# Patient Record
Sex: FEMALE | Race: WHITE | NOT HISPANIC OR LATINO | Employment: FULL TIME | URBAN - METROPOLITAN AREA
[De-identification: names, ages, dates, MRNs, and addresses within clinical notes are randomized per-mention and may not be internally consistent; named-entity substitution may affect disease eponyms.]

---

## 2018-01-13 ENCOUNTER — OFFICE VISIT (OUTPATIENT)
Dept: URGENT CARE | Facility: CLINIC | Age: 53
End: 2018-01-13
Payer: COMMERCIAL

## 2018-01-13 ENCOUNTER — GENERIC CONVERSION - ENCOUNTER (OUTPATIENT)
Dept: OTHER | Facility: OTHER | Age: 53
End: 2018-01-13

## 2018-01-13 DIAGNOSIS — I10 ESSENTIAL (PRIMARY) HYPERTENSION: ICD-10-CM

## 2018-01-13 LAB
ATRIAL RATE: 82 BPM
ATRIAL RATE: 82 BPM
P AXIS: 56 DEGREES
P AXIS: 59 DEGREES
PR INTERVAL: 146 MS
PR INTERVAL: 150 MS
QRS AXIS: 46 DEGREES
QRS AXIS: 50 DEGREES
QRSD INTERVAL: 88 MS
QRSD INTERVAL: 90 MS
QT INTERVAL: 396 MS
QT INTERVAL: 404 MS
QTC INTERVAL: 462 MS
QTC INTERVAL: 472 MS
T WAVE AXIS: 51 DEGREES
T WAVE AXIS: 53 DEGREES
VENTRICULAR RATE: 82 BPM
VENTRICULAR RATE: 82 BPM

## 2018-01-13 PROCEDURE — 99205 OFFICE O/P NEW HI 60 MIN: CPT

## 2018-01-13 PROCEDURE — 93005 ELECTROCARDIOGRAM TRACING: CPT

## 2018-01-14 NOTE — PROGRESS NOTES
Assessment   1  Hypertension, unspecified type (401 9) (I10)  2  Hypothyroidism, unspecified type (244 9) (E03 9)    Plan   Hypertension, unspecified type    · Start: Metoprolol Tartrate 25 MG Oral Tablet; TAKE 1 TABLET DAILY   · (1) COMPREHENSIVE METABOLIC PANEL; Status:Active; Requested [id number];    · (1) COMPREHENSIVE METABOLIC PANEL; Status:Active; Requested [id number]; 1    · (1) LIPID PANEL, FASTING; Status:Active; Requested [id number];    · (1) LIPID PANEL, FASTING; Status:Active; Requested [id number]; 1    · (1) TSH; Status:Active; Requested [id number];    · (1) TSH; Status:Active; Requested [id number]; 1    · EKG/ECG- POC; Status:Active - Perform Order; Requested [id number];      1 Amended By: Florian Velasco; 2018 9:09 AM EST      Discussion/Summary   Discussion Summary:    Recommend rest  Follow-up with your family physician at your earliest convenience  Return here or to an emergency department if you should experience worsening symptoms  Medication Side Effects Reviewed: Possible side effects of new medications were reviewed with the patient/guardian today  Understands and agrees with treatment plan: The treatment plan was reviewed with the patient/guardian  The patient/guardian understands and agrees with the treatment plan    Counseling Documentation With Imm: The patient was counseled regarding instructions for management,-- risk factor reductions,-- impressions  Chief Complaint   1  Dizziness  Chief Complaint Free Text Note Form: High blood pressure, Dizzi spells and palpatations on and off for 1 wk      History of Present Illness   HPI: The patient complains of elevated blood pressure and palpitations  She was in her normal state of good health until recently  She has had some anxiety at work which may be causing some of her problems  She does have a history of high blood pressure  She is taking lisinopril 20 mg daily      Hospital Based Practices Required Assessment:      Abuse And Domestic Violence Screen       Yes, the patient is safe at home  -- The patient states no one is hurting them  Depression And Suicide Screen  No, the patient has not had thoughts of hurting themself  No, the patient has not felt depressed in the past 7 days  Prefered Language is  [place]  Primary Language is  English  Review of Systems   Focused-Female:      Constitutional: No fever, no chills, feels well, no tiredness, no recent weight gain or loss  ENT: no earache,-- no nosebleeds,-- no sore throat,-- no nasal discharge-- and-- no hoarseness  Cardiovascular: palpitations, but-- the heart rate was not slow,-- no chest pain,-- no intermittent leg claudication,-- the heart rate was not fast-- and-- no lower extremity edema  Respiratory: no complaints of shortness of breath, no wheezing, no dyspnea on exertion, no orthopnea or PND  Gastrointestinal: no complaints of abdominal pain, no constipation, no nausea or diarrhea, no vomiting, no bloody stools  Genitourinary: no complaints of dysuria, no incontinence, no pelvic pain, no dysmenorrhea, no vaginal discharge or abnormal vaginal bleeding  Musculoskeletal: no complaints of arthralgia, no myalgia, no joint swelling or stiffness, no limb pain or swelling  Integumentary: no complaints of skin rash or lesion, no itching or dry skin, no skin wounds  Neurological: no complaints of headache, no confusion, no numbness or tingling, no dizziness or fainting  Past Medical History   Active Problems And Past Medical History Reviewed: The active problems and past medical history were reviewed and updated today  Family History   Family History Reviewed: The family history was reviewed and updated today  Social History   Social History Reviewed: The social history was reviewed and updated today  Surgical History   Surgical History Reviewed:     The surgical history was reviewed and updated today  Current Meds   Medication List Reviewed: The medication list was reviewed and updated today  Vitals   Signs   Recorded: [id number] 08:22AM   Temperature: 99 3 F  Heart Rate: 97  Systolic: 106  Diastolic: 86  Height: 5 ft 3 in  Weight: 194 lb 6 oz  BMI Calculated: 34 43  BSA Calculated: 1 91  O2 Saturation: 97  Pain Scale: 0    Physical Exam        Constitutional      General appearance: No acute distress, well appearing and well nourished  Eyes      Conjunctiva and lids: No swelling, erythema or discharge  Pupils and irises: Equal, round and reactive to light  Ears, Nose, Mouth, and Throat      External inspection of ears and nose: Normal        Otoscopic examination: Tympanic membranes translucent with normal light reflex  Canals patent without erythema  Nasal mucosa, septum, and turbinates: Normal without edema or erythema  Oropharynx: Normal with no erythema, edema, exudate or lesions  Pulmonary      Respiratory effort: No increased work of breathing or signs of respiratory distress  Auscultation of lungs: Clear to auscultation  Cardiovascular      Palpation of heart: Normal PMI, no thrills  Auscultation of heart: Normal rate and rhythm, normal S1 and S2, without murmurs  Examination of extremities for edema and/or varicosities: Normal        Abdomen      Abdomen: Non-tender, no masses  Liver and spleen: No hepatomegaly or splenomegaly  Lymphatic      Palpation of lymph nodes in neck: No lymphadenopathy  Musculoskeletal      Gait and station: Normal        Digits and nails: Normal without clubbing or cyanosis  Inspection/palpation of joints, bones, and muscles: Normal        Skin      Skin and subcutaneous tissue: Normal without rashes or lesions  Neurologic      Cranial nerves: Cranial nerves 2-12 intact  Reflexes: 2+ and symmetric  Sensation: No sensory loss  Psychiatric      Orientation to person, place, and time: Normal        Mood and affect: Normal        Results/Data   ECG: A 12 lead ECG was performed and was normal       Rhythm and rate: normal sinus rhythm        Signatures    Electronically signed by : Sarah Santoyo DO; Jan 13 2018  9:01AM EST                       (Author)     Electronically signed by : Sarah Santoyo DO; Jan 13 2018  9:05AM EST                       (Author)     Electronically signed by : Sarah Santoyo DO; Jan 13 2018  9:07AM EST                       (Author)     Electronically signed by : Sarah Santoyo DO; Jan 13 2018  9:10AM EST                       (Author)

## 2018-01-23 VITALS
OXYGEN SATURATION: 97 % | DIASTOLIC BLOOD PRESSURE: 86 MMHG | BODY MASS INDEX: 34.44 KG/M2 | WEIGHT: 194.38 LBS | TEMPERATURE: 99.3 F | HEIGHT: 63 IN | SYSTOLIC BLOOD PRESSURE: 160 MMHG | HEART RATE: 97 BPM

## 2019-07-21 ENCOUNTER — OFFICE VISIT (OUTPATIENT)
Dept: URGENT CARE | Facility: CLINIC | Age: 54
End: 2019-07-21
Payer: COMMERCIAL

## 2019-07-21 VITALS
RESPIRATION RATE: 18 BRPM | OXYGEN SATURATION: 96 % | TEMPERATURE: 98 F | WEIGHT: 205 LBS | HEIGHT: 63 IN | BODY MASS INDEX: 36.32 KG/M2 | HEART RATE: 82 BPM | DIASTOLIC BLOOD PRESSURE: 79 MMHG | SYSTOLIC BLOOD PRESSURE: 146 MMHG

## 2019-07-21 DIAGNOSIS — J06.9 URTI (ACUTE UPPER RESPIRATORY INFECTION): Primary | ICD-10-CM

## 2019-07-21 PROCEDURE — 99213 OFFICE O/P EST LOW 20 MIN: CPT | Performed by: FAMILY MEDICINE

## 2019-07-21 RX ORDER — PANTOPRAZOLE SODIUM 40 MG/1
GRANULE, DELAYED RELEASE ORAL
COMMUNITY
End: 2022-06-17

## 2019-07-21 RX ORDER — AZITHROMYCIN 250 MG/1
TABLET, FILM COATED ORAL
Qty: 6 TABLET | Refills: 0 | Status: SHIPPED | OUTPATIENT
Start: 2019-07-21 | End: 2019-07-26

## 2019-07-21 RX ORDER — NALTREXONE HYDROCHLORIDE AND BUPROPION HYDROCHLORIDE 8; 90 MG/1; MG/1
TABLET, EXTENDED RELEASE ORAL
COMMUNITY
Start: 2019-04-16 | End: 2022-06-17

## 2019-07-21 RX ORDER — ATORVASTATIN CALCIUM 20 MG/1
40 TABLET, FILM COATED ORAL
COMMUNITY
Start: 2019-02-12 | End: 2022-06-17

## 2019-07-21 RX ORDER — LEVOTHYROXINE SODIUM 0.12 MG/1
125 TABLET ORAL
COMMUNITY
Start: 2018-08-20 | End: 2019-08-20

## 2019-07-21 RX ORDER — LISINOPRIL 40 MG/1
40 TABLET ORAL
COMMUNITY
Start: 2019-05-17

## 2019-07-21 NOTE — PROGRESS NOTES
Assessment/Plan:  Recommend supportive care fluids and rest   Follow-up with family physician if not a lot better in 3 days  Diagnoses and all orders for this visit:    URTI (acute upper respiratory infection)  -     azithromycin (ZITHROMAX) 250 mg tablet; Take 2 tablets today then 1 tablet daily x 4 days  -     Dextromethorphan-guaiFENesin (DELSYM COUGH/CHEST CONGEST DM) 5-100 MG/5ML LIQD; Take 5 mL by mouth 2 (two) times a day for 1 day    Other orders  -     atorvastatin (LIPITOR) 20 mg tablet; Take 20 mg by mouth  -     levothyroxine 125 mcg tablet; Take 125 mcg by mouth  -     Discontinue: Dulaglutide (TRULICITY) 9 39 QF/1 8UV SOPN; INJECT AS DIRECTED 0 5 ML ONCE A WEEK SUBCUTANEOUS 30 DAYS  -     pantoprazole (PROTONIX) 40 mg; Take by mouth  -     CONTRAVE 8-90 MG TB12  -     lisinopril (ZESTRIL) 40 mg tablet; Take 40 mg by mouth          Subjective:      Patient ID: Kumar Hale is a 48 y o  female  Patient presents with:  Cough: 3 weeks with symptoms last 3 days have got worse     She has been taking Coricidin, using Flonase and Benadryl with some relief  The following portions of the patient's history were reviewed and updated as appropriate: allergies, current medications, past family history, past medical history, past social history, past surgical history and problem list     Review of Systems   Constitutional: Negative  HENT: Positive for congestion, postnasal drip, rhinorrhea, sinus pressure and sinus pain  Negative for sore throat  Eyes: Negative  Respiratory: Positive for cough  Cardiovascular: Negative  Gastrointestinal: Negative  Endocrine: Negative  Genitourinary: Negative  Musculoskeletal: Negative  Skin: Negative  Allergic/Immunologic: Negative  Neurological: Negative  Hematological: Negative  Psychiatric/Behavioral: Negative  All other systems reviewed and are negative          Objective:      /79   Pulse 82   Temp 98 °F (36 7 °C) Resp 18   Ht 5' 3" (1 6 m)   Wt 93 kg (205 lb)   SpO2 96%   BMI 36 31 kg/m²          Physical Exam   Constitutional: She is oriented to person, place, and time  She appears well-developed and well-nourished  HENT:   Head: Normocephalic and atraumatic  Right Ear: External ear normal    Left Ear: External ear normal    Nose: Nose normal    Mouth/Throat: Oropharyngeal exudate present  Eyes: Pupils are equal, round, and reactive to light  Conjunctivae and EOM are normal    Neck: Normal range of motion  Neck supple  Cardiovascular: Normal rate, regular rhythm and normal heart sounds  Pulmonary/Chest: Effort normal and breath sounds normal    Abdominal: Soft  Bowel sounds are normal    Musculoskeletal: Normal range of motion  Neurological: She is alert and oriented to person, place, and time  She has normal reflexes  Skin: Skin is warm and dry  Psychiatric: She has a normal mood and affect  Her behavior is normal    Nursing note and vitals reviewed

## 2020-06-24 LAB — HCV AB SER-ACNC: NORMAL

## 2020-08-12 ENCOUNTER — APPOINTMENT (OUTPATIENT)
Dept: RADIOLOGY | Facility: CLINIC | Age: 55
End: 2020-08-12
Payer: COMMERCIAL

## 2020-08-12 ENCOUNTER — OFFICE VISIT (OUTPATIENT)
Dept: URGENT CARE | Facility: CLINIC | Age: 55
End: 2020-08-12
Payer: COMMERCIAL

## 2020-08-12 VITALS
RESPIRATION RATE: 16 BRPM | SYSTOLIC BLOOD PRESSURE: 186 MMHG | HEART RATE: 95 BPM | HEIGHT: 63 IN | WEIGHT: 213 LBS | TEMPERATURE: 99.8 F | BODY MASS INDEX: 37.74 KG/M2 | DIASTOLIC BLOOD PRESSURE: 84 MMHG | OXYGEN SATURATION: 96 %

## 2020-08-12 DIAGNOSIS — M25.572 ACUTE LEFT ANKLE PAIN: ICD-10-CM

## 2020-08-12 DIAGNOSIS — S86.012A RUPTURE OF LEFT ACHILLES TENDON, INITIAL ENCOUNTER: Primary | ICD-10-CM

## 2020-08-12 PROCEDURE — 73650 X-RAY EXAM OF HEEL: CPT

## 2020-08-12 PROCEDURE — 99213 OFFICE O/P EST LOW 20 MIN: CPT | Performed by: EMERGENCY MEDICINE

## 2020-08-12 RX ORDER — LEVOTHYROXINE SODIUM 175 UG/1
135 TABLET ORAL DAILY
COMMUNITY
End: 2022-06-17

## 2020-08-12 RX ORDER — SERTRALINE HYDROCHLORIDE 100 MG/1
100 TABLET, FILM COATED ORAL DAILY
COMMUNITY

## 2020-08-12 NOTE — PROGRESS NOTES
[facility]        NAME: Viktoriya Carter is a 47 y o  female  : 1965    MRN: [id number]  DATE: 2020  TIME: 9:33 AM    Assessment and Plan   Rupture of left Achilles tendon, initial encounter [S86 012A]  1  Rupture of left Achilles tendon, initial encounter  XR heel / calcaneus 2+ vw left    Ambulatory referral to Orthopedic Surgery    Crutches    M-Brace Air-Gel Ankle Brace    CANCELED: XR ankle 3+ vw left         Patient Instructions     Patient Instructions     Achilles Tendon Rupture   WHAT YOU NEED TO KNOW:   An Achilles tendon rupture happens when your Achilles tendon tears, or separates from your heel bone  The Achilles tendon connects your calf muscle to your heel bone  It allows you to point your foot down and to rise on your toes  An Achilles tendon rupture may be caused by a sports injury or a fall  DISCHARGE INSTRUCTIONS:   Return to the emergency department if:   · Your leg feels warm, tender, and painful  It may look swollen and red  · Your foot or toes are numb  Contact your healthcare provider if:   · You have a fever  · Your symptoms do not get better with treatment  · You have questions or concerns about your condition or care  Medicines: You may  need any of the following:  · NSAIDs , such as ibuprofen, help decrease swelling, pain, and fever  This medicine is available with or without a doctor's order  NSAIDs can cause stomach bleeding or kidney problems in certain people  If you take blood thinner medicine, always ask your healthcare provider if NSAIDs are safe for you  Always read the medicine label and follow directions  · Prescription pain medicine  may be given  Ask your healthcare provider how to take this medicine safely  Some prescription pain medicines contain acetaminophen  Do not take other medicines that contain acetaminophen without talking to your healthcare provider  Too much acetaminophen may cause liver damage   Prescription pain medicine may cause constipation  Ask your healthcare provider how to prevent or treat constipation  · Take your medicine as directed  Contact your healthcare provider if you think your medicine is not helping or if you have side effects  Tell him or her if you are allergic to any medicine  Keep a list of the medicines, vitamins, and herbs you take  Include the amounts, and when and why you take them  Bring the list or the pill bottles to follow-up visits  Carry your medicine list with you in case of an emergency  Use a support device as directed: You may need crutches or a cane to decrease stress and pressure on your tendon  Your healthcare provider will tell you how much weight you can put on your leg  Ask for more information about how to use crutches or a cane correctly  Wear your brace or splint as directed  This devices will keep your tendon straight and help it heal    Rest  as directed  Your healthcare provider will tell you when it is okay to walk and play sports  You may not be able to play sports for 6 months or longer  Ask when you can go back to work or school  Do not drive until your healthcare provider says it is okay  Apply ice  on your Achilles tendon for 15 to 20 minutes every hour or as directed  Use an ice pack, or put crushed ice in a plastic bag  Cover it with a towel  Ice helps prevent tissue damage and decreases swelling and pain  Elevate  your heel above the level of your heart as often as you can  This will help decrease swelling and pain  Prop your heel on pillows or blankets to keep it elevated comfortably  Go to physical therapy as directed:  A physical therapist teaches you exercises to help improve movement and strength, and to decrease pain  You may not start physical therapy for a few weeks or until your cast is removed  Follow up with your healthcare provider as directed: You will need to return to have your cast adjusted   Write down your questions so you remember to ask them during your visits  © 2017 [employer] Information is for End User's use only and may not be sold, redistributed or otherwise used for commercial purposes  All illustrations and images included in CareNotes® are the copyrighted property of [employer]  or [employer]  The above information is an  only  It is not intended as medical advice for individual conditions or treatments  Talk to your doctor, nurse or pharmacist before following any medical regimen to see if it is safe and effective for you  Ankle Sprain   WHAT YOU NEED TO KNOW:   An ankle sprain happens when 1 or more ligaments in your ankle joint stretch or tear  Ligaments are tough tissues that connect bones  Ligaments support your joints and keep your bones in place  DISCHARGE INSTRUCTIONS:   Return to the emergency department if:   · You have severe pain in your ankle  · Your foot or toes are cold or numb  · Your ankle becomes more weak or unstable (wobbly)  · You are unable to put any weight on your ankle or foot  · Your swelling has increased or returned  Contact your healthcare provider if:   · Your pain does not go away, even after treatment  · You have questions or concerns about your condition or care  Medicines: You may need any of the following:  · NSAIDs , such as ibuprofen, help decrease swelling, pain, and fever  This medicine is available with or without a doctor's order  NSAIDs can cause stomach bleeding or kidney problems in certain people  If you take blood thinner medicine, always ask your healthcare provider if NSAIDs are safe for you  Always read the medicine label and follow directions  · Acetaminophen  decreases pain  It is available without a doctor's order  Ask how much to take and how often to take it  Follow directions  Acetaminophen can cause liver damage if not taken correctly  · Prescription pain medicine  may be given   Ask how to take this medicine safely  · Take your medicine as directed  Contact your healthcare provider if you think your medicine is not helping or if you have side effects  Tell him or her if you are allergic to any medicine  Keep a list of the medicines, vitamins, and herbs you take  Include the amounts, and when and why you take them  Bring the list or the pill bottles to follow-up visits  Carry your medicine list with you in case of an emergency  Self care:   · Use support devices,  such as a brace, cast, or splint, may be needed to limit your movement and protect your joint  You may need to use crutches to decrease your pain as you move around  · Go to physical therapy as directed  A physical therapist teaches you exercises to help improve movement and strength, and to decrease pain  · Rest  your ankle so that it can heal  Return to normal activities as directed  · Apply ice on your ankle for 15 to 20 minutes every hour or as directed  Use an ice pack, or put crushed ice in a plastic bag  Cover it with a towel  Ice helps prevent tissue damage and decreases swelling and pain  · Compress  your ankle  Ask if you should wrap an elastic bandage around your injured ligament  An elastic bandage provides support and helps decrease swelling and movement so your joint can heal  Wear as long as directed  · Elevate  your ankle above the level of your heart as often as you can  This will help decrease swelling and pain  Prop your ankle on pillows or blankets to keep it elevated comfortably  Prevent another ankle sprain:   · Let your ankle heal   Find out how long your ligament needs to heal  Do not do any physical activity until your healthcare provider says it is okay  If you start activity too soon, you may develop a more serious injury  · Always warm up and stretch  before you exercise or play sports  · Use the right equipment  Always wear shoes that fit well and are made for the activity that you are doing   You may also need ankle supports, elbow and knee pads, or braces  Follow up with your healthcare provider as directed:  Write down your questions so you remember to ask them during your visits  © 2017 [employer] Information is for End User's use only and may not be sold, redistributed or otherwise used for commercial purposes  All illustrations and images included in CareNotes® are the copyrighted property of [employer]  or [employer]  The above information is an  only  It is not intended as medical advice for individual conditions or treatments  Talk to your doctor, nurse or pharmacist before following any medical regimen to see if it is safe and effective for you  Follow up with PCP in 3-5 days  Proceed to  ER if symptoms worsen  Chief Complaint     Chief Complaint   Patient presents with    Ankle Injury     yesterday felt a pop in left heel area while running on tip toes, pain to left heel area, tx with ice but felt worse after that  History of Present Illness       Patient complains of sudden onset of pain associated with audible pop at Achilles tendon while running on her tip toes yesterday  She denies prior Achilles or ankle problems  Review of Systems   Review of Systems   Constitutional: Negative for activity change  Gastrointestinal: Negative for abdominal pain  Musculoskeletal: Positive for arthralgias, gait problem and joint swelling  Negative for back pain, myalgias, neck pain and neck stiffness  Skin: Negative for color change and wound  Neurological: Negative for dizziness, syncope, weakness, light-headedness and headaches           Current Medications       Current Outpatient Medications:     atorvastatin (LIPITOR) 20 mg tablet, Take 20 mg by mouth, Disp: , Rfl:     CONTRAVE 8-90 MG TB12, , Disp: , Rfl:     levothyroxine 175 mcg tablet, Take 175 mcg by mouth daily, Disp: , Rfl:     lisinopril (ZESTRIL) 40 mg tablet, Take 40 mg by mouth, Disp: , Rfl:     pantoprazole (PROTONIX) 40 mg, Take by mouth, Disp: , Rfl:     sertraline (ZOLOFT) 100 mg tablet, Take 100 mg by mouth daily, Disp: , Rfl:     levothyroxine 125 mcg tablet, Take 125 mcg by mouth, Disp: , Rfl:     Current Allergies     Allergies as of 08/12/2020 - Reviewed 08/12/2020   Allergen Reaction Noted    Penicillins Anaphylaxis 12/13/2012    Clarithromycin Hives and Other (See Comments) 01/10/2013    Sulfamethoxazole-trimethoprim Diarrhea 12/13/2012    Tetracyclines & related Swelling 12/13/2012            The following portions of the patient's history were reviewed and updated as appropriate: allergies, current medications, past family history, past medical history, past social history, past surgical history and problem list      Past Medical History:   Diagnosis Date    Anxiety     High cholesterol     Hypertension     Hypothyroid        Past Surgical History:   Procedure Laterality Date    BACK SURGERY      CARPAL TUNNEL RELEASE Right     ENDOMETRIAL ABLATION      HYSTERECTOMY      HYSTERECTOMY      LAMINECTOMY         History reviewed  No pertinent family history  Medications have been verified  Objective   BP (!) 186/84   Pulse 95   Temp 99 8 °F (37 7 °C) (Tympanic)   Resp 16   Ht 5' 3" (1 6 m)   Wt 96 6 kg (213 lb)   SpO2 96%   BMI 37 73 kg/m²        Physical Exam     Physical Exam  Vitals signs and nursing note reviewed  Constitutional:       Appearance: She is well-developed  HENT:      Head: Normocephalic and atraumatic  Eyes:      Conjunctiva/sclera: Conjunctivae normal       Pupils: Pupils are equal, round, and reactive to light  Neck:      Musculoskeletal: Normal range of motion and neck supple  Musculoskeletal:         General: Tenderness present  Comments: Tender at Achilles insertion on calcaneus lateral aspect  Questionable Nolen test   Skin:     General: Skin is warm and dry  Findings: No rash  Neurological:      Mental Status: She is alert and oriented to person, place, and time  Deep Tendon Reflexes: Reflexes normal    Psychiatric:         Behavior: Behavior normal          Thought Content:  Thought content normal          Judgment: Judgment normal

## 2020-08-12 NOTE — PATIENT INSTRUCTIONS
Achilles Tendon Rupture   WHAT YOU NEED TO KNOW:   An Achilles tendon rupture happens when your Achilles tendon tears, or separates from your heel bone  The Achilles tendon connects your calf muscle to your heel bone  It allows you to point your foot down and to rise on your toes  An Achilles tendon rupture may be caused by a sports injury or a fall  DISCHARGE INSTRUCTIONS:   Return to the emergency department if:   · Your leg feels warm, tender, and painful  It may look swollen and red  · Your foot or toes are numb  Contact your healthcare provider if:   · You have a fever  · Your symptoms do not get better with treatment  · You have questions or concerns about your condition or care  Medicines: You may  need any of the following:  · NSAIDs , such as ibuprofen, help decrease swelling, pain, and fever  This medicine is available with or without a doctor's order  NSAIDs can cause stomach bleeding or kidney problems in certain people  If you take blood thinner medicine, always ask your healthcare provider if NSAIDs are safe for you  Always read the medicine label and follow directions  · Prescription pain medicine  may be given  Ask your healthcare provider how to take this medicine safely  Some prescription pain medicines contain acetaminophen  Do not take other medicines that contain acetaminophen without talking to your healthcare provider  Too much acetaminophen may cause liver damage  Prescription pain medicine may cause constipation  Ask your healthcare provider how to prevent or treat constipation  · Take your medicine as directed  Contact your healthcare provider if you think your medicine is not helping or if you have side effects  Tell him or her if you are allergic to any medicine  Keep a list of the medicines, vitamins, and herbs you take  Include the amounts, and when and why you take them  Bring the list or the pill bottles to follow-up visits   Carry your medicine list with you in case of an emergency  Use a support device as directed: You may need crutches or a cane to decrease stress and pressure on your tendon  Your healthcare provider will tell you how much weight you can put on your leg  Ask for more information about how to use crutches or a cane correctly  Wear your brace or splint as directed  This devices will keep your tendon straight and help it heal    Rest  as directed  Your healthcare provider will tell you when it is okay to walk and play sports  You may not be able to play sports for 6 months or longer  Ask when you can go back to work or school  Do not drive until your healthcare provider says it is okay  Apply ice  on your Achilles tendon for 15 to 20 minutes every hour or as directed  Use an ice pack, or put crushed ice in a plastic bag  Cover it with a towel  Ice helps prevent tissue damage and decreases swelling and pain  Elevate  your heel above the level of your heart as often as you can  This will help decrease swelling and pain  Prop your heel on pillows or blankets to keep it elevated comfortably  Go to physical therapy as directed:  A physical therapist teaches you exercises to help improve movement and strength, and to decrease pain  You may not start physical therapy for a few weeks or until your cast is removed  Follow up with your healthcare provider as directed: You will need to return to have your cast adjusted  Write down your questions so you remember to ask them during your visits  © 2017 [address] [employer] Information is for End User's use only and may not be sold, redistributed or otherwise used for commercial purposes  All illustrations and images included in CareNotes® are the copyrighted property of A D A M , Inc  or [employer]  The above information is an  only  It is not intended as medical advice for individual conditions or treatments   Talk to your doctor, nurse or pharmacist before following any medical regimen to see if it is safe and effective for you  Ankle Sprain   WHAT YOU NEED TO KNOW:   An ankle sprain happens when 1 or more ligaments in your ankle joint stretch or tear  Ligaments are tough tissues that connect bones  Ligaments support your joints and keep your bones in place  DISCHARGE INSTRUCTIONS:   Return to the emergency department if:   · You have severe pain in your ankle  · Your foot or toes are cold or numb  · Your ankle becomes more weak or unstable (wobbly)  · You are unable to put any weight on your ankle or foot  · Your swelling has increased or returned  Contact your healthcare provider if:   · Your pain does not go away, even after treatment  · You have questions or concerns about your condition or care  Medicines: You may need any of the following:  · NSAIDs , such as ibuprofen, help decrease swelling, pain, and fever  This medicine is available with or without a doctor's order  NSAIDs can cause stomach bleeding or kidney problems in certain people  If you take blood thinner medicine, always ask your healthcare provider if NSAIDs are safe for you  Always read the medicine label and follow directions  · Acetaminophen  decreases pain  It is available without a doctor's order  Ask how much to take and how often to take it  Follow directions  Acetaminophen can cause liver damage if not taken correctly  · Prescription pain medicine  may be given  Ask how to take this medicine safely  · Take your medicine as directed  Contact your healthcare provider if you think your medicine is not helping or if you have side effects  Tell him or her if you are allergic to any medicine  Keep a list of the medicines, vitamins, and herbs you take  Include the amounts, and when and why you take them  Bring the list or the pill bottles to follow-up visits  Carry your medicine list with you in case of an emergency    Self care:   · Use support devices,  such as a brace, cast, or splint, may be needed to limit your movement and protect your joint  You may need to use crutches to decrease your pain as you move around  · Go to physical therapy as directed  A physical therapist teaches you exercises to help improve movement and strength, and to decrease pain  · Rest  your ankle so that it can heal  Return to normal activities as directed  · Apply ice on your ankle for 15 to 20 minutes every hour or as directed  Use an ice pack, or put crushed ice in a plastic bag  Cover it with a towel  Ice helps prevent tissue damage and decreases swelling and pain  · Compress  your ankle  Ask if you should wrap an elastic bandage around your injured ligament  An elastic bandage provides support and helps decrease swelling and movement so your joint can heal  Wear as long as directed  · Elevate  your ankle above the level of your heart as often as you can  This will help decrease swelling and pain  Prop your ankle on pillows or blankets to keep it elevated comfortably  Prevent another ankle sprain:   · Let your ankle heal   Find out how long your ligament needs to heal  Do not do any physical activity until your healthcare provider says it is okay  If you start activity too soon, you may develop a more serious injury  · Always warm up and stretch  before you exercise or play sports  · Use the right equipment  Always wear shoes that fit well and are made for the activity that you are doing  You may also need ankle supports, elbow and knee pads, or braces  Follow up with your healthcare provider as directed:  Write down your questions so you remember to ask them during your visits  © 2017 [facility] Information is for End User's use only and may not be sold, redistributed or otherwise used for commercial purposes  All illustrations and images included in CareNotes® are the copyrighted property of A D A M , Inc  or [employer]ft[employer] Mary    The above information is an  only  It is not intended as medical advice for individual conditions or treatments  Talk to your doctor, nurse or pharmacist before following any medical regimen to see if it is safe and effective for you

## 2020-12-21 ENCOUNTER — OFFICE VISIT (OUTPATIENT)
Dept: URGENT CARE | Facility: CLINIC | Age: 55
End: 2020-12-21
Payer: COMMERCIAL

## 2020-12-21 VITALS
BODY MASS INDEX: 37.74 KG/M2 | RESPIRATION RATE: 16 BRPM | HEART RATE: 93 BPM | WEIGHT: 213 LBS | TEMPERATURE: 98.2 F | HEIGHT: 63 IN | OXYGEN SATURATION: 96 %

## 2020-12-21 DIAGNOSIS — Z20.822 ENCOUNTER FOR LABORATORY TESTING FOR COVID-19 VIRUS: ICD-10-CM

## 2020-12-21 DIAGNOSIS — R68.89 FLU-LIKE SYMPTOMS: Primary | ICD-10-CM

## 2020-12-21 PROCEDURE — G0382 LEV 3 HOSP TYPE B ED VISIT: HCPCS | Performed by: EMERGENCY MEDICINE

## 2020-12-21 PROCEDURE — S9083 URGENT CARE CENTER GLOBAL: HCPCS | Performed by: EMERGENCY MEDICINE

## 2020-12-21 PROCEDURE — U0003 INFECTIOUS AGENT DETECTION BY NUCLEIC ACID (DNA OR RNA); SEVERE ACUTE RESPIRATORY SYNDROME CORONAVIRUS 2 (SARS-COV-2) (CORONAVIRUS DISEASE [COVID-19]), AMPLIFIED PROBE TECHNIQUE, MAKING USE OF HIGH THROUGHPUT TECHNOLOGIES AS DESCRIBED BY CMS-2020-01-R: HCPCS | Performed by: EMERGENCY MEDICINE

## 2020-12-22 LAB — SARS-COV-2 RNA SPEC QL NAA+PROBE: NOT DETECTED

## 2021-03-24 ENCOUNTER — OFFICE VISIT (OUTPATIENT)
Dept: URGENT CARE | Facility: CLINIC | Age: 56
End: 2021-03-24
Payer: COMMERCIAL

## 2021-03-24 VITALS
HEIGHT: 63 IN | OXYGEN SATURATION: 97 % | WEIGHT: 215 LBS | TEMPERATURE: 97.5 F | HEART RATE: 89 BPM | DIASTOLIC BLOOD PRESSURE: 92 MMHG | RESPIRATION RATE: 18 BRPM | SYSTOLIC BLOOD PRESSURE: 148 MMHG | BODY MASS INDEX: 38.09 KG/M2

## 2021-03-24 DIAGNOSIS — S39.012A STRAIN OF LUMBAR REGION, INITIAL ENCOUNTER: Primary | ICD-10-CM

## 2021-03-24 PROCEDURE — 99213 OFFICE O/P EST LOW 20 MIN: CPT | Performed by: FAMILY MEDICINE

## 2021-03-24 RX ORDER — BACLOFEN 10 MG/1
10 TABLET ORAL 3 TIMES DAILY
Qty: 30 TABLET | Refills: 0 | Status: SHIPPED | OUTPATIENT
Start: 2021-03-24 | End: 2022-06-17

## 2021-03-24 NOTE — PROGRESS NOTES
[facility]        NAME: Mauro Muñiz is a 54 y o  female  : 1965    MRN: [id number]  DATE: 2021  TIME: 8:22 AM    Assessment and Plan   Strain of lumbar region, initial encounter [S39 012A]  1  Strain of lumbar region, initial encounter  baclofen 10 mg tablet         Patient Instructions     Continue heat for 20 30 minutes, 3 to 4 times a day  Continue ibuprofen, 600 mg, every 6 hours  Follow up with PCP in 3-5 days  Proceed to  ER if symptoms worsen  Chief Complaint     Chief Complaint   Patient presents with    Back Pain     Right Lower back pain s/p walking 3/19         History of Present Illness       Back Pain (Right Lower back pain s/p walking 3/19)  Denies any trauma  Patient has been using ice with no relief, heat with relief, and ibuprofen with relief, but the pain has continued  Back Pain  This is a new problem  The current episode started in the past 7 days  The problem occurs constantly  The problem is unchanged  The pain is present in the sacro-iliac  The pain is moderate  Review of Systems   Review of Systems   Constitutional: Negative  Respiratory: Negative  Cardiovascular: Negative  Musculoskeletal: Positive for back pain           Current Medications       Current Outpatient Medications:     atorvastatin (LIPITOR) 20 mg tablet, Take 40 mg by mouth , Disp: , Rfl:     levothyroxine 175 mcg tablet, Take 135 mcg by mouth daily , Disp: , Rfl:     lisinopril (ZESTRIL) 40 mg tablet, Take 40 mg by mouth, Disp: , Rfl:     pantoprazole (PROTONIX) 40 mg, Take by mouth, Disp: , Rfl:     sertraline (ZOLOFT) 100 mg tablet, Take 100 mg by mouth daily, Disp: , Rfl:     baclofen 10 mg tablet, Take 1 tablet (10 mg total) by mouth 3 (three) times a day for 10 days, Disp: 30 tablet, Rfl: 0    CONTRAVE 8-90 MG TB12, , Disp: , Rfl:     Current Allergies     Allergies as of 2021 - Reviewed 2021   Allergen Reaction Noted    Penicillins Anaphylaxis 12/13/2012    Clarithromycin Hives and Other (See Comments) 01/10/2013    Sulfamethoxazole-trimethoprim Diarrhea 12/13/2012    Tetracyclines & related Swelling 12/13/2012            The following portions of the patient's history were reviewed and updated as appropriate: allergies, current medications, past family history, past medical history, past social history, past surgical history and problem list      Past Medical History:   Diagnosis Date    Anxiety     High cholesterol     Hypertension     Hypothyroid        Past Surgical History:   Procedure Laterality Date    BACK SURGERY      CARPAL TUNNEL RELEASE Right     ENDOMETRIAL ABLATION      HYSTERECTOMY      HYSTERECTOMY      LAMINECTOMY         History reviewed  No pertinent family history  Medications have been verified  Objective   /99   Pulse 89   Temp 97 5 °F (36 4 °C)   Resp 18   Ht 5' 3" (1 6 m)   Wt 97 5 kg (215 lb)   SpO2 97%   BMI 38 09 kg/m²   No LMP recorded  Patient has had a hysterectomy  Physical Exam     Physical Exam  Constitutional:       Appearance: Normal appearance  She is well-developed  Cardiovascular:      Rate and Rhythm: Normal rate and regular rhythm  Pulmonary:      Effort: Pulmonary effort is normal       Breath sounds: Normal breath sounds  Musculoskeletal:      Lumbar back: She exhibits tenderness, bony tenderness and spasm  She exhibits normal range of motion  Back:    Neurological:      Mental Status: She is alert

## 2021-03-24 NOTE — PATIENT INSTRUCTIONS
Continue heat for 20 30 minutes, 3 to 4 times a day  Continue ibuprofen, 600 mg, every 6 hours  Follow up with PCP in 3-5 days  Proceed to  ER if symptoms worsen  Acute Low Back Pain   AMBULATORY CARE:   Acute low back pain  is sudden discomfort in your lower back area that lasts for up to 6 weeks  The discomfort makes it difficult to tolerate activity  Common symptoms include the following:   · Back stiffness or spasms    · Pain down the back or side of one leg    · Holding yourself in an unusual position or posture to decrease your back pain    · Not being able to find a sitting position that is comfortable    · Slow increase in your pain for 24 to 48 hours after you stress your back    · Tenderness on your lower back or severe pain when you move your back    Seek care immediately if:   · You have severe pain  · You have sudden stiffness and heaviness on both buttocks down to both legs  · You have numbness or weakness in one leg, or pain in both legs  · You have numbness in your genital area or across your lower back  · You cannot control your urine or bowel movements  Contact your healthcare provider if:   · You have a fever  · You have pain at night or when you rest     · Your pain does not get better with treatment  · You have pain that worsens when you cough or sneeze  · You suddenly feel something pop or snap in your back  · You have questions or concerns about your condition or care  The goal of treatment for acute low back pain  is to relieve your pain and help you tolerate activity  Most people with acute lower back pain get better within 4 to 6 weeks  You may need any of the following:  · NSAIDs  help decrease swelling and pain  This medicine is available with or without a doctor's order  NSAIDs can cause stomach bleeding or kidney problems in certain people  If you take blood thinner medicine, always ask your healthcare provider if NSAIDs are safe for you   Always read the medicine label and follow directions  · Acetaminophen  decreases pain and fever  It is available without a doctor's order  Ask how much to take and how often to take it  Follow directions  Read the labels of all other medicines you are using to see if they also contain acetaminophen, or ask your doctor or pharmacist  Acetaminophen can cause liver damage if not taken correctly  Do not use more than 4 grams (4,000 milligrams) total of acetaminophen in one day  · Muscle relaxers  decrease pain by relaxing the muscles in your lower spine  · Prescription pain medicine  may be given  Ask your healthcare provider how to take this medicine safely  Some prescription pain medicines contain acetaminophen  Do not take other medicines that contain acetaminophen without talking to your healthcare provider  Too much acetaminophen may cause liver damage  Prescription pain medicine may cause constipation  Ask your healthcare provider how to prevent or treat constipation  Manage your symptoms:   · Stay active  as much as you can without causing more pain  Bed rest could make your back pain worse  Start with some light exercises such as walking  Avoid heavy lifting until your pain is gone  Ask for more information about the activities or exercises that are right for you  · Apply ice  on your back for 15 to 20 minutes every hour or as directed  Use an ice pack, or put crushed ice in a plastic bag  Cover it with a towel before you apply it to your skin  Ice helps prevent tissue damage and decreases swelling and pain  · Apply heat  on your back for 20 to 30 minutes every 2 hours for as many days as directed  Heat helps decrease pain and muscle spasms  Alternate heat and ice  Prevent acute low back pain:   · Use proper body mechanics  ? Bend at the hips and knees when you  objects  Do not bend from the waist  Use your leg muscles as you lift the load  Do not use your back   Keep the object close to your chest as you lift it  Try not to twist or lift anything above your waist     ? Change your position often when you stand for long periods of time  Rest one foot on a small box or footrest, and then switch to the other foot often  ? Try not to sit for long periods of time  When you do, sit in a straight-backed chair with your feet flat on the floor  Never reach, pull, or push while you are sitting  · Do exercises that strengthen your back muscles  Warm up before you exercise  Ask your healthcare provider the best exercises for you  · Maintain a healthy weight  Ask your healthcare provider how much you should weigh  Ask him to help you create a weight loss plan if you are overweight  Follow up with your healthcare provider as directed:  Return for a follow-up visit if you still have pain after 1 to 3 weeks of treatment  You may need to visit an orthopedist if your back pain lasts longer than 12 weeks  Write down your questions so you remember to ask them during your visits  © Copyright [address] Information is for End User's use only and may not be sold, redistributed or otherwise used for commercial purposes  All illustrations and images included in CareNotes® are the copyrighted property of [employer]  or [facility] [place] [employer]   The above information is an  only  It is not intended as medical advice for individual conditions or treatments  Talk to your doctor, nurse or pharmacist before following any medical regimen to see if it is safe and effective for you

## 2021-10-03 ENCOUNTER — OFFICE VISIT (OUTPATIENT)
Dept: URGENT CARE | Facility: CLINIC | Age: 56
End: 2021-10-03
Payer: COMMERCIAL

## 2021-10-03 VITALS
TEMPERATURE: 97.9 F | SYSTOLIC BLOOD PRESSURE: 142 MMHG | HEART RATE: 97 BPM | RESPIRATION RATE: 18 BRPM | DIASTOLIC BLOOD PRESSURE: 87 MMHG | BODY MASS INDEX: 37.21 KG/M2 | WEIGHT: 210 LBS | OXYGEN SATURATION: 98 % | HEIGHT: 63 IN

## 2021-10-03 DIAGNOSIS — S61.012A LACERATION WITHOUT FOREIGN BODY OF LEFT THUMB WITHOUT DAMAGE TO NAIL, INITIAL ENCOUNTER: Primary | ICD-10-CM

## 2021-10-03 PROCEDURE — 90471 IMMUNIZATION ADMIN: CPT | Performed by: EMERGENCY MEDICINE

## 2021-10-03 PROCEDURE — G0382 LEV 3 HOSP TYPE B ED VISIT: HCPCS | Performed by: EMERGENCY MEDICINE

## 2021-10-03 PROCEDURE — 90715 TDAP VACCINE 7 YRS/> IM: CPT

## 2021-10-03 PROCEDURE — 12001 RPR S/N/AX/GEN/TRNK 2.5CM/<: CPT | Performed by: EMERGENCY MEDICINE

## 2021-10-03 PROCEDURE — S9083 URGENT CARE CENTER GLOBAL: HCPCS | Performed by: EMERGENCY MEDICINE

## 2021-10-03 RX ORDER — DOXYCYCLINE 100 MG/1
100 TABLET ORAL 2 TIMES DAILY
Qty: 10 TABLET | Refills: 0 | Status: SHIPPED | OUTPATIENT
Start: 2021-10-03 | End: 2021-10-08

## 2021-10-24 ENCOUNTER — OFFICE VISIT (OUTPATIENT)
Dept: URGENT CARE | Facility: CLINIC | Age: 56
End: 2021-10-24
Payer: COMMERCIAL

## 2021-10-24 VITALS
OXYGEN SATURATION: 94 % | WEIGHT: 216 LBS | TEMPERATURE: 98.9 F | BODY MASS INDEX: 38.27 KG/M2 | RESPIRATION RATE: 18 BRPM | HEART RATE: 101 BPM | HEIGHT: 63 IN

## 2021-10-24 DIAGNOSIS — Z20.822 EXPOSURE TO COVID-19 VIRUS: ICD-10-CM

## 2021-10-24 DIAGNOSIS — J06.9 VIRAL URI: Primary | ICD-10-CM

## 2021-10-24 PROCEDURE — U0005 INFEC AGEN DETEC AMPLI PROBE: HCPCS | Performed by: PHYSICIAN ASSISTANT

## 2021-10-24 PROCEDURE — U0003 INFECTIOUS AGENT DETECTION BY NUCLEIC ACID (DNA OR RNA); SEVERE ACUTE RESPIRATORY SYNDROME CORONAVIRUS 2 (SARS-COV-2) (CORONAVIRUS DISEASE [COVID-19]), AMPLIFIED PROBE TECHNIQUE, MAKING USE OF HIGH THROUGHPUT TECHNOLOGIES AS DESCRIBED BY CMS-2020-01-R: HCPCS | Performed by: PHYSICIAN ASSISTANT

## 2021-10-24 PROCEDURE — 99213 OFFICE O/P EST LOW 20 MIN: CPT | Performed by: PHYSICIAN ASSISTANT

## 2021-10-25 LAB — SARS-COV-2 RNA RESP QL NAA+PROBE: NEGATIVE

## 2022-06-04 LAB — EXTERNAL EGFR: 88

## 2022-06-10 ENCOUNTER — RA CDI HCC (OUTPATIENT)
Dept: OTHER | Facility: HOSPITAL | Age: 57
End: 2022-06-10

## 2022-06-10 NOTE — PROGRESS NOTES
[facility]  coding opportunities       Chart reviewed, no opportunity found: CHART REVIEWED, NO OPPORTUNITY FOUND        Patients Insurance        Commercial Insurance: [address] [place] [address]

## 2022-06-15 ENCOUNTER — TELEPHONE (OUTPATIENT)
Dept: ADMINISTRATIVE | Facility: OTHER | Age: 57
End: 2022-06-15

## 2022-06-15 NOTE — TELEPHONE ENCOUNTER
Upon review of the In Basket request we were able to locate, review, and update the patient chart as requested for Mammogram     Any additional questions or concerns should be emailed to the Practice Liaisons via [email or web address] email, please do not reply via In Basket      Thank you  Ele Turner

## 2022-06-15 NOTE — TELEPHONE ENCOUNTER
----- Message from Juan Jose Swartz sent at 6/14/2022  3:33 PM EDT -----  Regarding: care gap request  06/14/22 3:33 PM    Hello, our patient attached above has had Mammogram completed/performed  Please assist in updating the patient chart by pulling the Care Everywhere (CE) document  The date of service is 7/2/21       Thank you,  Juan Jose Swartz  [facility] PRIMARY [facility]

## 2022-06-17 ENCOUNTER — OFFICE VISIT (OUTPATIENT)
Dept: FAMILY MEDICINE CLINIC | Facility: CLINIC | Age: 57
End: 2022-06-17
Payer: COMMERCIAL

## 2022-06-17 VITALS
WEIGHT: 219 LBS | HEART RATE: 95 BPM | OXYGEN SATURATION: 98 % | BODY MASS INDEX: 38.8 KG/M2 | SYSTOLIC BLOOD PRESSURE: 120 MMHG | TEMPERATURE: 96.4 F | DIASTOLIC BLOOD PRESSURE: 84 MMHG | HEIGHT: 63 IN

## 2022-06-17 DIAGNOSIS — F41.9 ANXIETY: ICD-10-CM

## 2022-06-17 DIAGNOSIS — M67.422 GANGLION OF LEFT ELBOW: ICD-10-CM

## 2022-06-17 DIAGNOSIS — E89.0 POSTOPERATIVE HYPOTHYROIDISM: Primary | ICD-10-CM

## 2022-06-17 DIAGNOSIS — Z12.31 ENCOUNTER FOR SCREENING MAMMOGRAM FOR BREAST CANCER: ICD-10-CM

## 2022-06-17 DIAGNOSIS — Z11.4 SCREENING FOR HIV (HUMAN IMMUNODEFICIENCY VIRUS): ICD-10-CM

## 2022-06-17 DIAGNOSIS — E78.2 MIXED HYPERLIPIDEMIA: ICD-10-CM

## 2022-06-17 DIAGNOSIS — I10 PRIMARY HYPERTENSION: ICD-10-CM

## 2022-06-17 DIAGNOSIS — H92.01 RIGHT EAR PAIN: ICD-10-CM

## 2022-06-17 DIAGNOSIS — E11.9 TYPE 2 DIABETES MELLITUS WITHOUT COMPLICATION, WITHOUT LONG-TERM CURRENT USE OF INSULIN (HCC): ICD-10-CM

## 2022-06-17 PROBLEM — J06.9 URTI (ACUTE UPPER RESPIRATORY INFECTION): Status: RESOLVED | Noted: 2019-07-21 | Resolved: 2022-06-17

## 2022-06-17 PROCEDURE — 3079F DIAST BP 80-89 MM HG: CPT | Performed by: NURSE PRACTITIONER

## 2022-06-17 PROCEDURE — 3725F SCREEN DEPRESSION PERFORMED: CPT | Performed by: NURSE PRACTITIONER

## 2022-06-17 PROCEDURE — 3074F SYST BP LT 130 MM HG: CPT | Performed by: NURSE PRACTITIONER

## 2022-06-17 PROCEDURE — 1036F TOBACCO NON-USER: CPT | Performed by: NURSE PRACTITIONER

## 2022-06-17 PROCEDURE — 3008F BODY MASS INDEX DOCD: CPT | Performed by: NURSE PRACTITIONER

## 2022-06-17 PROCEDURE — 99204 OFFICE O/P NEW MOD 45 MIN: CPT | Performed by: NURSE PRACTITIONER

## 2022-06-17 RX ORDER — DICYCLOMINE HYDROCHLORIDE 10 MG/5ML
SOLUTION ORAL
COMMUNITY
Start: 2018-06-01 | End: 2022-07-13

## 2022-06-17 RX ORDER — LEVOTHYROXINE SODIUM 0.12 MG/1
TABLET ORAL
COMMUNITY
Start: 2021-12-01 | End: 2022-06-17

## 2022-06-17 RX ORDER — DAPAGLIFLOZIN 10 MG/1
10 TABLET, FILM COATED ORAL DAILY
COMMUNITY
Start: 2022-03-28

## 2022-06-17 RX ORDER — AZITHROMYCIN 250 MG/1
TABLET, FILM COATED ORAL
COMMUNITY
Start: 2022-05-14 | End: 2022-07-13 | Stop reason: ALTCHOICE

## 2022-06-17 RX ORDER — FEXOFENADINE HCL AND PSEUDOEPHEDRINE HCI 180; 240 MG/1; MG/1
TABLET, EXTENDED RELEASE ORAL
COMMUNITY
End: 2022-07-13

## 2022-06-17 RX ORDER — GINGER ROOT/GINGER ROOT EXT 262.5 MG
CAPSULE ORAL
COMMUNITY
Start: 2020-09-01 | End: 2022-07-13

## 2022-06-17 RX ORDER — AMOXICILLIN 500 MG
1 CAPSULE ORAL DAILY
COMMUNITY
Start: 2019-01-01

## 2022-06-17 RX ORDER — FLUTICASONE PROPIONATE 50 MCG
SPRAY, SUSPENSION (ML) NASAL
COMMUNITY

## 2022-06-17 RX ORDER — HYDROCHLOROTHIAZIDE 25 MG/1
12.5 TABLET ORAL DAILY
COMMUNITY
Start: 2022-05-18

## 2022-06-17 RX ORDER — CIPROFLOXACIN HYDROCHLORIDE 3.5 MG/ML
SOLUTION/ DROPS TOPICAL
Qty: 5 ML | Refills: 0 | Status: SHIPPED | OUTPATIENT
Start: 2022-06-17 | End: 2022-07-13 | Stop reason: ALTCHOICE

## 2022-06-17 RX ORDER — DIPHENHYDRAMINE HCL 25 MG
TABLET ORAL
COMMUNITY

## 2022-06-17 RX ORDER — HYDROXYZINE HYDROCHLORIDE 10 MG/1
10 TABLET, FILM COATED ORAL EVERY 6 HOURS PRN
COMMUNITY
Start: 2021-09-01

## 2022-06-17 RX ORDER — LEVOTHYROXINE SODIUM 137 UG/1
TABLET ORAL
COMMUNITY
Start: 2022-04-11 | End: 2022-07-13

## 2022-06-17 RX ORDER — ATORVASTATIN CALCIUM 80 MG/1
80 TABLET, FILM COATED ORAL DAILY
COMMUNITY
Start: 2022-04-11 | End: 2022-06-20 | Stop reason: SDUPTHER

## 2022-06-17 RX ORDER — B-COMPLEX WITH VITAMIN C
1 TABLET ORAL DAILY
COMMUNITY
Start: 2017-12-01

## 2022-06-17 RX ORDER — OMEPRAZOLE 20 MG/1
20 CAPSULE, DELAYED RELEASE ORAL DAILY
COMMUNITY
Start: 2022-06-04

## 2022-06-17 NOTE — PROGRESS NOTES
Assessment/Plan:       Diagnoses and all orders for this visit:    Postoperative hypothyroidism  -     TSH, 3rd generation; Future    Type 2 diabetes mellitus without complication, without long-term current use of insulin (HCC)  -     HEMOGLOBIN A1C W/ EAG ESTIMATION; Future    Mixed hyperlipidemia  -     Lipid panel; Future    BMI 38 0-38 9,adult    Right ear pain  -     ciprofloxacin (CILOXAN) 0 3 % ophthalmic solution; 1 drop to right ear three times a day for 5 days    Ganglion of left elbow    Other orders  -     atorvastatin (LIPITOR) 80 mg tablet; Take 80 mg by mouth daily  -     azithromycin (ZITHROMAX) 250 mg tablet; TAKE 2 TABLETS BY MOUTH TODAY, THEN TAKE 1 TABLET DAILY FOR 4 DAYS  -     Vitamin B Complex-C CAPS  -     Cholecalciferol (Vitamin D3) 125 MCG/0 5ML LIQD  -     Farxiga 10 MG TABS; Take 10 mg by mouth daily  -     dicyclomine (BENTYL) 10 mg/5 mL oral solution  -     diphenhydrAMINE (BENADRYL) 25 mg tablet  -     fexofenadine-pseudoephedrine (ALLEGRA-D 24) 180-240 MG per 24 hr tablet  -     fluticasone (FLONASE) 50 mcg/act nasal spray  -     hydrochlorothiazide (HYDRODIURIL) 25 mg tablet; Take 25 mg by mouth daily  -     hydrOXYzine HCL (ATARAX) 10 mg tablet  -     levothyroxine 137 mcg tablet; TAKE 1 TABLET BY MOUTH EVERY DAY IN THE MORNING ON EMPTY STOMACH FOR 90 DAYS  -     Discontinue: levothyroxine 125 mcg tablet  -     Magnesium 400 MG CAPS  -     Omega-3 Fatty Acids (Fish Oil) 1200 MG CAPS  -     omeprazole (PriLOSEC) 20 mg delayed release capsule  -     Probiotic Product (CULTURELLE PROBIOTICS PO)  -     sertraline (ZOLOFT) 50 mg tablet; Take 50 mg by mouth daily      Review lab results - HA1C was 6 2  Discussed elevated cholesterol levels - triglycerides higher  - she is already on a high dose statin - willing to cut back on cheese and gonzalez  Lab work ordered to repeat before next follow-up       Will follow-up Thyroid levels and order US  - due 06/2022 - most recent one demonstrated a surgically absent thyroid and noted lymph nodes in there cervical region, when compared with past were stable  Six years out from Thyroid CA diagnosis and treatment  BP in acceptable range today  Right ear with noted redness - recent sinus infection - had doxycycline due to medication allergies - will treat with drops initially  Lump on elbow appears to be a ganglion cyst - small in size  Will return in 3 months  Mammogram completed on 07/02/2021 at [facility]  Does not qualify for Pap - cervix is absent  No Hep C/Hiv screening today  Will need foot exam at next follow-up  BMI Counseling: Body mass index is 38 79 kg/m²  The BMI is above normal  Nutrition recommendations include encouraging healthy choices of fruits and vegetables  Exercise recommendations include moderate physical activity 150 minutes/week  Rationale for BMI follow-up plan is due to patient being overweight or obese  Depression Screening and Follow-up Plan: Patient was screened for depression during today's encounter  They screened negative with a PHQ-2 score of 0  Subjective:      Patient ID: Mat Shafer is a 64 y o  female  Here today as a new patient to establish care  Hx of T2DM - well controlled with medication  Hx of thyroid CA with surgical removal - on levothyroxine - reports has been difficult to adjust meds to get to normal range  Last TSH was on 06/04/2022 and was 4 22  Reports right ear pain  - recent sinus infection, notes ear has been bothering her for 3 weeks  Also with a two small "lumps" on her left elbow - non-painful  Hx of HLD - on a statin  Hx of HTN - on medication          The following portions of the patient's history were reviewed and updated as appropriate: allergies, current medications, past family history, past medical history, past social history, past surgical history and problem list     Review of Systems   Constitutional: Negative  HENT: Positive for ear pain  Respiratory: Negative  Cardiovascular: Negative  Gastrointestinal: Negative  Musculoskeletal:        See HPI   Neurological: Negative  All other systems reviewed and are negative  Objective:      /84 (BP Location: Left arm, Patient Position: Sitting, Cuff Size: Standard)   Pulse 95   Temp (!) 96 4 °F (35 8 °C)   Ht 5' 3" (1 6 m)   Wt 99 3 kg (219 lb)   SpO2 98%   BMI 38 79 kg/m²          Physical Exam  Constitutional:       Appearance: Normal appearance  HENT:      Right Ear: Hearing, ear canal and external ear normal  Tympanic membrane is erythematous  Left Ear: Hearing, tympanic membrane, ear canal and external ear normal    Cardiovascular:      Rate and Rhythm: Normal rate and regular rhythm  Pulmonary:      Effort: Pulmonary effort is normal       Breath sounds: Normal breath sounds  Musculoskeletal:      Right elbow: Deformity (two small ganglion cysts) present  No swelling  Normal range of motion  Neurological:      General: No focal deficit present  Mental Status: She is alert and oriented to person, place, and time  Mental status is at baseline  Psychiatric:         Mood and Affect: Mood normal          Behavior: Behavior normal          Thought Content:  Thought content normal          Judgment: Judgment normal

## 2022-06-20 ENCOUNTER — TELEPHONE (OUTPATIENT)
Dept: ADMINISTRATIVE | Facility: OTHER | Age: 57
End: 2022-06-20

## 2022-06-20 DIAGNOSIS — E78.2 MIXED HYPERLIPIDEMIA: Primary | ICD-10-CM

## 2022-06-20 NOTE — TELEPHONE ENCOUNTER
----- Message from Vikram Rothman sent at 6/20/2022 10:36 AM EDT -----  Regarding: Care Gap Update  06/20/22 10:36 AM    Hello, our patient attached above has had Hepatitis C completed/performed  Please assist in updating the patient chart by pulling the Care Everywhere (CE) document  The date of service is 06/24/2020       Thank you,  Vikram Rothman   [facility]

## 2022-06-21 RX ORDER — ATORVASTATIN CALCIUM 80 MG/1
80 TABLET, FILM COATED ORAL DAILY
Qty: 90 TABLET | Refills: 1 | Status: SHIPPED | OUTPATIENT
Start: 2022-06-21

## 2022-06-21 NOTE — TELEPHONE ENCOUNTER
Upon review of the In Basket request we were able to locate, review, and update the patient chart as requested for Hepatitis C   Any additional questions or concerns should be emailed to the Practice Liaisons via [email or web address] email, please do not reply via In Basket      Thank you  Wilfredo Ramirez

## 2022-06-29 ENCOUNTER — TELEPHONE (OUTPATIENT)
Dept: FAMILY MEDICINE CLINIC | Facility: CLINIC | Age: 57
End: 2022-06-29

## 2022-06-29 DIAGNOSIS — E11.9 TYPE 2 DIABETES MELLITUS WITHOUT COMPLICATION, WITHOUT LONG-TERM CURRENT USE OF INSULIN (HCC): Primary | ICD-10-CM

## 2022-06-29 NOTE — TELEPHONE ENCOUNTER
Patient called and wanted to start seeing sara the pharmacist for services, she called here because she though that sara was only located at this office  Could you place a referral for her to see sara?

## 2022-07-01 ENCOUNTER — HOSPITAL ENCOUNTER (OUTPATIENT)
Dept: RADIOLOGY | Facility: CLINIC | Age: 57
Discharge: HOME | End: 2022-07-01
Payer: COMMERCIAL

## 2022-07-01 VITALS — BODY MASS INDEX: 38.98 KG/M2 | WEIGHT: 220 LBS | HEIGHT: 63 IN

## 2022-07-01 DIAGNOSIS — Z12.31 ENCOUNTER FOR SCREENING MAMMOGRAM FOR BREAST CANCER: ICD-10-CM

## 2022-07-01 PROCEDURE — 77067 SCR MAMMO BI INCL CAD: CPT

## 2022-07-01 PROCEDURE — 77063 BREAST TOMOSYNTHESIS BI: CPT

## 2022-07-13 ENCOUNTER — CLINICAL SUPPORT (OUTPATIENT)
Dept: FAMILY MEDICINE CLINIC | Facility: CLINIC | Age: 57
End: 2022-07-13

## 2022-07-13 DIAGNOSIS — F41.9 ANXIETY: Primary | ICD-10-CM

## 2022-07-13 DIAGNOSIS — E11.9 TYPE 2 DIABETES MELLITUS WITHOUT COMPLICATION, WITHOUT LONG-TERM CURRENT USE OF INSULIN (HCC): ICD-10-CM

## 2022-07-13 RX ORDER — FEXOFENADINE HCL 180 MG/1
180 TABLET ORAL DAILY
COMMUNITY

## 2022-07-13 RX ORDER — LEVOTHYROXINE SODIUM 0.15 MG/1
150 TABLET ORAL DAILY
COMMUNITY

## 2022-07-13 NOTE — PROGRESS NOTES
[facility]  Michelle Evans, PharmD, Lionel Blancas is a 64 y o  female who was referred to the pharmacist for medication reconciliation and education, referred by Kamilah Sotelo, [address] did present with all prescribed and OTC medications for medication review  Assessment/ Plan     General Anxiety Disorder:  · On Sertraline 150 mg total daily  She had a partial response to this medication  · Uses hydralazine prn for breakthrough anxiety attacks  · Discussed option of maximizing sertraline versus augmentation  She is interested in augmentation with additional agent of buspirone   · Recommendation:  · If PCP is in agreement initiate buspirone 10 mg daily     Medication Reconciliation:   · Patient was counseled on current active medications and provided with a medication list which included, but was not limited to, names, dosages, indications and directions for use  Medication list updated to reflect medications pt is currently taking    Current Medication Problem Identified Recommendation for Resolution   Hydrochlorothiazide  Reported as 25 mg daily on med list  Patient has been taking 1/2 tab or 12 5 mg daily for over a year  Home and in office Bps have been controllled Recommend to continue lower dose of hydrochlorothiazide 12 5 mg daily  Med list updated  Levothyroxine  Dose in med list was 137 mcg  She has been taking 150 mcg for several months now  Most recent TSH at goal Recommend to continue 150 mcg daily  Med list updated   Atorvastatin 80 mg daily Most recent LDL elevated  On max dose of atorvastatin  Patient to work on diet and lifestyle changes  If next lipid panel remains elevated can consider transitioning atorvastatin to rosuvastatin 40 mg which has more lipid lowering        Follow-Up: 2 weeks    Subjective   Medication list reviewed with patient    Pre-DM/ HLD   Current medications:  o Atorvastatin 80 mg daily - discussed potential to rosuvastatin in furutre for more A1c lowering   o Fish Oil 1200 mg daily- has been on for several years now  o Farxiga Tabs 10 mg daily    Past medication  o Metformin in past due to side effects   Most recent A1c controlled at 6 2%   LDL elevated at 128    Urine/creat ratio < 30    GFR 88   Doesn't BG check at home consistently  No side effects to [place]  Pt would like to continue for now  If A1c improves then can consider decrease Farxiga dose or stopping medication  HTN  · Current medication:  · Lisinopril 40 mg daily   · Hydrochlorothiazide 25 mg- has been taking 1/2 tab (12 5 mg) daily for at least 1 year  · Most recent in office /84  Home Bps 120-130/ 70-80     Hypothyroidism   · Current medication  · Levothyroxine 150 mcg daily since Jan    · Most recent TSH WNL at 4 22    Anxiety   · Current Medications:  · hydrOXYzine HCL 4x per day prn panic attacks - has not needed recently since school is out  When school is in 1 x per month  · Sertraline 100 mg daily + 50 mg daily in evening  - since increasing it did help initially and then would have episodes  · Sertraline first helped when medication was started  She has noted partial response to it  Still experiences break through anxiety attacks, 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  · Sodium WNL    Acid Reflux   · Current medication:  · Omeprazole 20 mg daily   · Works for acid reflux  Has tried to go off and had a lot of sx  GI doctor rec she continue       IBS  · Dicyclomine 10 mg prn for IBS 4x per day     Allergies (prn)   · Bendryl prn at night only during allergy season   · Flonase  · Allegra     OTC/ suplements  · CULTURELLE PROBIOTICS PO   Fish Oil Caps   Magnesium Caps   Vitamin B Complex-C Caps   Calcium +vitD   Collagen - VitC   Fiber supreme     Medications removed from med list (therapy completed)   azithromycin   ciprofloxacin        Objective     BP Readings from Last 3 Encounters:   06/17/22 120/84   10/03/21 142/87   03/24/21 148/92       Pulse Readings from Last 3 Encounters:   06/17/22 95   10/24/21 101   10/03/21 97       No results found for: SODIUM, K, CL, CO2, BUN, CREATININE, GLUC, CALCIUM      Pharmacist Tracking Tool       Pharmacist Tracking Tool  Reason For Outreach: Embedded Pharmacist  Demographics:  Intervention Method: Phone  Type of Intervention: New  Topics Addressed: Anxiety and Polypharmacy  Pharmacologic Interventions: Medication Initiation and Med Rec  Non-Pharmacologic Interventions: Disease state education and Home Monitoring  Time:  Direct Patient Care: 60 mins  Care Coordination: 20 mins  Recommendation Recipient: Provider  Outcome: Accepted

## 2022-07-14 RX ORDER — DICYCLOMINE HYDROCHLORIDE 10 MG/1
10 CAPSULE ORAL
COMMUNITY

## 2022-07-14 RX ORDER — BUSPIRONE HYDROCHLORIDE 10 MG/1
10 TABLET ORAL 3 TIMES DAILY
Qty: 30 TABLET | Refills: 1 | Status: SHIPPED | OUTPATIENT
Start: 2022-07-14 | End: 2022-08-03

## 2022-08-03 ENCOUNTER — CLINICAL SUPPORT (OUTPATIENT)
Dept: FAMILY MEDICINE CLINIC | Facility: CLINIC | Age: 57
End: 2022-08-03

## 2022-08-03 DIAGNOSIS — F41.9 ANXIETY: ICD-10-CM

## 2022-08-03 RX ORDER — BUSPIRONE HYDROCHLORIDE 10 MG/1
10 TABLET ORAL 2 TIMES DAILY
Qty: 60 TABLET | Refills: 1 | Status: SHIPPED | OUTPATIENT
Start: 2022-08-03 | End: 2022-08-17

## 2022-08-03 NOTE — PROGRESS NOTES
[facility]  Albania Barnett, PharmD, Sharita Murillo is a 64 y o  female who was referred to the pharmacist for medication reconciliation and education, referred by KEIRA Hays     Assessment/ Plan     General Anxiety Disorder:  · On Sertraline 150 mg total daily  She had a partial response to this medication  · Uses hydralazine prn for breakthrough anxiety attacks  · Buspirone 10 mg daily started for augmentation  Patient reports this has been helping with her anxiety  She did have feeling of "jitteriness" for first week but this has subsided  Recommendation:  · If PCP is in agreement titrate up to buspirone 10 mg BID  Slowly titrating up dose as tolerated  Usual dose for buspirone for VIDAL is 20 to 30 mg/day in 2 to 3 divided doses     Follow-Up: 2 weeks    Subjective     Anxiety   · Current Medications:  · hydrOXYzine HCL 4x per day prn panic attacks    · Sertraline 100 mg daily + 50 mg daily in evening  · Busprione 10 mg daily  · Hx: Sertraline first helped when medication was started  She has noted partial response to it  Still experiences break through anxiety attacks, 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  · Update 8/3: Patient started buspirone 10 mg daily on 7/17/22  For first week she did had jitteriness but this has since subsides  Now she is tolerating well without issues  She has noticed that it has helped with her anxiety  She would like to continue to titrate up the dose now while she is not in school session  School session begins 8/24/22    · Sodium WNL    Objective     BP Readings from Last 3 Encounters:   06/17/22 120/84   10/03/21 142/87   03/24/21 148/92       Pulse Readings from Last 3 Encounters:   06/17/22 95   10/24/21 101   10/03/21 97       No results found for: SODIUM, K, CL, CO2, BUN, CREATININE, GLUC, CALCIUM      Pharmacist Tracking Tool       Pharmacist Tracking Tool  Reason For Outreach: Embedded Pharmacist  Demographics:  Intervention Method: Phone  Type of Intervention: New  Topics Addressed: Anxiety and Polypharmacy  Pharmacologic Interventions: Dose or Frequency Adjusted  Non-Pharmacologic Interventions: Medication/Device education  Time:  Direct Patient Care: 15 mins  Care Coordination: 10 mins  Recommendation Recipient: Provider  Outcome: Accepted

## 2022-08-17 ENCOUNTER — CLINICAL SUPPORT (OUTPATIENT)
Dept: FAMILY MEDICINE CLINIC | Facility: CLINIC | Age: 57
End: 2022-08-17

## 2022-08-17 DIAGNOSIS — F41.9 ANXIETY: ICD-10-CM

## 2022-08-17 RX ORDER — BUSPIRONE HYDROCHLORIDE 10 MG/1
10 TABLET ORAL DAILY
Qty: 60 TABLET | Refills: 1
Start: 2022-08-17 | End: 2022-08-31

## 2022-08-17 NOTE — PROGRESS NOTES
[facility]  Agustín Denis, PharmD, Marilee Maldonado is a 64 y o  female who was referred to the pharmacist for medication reconciliation and education, referred by KEIRA Beckwith     Assessment/ Plan     General Anxiety Disorder:  · On Sertraline 150 mg total daily  She had a partial response to this medication  · Uses hydralazine prn for breakthrough anxiety attacks  · Buspirone for augmentation  Patient reports this has been helping with her anxiety  Increased dose of buspirone 10 mg BID caused more side effects (jitteriness, depression)  She felt better on 10 mg once daily  Recommendation:  · If PCP is in agreement continue lower dose of buspirone 10 mg daily  Follow-Up: 8 weeks    Subjective     Anxiety   · Current Medications:  · hydrOXYzine HCL 4x per day prn panic attacks    · Sertraline 100 mg daily + 50 mg daily in evening  · Busprione 10 mg daily  · Hx: Sertraline first helped when medication was started  She has noted partial response to it  Still experiences break through anxiety attacks, 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  · Update 8/3: Patient started buspirone 10 mg daily on 7/17/22  For first week she did had jitteriness but this has since subsides  Now she is tolerating well without issues  She has noticed that it has helped with her anxiety  She would like to continue to titrate up the dose now while she is not in school session  School session begins 8/24/22  · Update 8/17: Patient increased buspirone to 10 mg BID on 8/5/22  States she immediately felt more side effects to the higher dose but she stuck it out 8/13  At that time she was experiencing jitteriness and worsening depression sx  Since then decreased back to 10 mg once daily and feels this dose has been helping with anxiety but not causing excess side effects     · Sodium WNL    Objective     BP Readings from Last 3 Encounters:   06/17/22 120/84   10/03/21 142/87   03/24/21 148/92       Pulse Readings from Last 3 Encounters:   06/17/22 95   10/24/21 101   10/03/21 97       No results found for: SODIUM, K, CL, CO2, BUN, CREATININE, GLUC, CALCIUM      Pharmacist Tracking Tool       Pharmacist Tracking Tool  Reason For Outreach: Embedded Pharmacist  Demographics:  Intervention Method: Phone  Type of Intervention: New  Topics Addressed: Anxiety and Polypharmacy  Pharmacologic Interventions: Dose or Frequency Adjusted  Non-Pharmacologic Interventions: Medication/Device education  Time:  Direct Patient Care: 15 mins  Care Coordination: 10 mins  Recommendation Recipient: Provider  Outcome: Accepted

## 2022-08-31 ENCOUNTER — CLINICAL SUPPORT (OUTPATIENT)
Dept: FAMILY MEDICINE CLINIC | Facility: CLINIC | Age: 57
End: 2022-08-31

## 2022-08-31 DIAGNOSIS — F41.9 ANXIETY: Primary | ICD-10-CM

## 2022-08-31 NOTE — PROGRESS NOTES
[facility]  Brennan Cavanaugh, PharmD, Fran Lang is a 64 y o  female who was referred to the pharmacist for medication reconciliation and education, referred by KEIRA Medellin     Assessment/ Plan     General Anxiety Disorder:  · On Sertraline 150 mg total daily  She has had a partial response to this medication  · Uses hydralazine prn for breakthrough anxiety attacks which has been effective  · Buspirone for augmentation however since school started patient reports that buspirone has not been working  She stopped the medication on her own   · Discussed next options such as increasing current SSRI to max effective dose for VIDAL or switching to different SSRI/SNI  She would like to increase her current medication for now  Recommendation:  If PCP is in agreement   · Discontinue Buspirone   · Increase sertraline to 200 mg daily (max dose for VIDAL)     Follow-Up: 6 weeks    Subjective     Anxiety   · Current Medications:  · hydrOXYzine HCL 4x per day prn panic attacks    · Sertraline 100 mg daily + 50 mg daily in evening  · Busprione 10 mg daily- patient self discontinued last week   · Hx: Sertraline first helped when medication was started  She has noted partial response to it  Still experiences break through anxiety attacks, 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  · Update 8/3: Patient started buspirone 10 mg daily on 7/17/22  For first week she did had jitteriness but this has since subsides  Now she is tolerating well without issues  She has noticed that it has helped with her anxiety  She would like to continue to titrate up the dose now while she is not in school session  School session begins 8/24/22  · Update 8/17: Patient increased buspirone to 10 mg BID on 8/5/22  States she immediately felt more side effects to the higher dose but she stuck it out 8/13   At that time she was experiencing jitteriness and worsening depression sx  Since then decreased back to 10 mg once daily and feels this dose has been helping with anxiety but not causing excess side effects  · Update 8/31/22: no longer finding buspirone effective  She stopped the medication and noticed no change in anxiety symptoms  Overall feels that sertraline at current dose is not effectively controlling her VIDAL     · Sodium WNL    Objective     BP Readings from Last 3 Encounters:   06/17/22 120/84   10/03/21 142/87   03/24/21 148/92       Pulse Readings from Last 3 Encounters:   06/17/22 95   10/24/21 101   10/03/21 97       No results found for: SODIUM, K, CL, CO2, BUN, CREATININE, GLUC, CALCIUM      Pharmacist Tracking Tool       Pharmacist Tracking Tool  Reason For Outreach: Embedded Pharmacist  Demographics:  Intervention Method: Phone  Type of Intervention: Follow-Up  Topics Addressed: Anxiety and Polypharmacy  Pharmacologic Interventions: Medication Discontinuation and Dose or Frequency Adjusted  Non-Pharmacologic Interventions: Medication/Device education  Time:  Direct Patient Care: 15 mins  Care Coordination: 10 mins  Recommendation Recipient: Provider  Outcome: Accepted

## 2022-09-02 RX ORDER — SERTRALINE HYDROCHLORIDE 100 MG/1
200 TABLET, FILM COATED ORAL DAILY
Qty: 60 TABLET | Refills: 1 | Status: SHIPPED | OUTPATIENT
Start: 2022-09-02 | End: 2022-10-04

## 2022-09-15 ENCOUNTER — RA CDI HCC (OUTPATIENT)
Dept: OTHER | Facility: HOSPITAL | Age: 57
End: 2022-09-15

## 2022-09-15 NOTE — PROGRESS NOTES
[facility]  coding opportunities       Chart reviewed, no opportunity found: CHART REVIEWED, NO OPPORTUNITY FOUND     Patients Insurance     Commercial Insurance: [address] [place] [address]

## 2022-09-19 DIAGNOSIS — F41.9 ANXIETY: Primary | ICD-10-CM

## 2022-09-19 RX ORDER — HYDROXYZINE HYDROCHLORIDE 10 MG/1
10 TABLET, FILM COATED ORAL EVERY 6 HOURS PRN
Qty: 30 TABLET | Refills: 1 | Status: SHIPPED | OUTPATIENT
Start: 2022-09-19

## 2022-09-25 LAB
CHOLEST SERPL-MCNC: 191 MG/DL
CHOLEST/HDLC SERPL: 3 (CALC)
EST. AVERAGE GLUCOSE BLD GHB EST-MCNC: 123 MG/DL
EST. AVERAGE GLUCOSE BLD GHB EST-SCNC: 6.8 MMOL/L
HBA1C MFR BLD: 5.9 % OF TOTAL HGB
HDLC SERPL-MCNC: 63 MG/DL
LDLC SERPL CALC-MCNC: 100 MG/DL (CALC)
NONHDLC SERPL-MCNC: 128 MG/DL (CALC)
TRIGL SERPL-MCNC: 189 MG/DL
TSH SERPL-ACNC: 1.18 MIU/L (ref 0.4–4.5)

## 2022-09-25 PROCEDURE — 3044F HG A1C LEVEL LT 7.0%: CPT | Performed by: NURSE PRACTITIONER

## 2022-09-29 ENCOUNTER — OFFICE VISIT (OUTPATIENT)
Dept: FAMILY MEDICINE CLINIC | Facility: CLINIC | Age: 57
End: 2022-09-29
Payer: COMMERCIAL

## 2022-09-29 VITALS
HEIGHT: 63 IN | OXYGEN SATURATION: 96 % | WEIGHT: 223.4 LBS | DIASTOLIC BLOOD PRESSURE: 70 MMHG | BODY MASS INDEX: 39.58 KG/M2 | SYSTOLIC BLOOD PRESSURE: 130 MMHG | TEMPERATURE: 98.3 F | HEART RATE: 95 BPM

## 2022-09-29 DIAGNOSIS — E89.0 POSTOPERATIVE HYPOTHYROIDISM: ICD-10-CM

## 2022-09-29 DIAGNOSIS — E11.9 TYPE 2 DIABETES MELLITUS WITHOUT COMPLICATION, WITHOUT LONG-TERM CURRENT USE OF INSULIN (HCC): Primary | ICD-10-CM

## 2022-09-29 DIAGNOSIS — E78.2 MIXED HYPERLIPIDEMIA: ICD-10-CM

## 2022-09-29 DIAGNOSIS — C73 MALIGNANT NEOPLASM OF THYROID GLAND (HCC): ICD-10-CM

## 2022-09-29 DIAGNOSIS — I10 PRIMARY HYPERTENSION: ICD-10-CM

## 2022-09-29 DIAGNOSIS — F41.9 ANXIETY: ICD-10-CM

## 2022-09-29 DIAGNOSIS — Z23 NEEDS FLU SHOT: ICD-10-CM

## 2022-09-29 PROCEDURE — 90682 RIV4 VACC RECOMBINANT DNA IM: CPT | Performed by: NURSE PRACTITIONER

## 2022-09-29 PROCEDURE — 90471 IMMUNIZATION ADMIN: CPT | Performed by: NURSE PRACTITIONER

## 2022-09-29 PROCEDURE — 99214 OFFICE O/P EST MOD 30 MIN: CPT | Performed by: NURSE PRACTITIONER

## 2022-09-29 PROCEDURE — 3078F DIAST BP <80 MM HG: CPT | Performed by: NURSE PRACTITIONER

## 2022-09-29 PROCEDURE — 3075F SYST BP GE 130 - 139MM HG: CPT | Performed by: NURSE PRACTITIONER

## 2022-09-29 NOTE — PROGRESS NOTES
Name: Alfredo Walker      : 1965      MRN: [id number]  Encounter Provider: KEIRA Lang  Encounter Date: 2022   Encounter department: [address] [place] [address] PRIMARY CARE    Assessment & Plan     1  Type 2 diabetes mellitus without complication, without long-term current use of insulin ([facility] )  -     Ambulatory Referral to Endocrinology; Future    2  Mixed hyperlipidemia    3  Anxiety    4  Primary hypertension    5  Postoperative hypothyroidism    6  Malignant neoplasm of thyroid gland ([facility] )    7  Needs flu shot  -     influenza vaccine, quadrivalent, recombinant, PF, 0 5 mL, for patients 18 yr+ (FLUBLOK)    Recent lab work reviewed  HA1C controlled at present  BP in acceptable range  Continue with current medication dosages  Will refer to endocrine per her request       Anxiety controlled at this time  Subjective      Here today for a follow-up  She is with a hx of T2DM - currently on Farxiga with good effect  HA1C with recent lab draw was 5 9  Hx of HLD - currently on a statin  Hx of allergies - on medication for this also  HTN controlled with medication at this time  Anxiety controlled at this time  Review of Systems   Constitutional: Negative  HENT: Negative  Respiratory: Negative  Cardiovascular: Negative  Gastrointestinal: Negative  Neurological: Negative  All other systems reviewed and are negative        Current Outpatient Medications on File Prior to Visit   Medication Sig    atorvastatin (LIPITOR) 80 mg tablet Take 1 tablet (80 mg total) by mouth daily    Calcium Carb-Cholecalciferol (Calcium/Vitamin D) 600-400 MG-UNIT TABS Take 1 tablet by mouth 2 (two) times a day    Collagen-Vitamin C (SUPER COLLAGEN PLUS VITAMIN C PO) Take 1 each by mouth in the morning    dicyclomine (BENTYL) 10 mg capsule Take 10 mg by mouth 4 (four) times a day (before meals and at bedtime)    diphenhydrAMINE (BENADRYL) 25 mg tablet     Farxiga 10 MG TABS Take 10 mg by mouth daily    fexofenadine (ALLEGRA) 180 MG tablet Take 180 mg by mouth daily PRN    fluticasone (FLONASE) 50 mcg/act nasal spray     hydrochlorothiazide (HYDRODIURIL) 25 mg tablet Take 12 5 mg by mouth daily    hydrOXYzine HCL (ATARAX) 10 mg tablet Take 1 tablet (10 mg total) by mouth every 6 (six) hours as needed for anxiety    levothyroxine 150 mcg tablet Take 150 mcg by mouth daily    lisinopril (ZESTRIL) 40 mg tablet Take 40 mg by mouth    Magnesium 400 MG CAPS Take 1 each by mouth daily    Misc Natural Products (FIBER SUPREME PO) Take 1 Scoop by mouth daily    Omega-3 Fatty Acids (Fish Oil) 1200 MG CAPS Take 1 capsule by mouth in the morning    omeprazole (PriLOSEC) 20 mg delayed release capsule Take 20 mg by mouth daily    Probiotic Product (CULTURELLE PROBIOTICS PO) Take 1 each by mouth daily    sertraline (ZOLOFT) 100 mg tablet Take 2 tablets (200 mg total) by mouth daily    Vitamin B Complex-C CAPS Take 1 each by mouth in the morning       Objective     /70 (BP Location: Left arm, Patient Position: Sitting, Cuff Size: Standard)   Pulse 95   Temp 98 3 °F (36 8 °C)   Ht 5' 3" (1 6 m)   Wt 101 kg (223 lb 6 4 oz)   SpO2 96%   BMI 39 57 kg/m²     Physical Exam  Constitutional:       Appearance: Normal appearance  Cardiovascular:      Rate and Rhythm: Normal rate and regular rhythm  Pulmonary:      Effort: Pulmonary effort is normal       Breath sounds: Normal breath sounds  Neurological:      Mental Status: She is alert  Psychiatric:         Mood and Affect: Mood normal          Behavior: Behavior normal          Thought Content:  Thought content normal          Judgment: Judgment normal        KEIRA Starkey

## 2022-10-04 DIAGNOSIS — F41.9 ANXIETY: ICD-10-CM

## 2022-10-04 RX ORDER — SERTRALINE HYDROCHLORIDE 100 MG/1
TABLET, FILM COATED ORAL
Qty: 180 TABLET | Refills: 1 | Status: SHIPPED | OUTPATIENT
Start: 2022-10-04

## 2022-10-08 DIAGNOSIS — F41.9 ANXIETY: ICD-10-CM

## 2022-10-10 RX ORDER — BUSPIRONE HYDROCHLORIDE 10 MG/1
TABLET ORAL
Qty: 60 TABLET | Refills: 1 | Status: SHIPPED | OUTPATIENT
Start: 2022-10-10 | End: 2022-10-12 | Stop reason: SINTOL

## 2022-10-12 ENCOUNTER — CLINICAL SUPPORT (OUTPATIENT)
Dept: FAMILY MEDICINE CLINIC | Facility: CLINIC | Age: 57
End: 2022-10-12

## 2022-10-12 DIAGNOSIS — F41.9 ANXIETY: Primary | ICD-10-CM

## 2022-10-12 NOTE — PROGRESS NOTES
[facility]  Jose A Toribio, [place], Breanne Webster is a 62 y o  female who was referred to the pharmacist for medication reconciliation and education, referred by KEIRA Shields     Assessment/ Plan     General Anxiety Disorder:  · Sertraline increased to max dose of 200 mg  Tolerating well  Reports anxiety symptoms have been controlled  Takes hydroxyzine as needed which also helps to control anxiety  Recommendation:  If PCP is in agreement   · Continue sertraline 200 mg daily and Hydroxyzine as needed     Follow-Up: as needed- patient has contact information for clinical pharmacist if needed     Subjective     Anxiety   · Current Medications:  · hydrOXYzine HCL 4x per day prn panic attacks    · Sertraline 200 mg daily   · Previous medications  · Buspirone- discontinued due to side effects      · Anxiety Hx: Sertraline first helped when medication was started  She has noted partial response to it  Still experiences break through anxiety attacks, 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  · Update 8/3: Patient started buspirone 10 mg daily on 7/17/22  For first week she did had jitteriness but this has since subsides  Now she is tolerating well without issues  She has noticed that it has helped with her anxiety  She would like to continue to titrate up the dose now while she is not in school session  School session begins 8/24/22  · Update 8/17: Patient increased buspirone to 10 mg BID on 8/5/22  States she immediately felt more side effects to the higher dose but she stuck it out 8/13  At that time she was experiencing jitteriness and worsening depression sx  Since then decreased back to 10 mg once daily and feels this dose has been helping with anxiety but not causing excess side effects  · Update 8/31/22: no longer finding buspirone effective   She stopped the medication and noticed no change in anxiety symptoms  Overall feels that sertraline at current dose is not effectively controlling her VIDAL  · Update: 10/12/22: Higher dose of Sertraline 200 mg currently controlling anxiety symptoms  Denies side effects to higher dose  She will take hydroxyzine as needed if she knows she is going to have a stressful day which has also been helping  Overall patient is content with current medication therapy     · Sodium WNL    Objective     BP Readings from Last 3 Encounters:   09/29/22 130/70   06/17/22 120/84   10/03/21 142/87       Pulse Readings from Last 3 Encounters:   09/29/22 95   06/17/22 95   10/24/21 101       No results found for: SODIUM, K, CL, CO2, BUN, CREATININE, GLUC, CALCIUM      Pharmacist Tracking Tool       Pharmacist Tracking Tool  Reason For Outreach: Embedded Pharmacist  Demographics:  Intervention Method: Phone  Type of Intervention: Follow-Up  Topics Addressed: Anxiety  Pharmacologic Interventions: N/A  Non-Pharmacologic Interventions: Home Monitoring  Time:  Direct Patient Care: 15 mins  Care Coordination: 10 mins  Recommendation Recipient: N/A  Outcome: N/A

## 2022-10-13 ENCOUNTER — AMB VIDEO VISIT (OUTPATIENT)
Dept: OTHER | Facility: HOSPITAL | Age: 57
End: 2022-10-13
Payer: COMMERCIAL

## 2022-10-13 ENCOUNTER — TELEPHONE (OUTPATIENT)
Dept: FAMILY MEDICINE CLINIC | Facility: CLINIC | Age: 57
End: 2022-10-13

## 2022-10-13 VITALS — RESPIRATION RATE: 16 BRPM | TEMPERATURE: 100.9 F

## 2022-10-13 DIAGNOSIS — U07.1 COVID: Primary | ICD-10-CM

## 2022-10-13 PROCEDURE — 99212 OFFICE O/P EST SF 10 MIN: CPT | Performed by: NURSE PRACTITIONER

## 2022-10-13 NOTE — CARE ANYWHERE EVISITS
Visit Summary for Jose Renteria - Gender: Female - Date of Birth: [id number]  Date: [id number] - Duration: 10 minutes  Patient: Jose Renteria  Provider: Paulo CROCKETT    Patient Contact Information  Address  [address]; [address] [place], [place] [address]  [phone]    Visit Topics  Flu-Like Symptoms [Added By: Self - 2022-10-13]    Triage Questions   What is your current physical address in the event of a medical emergency? Answer []  Are you allergic to any medications? Answer []  Are you now or could you be pregnant? Answer []  Do you have any immune system compromise or chronic lung   disease? Answer []  Do you have any vulnerable family members in the home (infant, pregnant, cancer, elderly)? Answer []     Conversation Transcripts  [0A][0A] [Notification] You are connected with Tay Gibbons, Urgent Care Specialist [0A][Notification] Riya Ramesh is located in [place]  [0A][Notification] Riya Ramesh has shared health history  Db Crowleyy  [0A]    Diagnosis  COVID-19    Procedures  Value: [id number] Code: CPT-4 UNLISTED E&M SERVICE    Medications Prescribed    No prescriptions ordered    Electronically signed by: Tay Torres(NPI [id number])

## 2022-10-13 NOTE — PATIENT INSTRUCTIONS
Positive for covid on home test  Rest and drink extra fluids  Tylenol as needed for pain  Go to ER with any worsening symptoms, chest pain, sob, difficulty breathing, lethargy, confusion, dehyrdration, persistent fever 103 or higher  Discussed Paxlovid including side effects, at this time patient will defer  Patient is vaccinated, not over 72 but does have some increased risk factors  Overall at this time suspect patient will get better and not progress to severe disease with out taking medication but discussed with patient medication can be taken up to 5 days after onset of symptoms  She should follow up with PCP with any worsening symptoms  Please Be Courteous:  You are being tested for novel coronavirus (COVID-19) your test is pending at this time  You need to self quarantine at least until you have the results back  This means go home and stay home  Have someone else  your medications for you and bring them to you and drop them off at your door  Tests typically come back in 1-3 days  Results can be found in the "COVID-19" section of your [employer] account  In Your Home:  If you live with other people, trying to avoid common spaces and disinfect areas that you come into contact with  Per the CDC's recommendations: persons who suspect that they might have COVID-19 should isolate, stay at home, and use a separate bedroom and bathroom if feasible  Isolation should begin even before seeking testing and before test results become available  All household members should start wearing a mask in the home, particularly in shared spaces where appropriate distancing is not possible  Take Care of Yourself:  Try to sleep on your stomach as much as possible  If you have the ability to, take vitamin D3 2000 IU by mouth daily, vitamin-C 1000 mg by mouth twice a day, a multivitamin daily to help boost your immune system  You should check your temperature twice day    Return to the emergency department for any severe shortness of breath or pulse ox less than 90%  If You Test Positive for COVID-19 (Isolate)     Everyone, regardless of vaccination status:     Stay home for 5 days  If you have no symptoms or your symptoms are resolving after 5 days, you can leave your house  Continue to wear a mask around others for 5 additional days  If you have a fever, continue to stay home until your fever resolves  If You Were Exposed to Someone with COVID-19 (Quarantine)  If you:  Have been boosted  OR  Completed the primary series of Pfizer or Moderna vaccine within the last 6 months  OR  Completed the primary series of J&J vaccine within the last 2 months     Wear a mask around others for 10 days  Test on day 5, if possible  If you develop symptoms get a test and stay home  If you:  Completed the primary series of Pfizer or Moderna vaccine over 6 months ago and are not boosted  OR  Completed the primary series of J&J over 2 months ago and are not boosted  OR  Are unvaccinated     Stay home for 5 days  After that continue to wear a mask around others for 5 additional days  If you can’t quarantine you must wear a mask for 10 days  Test on day 5 if possible       If you develop symptoms get a test and stay home

## 2022-10-13 NOTE — PROGRESS NOTES
Video Visit - Chris Arthur 62 y o  female MRN: [id number]    REQUIRED DOCUMENTATION:         1  This service was provided via [email or web address]  2  Provider located at [address] [place] [phone]  3  [facility] provider: KEIRA Farias  4  Identify all parties in room with patient during [facility] visit:  otoniel ross  5  After connecting through [employer], patient was identified by name and date of birth  Patient was then informed that this was a Telemedicine visit and that the exam was being conducted confidentially over secure lines  My office door was closed  No one else was in the room  Patient acknowledged consent and understanding of privacy and security of the Telemedicine visit  I informed the patient that I have reviewed their record in Epic and presented the opportunity for them to ask any questions regarding the visit today  The patient agreed to participate  This is a 62year old female here today for video visit  She states symptoms started about 2 days ago  She is having sore throat, nasal congestion and sinus pressure  She has cough  She denies sob or chest pain  She tested positive for covid today  She is vaccinated and did have booster  She is due for the newest booster  She denies any loss of taste or smell  She is drinking but has decreased appetite  Review of Systems   Constitutional: Positive for activity change, chills, fatigue and fever  HENT: Positive for congestion, rhinorrhea, sinus pressure, sinus pain and sore throat  Respiratory: Positive for cough  Negative for shortness of breath and wheezing  Cardiovascular: Negative  Musculoskeletal: Negative  Skin: Negative  Neurological: Positive for headaches  Psychiatric/Behavioral: Negative  Vitals:    10/13/22 0924   Resp: 16   Temp: (!) 100 9 °F (38 3 °C)     Physical Exam  Constitutional:       General: She is not in acute distress  Appearance: Normal appearance  She is normal weight  She is not ill-appearing or toxic-appearing  HENT:      Head: Normocephalic and atraumatic  Mouth/Throat:      Pharynx: Posterior oropharyngeal erythema present  Eyes:      Conjunctiva/sclera: Conjunctivae normal    Pulmonary:      Effort: Pulmonary effort is normal  No respiratory distress  Comments: No cough or audible wheezing during video visit  Able to speak in full sentences    Skin:     Comments: No rash on head or neck  Neurological:      Mental Status: She is alert and oriented to person, place, and time  Psychiatric:         Mood and Affect: Mood normal          Behavior: Behavior normal          Thought Content: Thought content normal          Judgment: Judgment normal        Diagnoses and all orders for this visit:    COVID      Patient Instructions          Positive for covid on home test  Rest and drink extra fluids  Tylenol as needed for pain  Go to ER with any worsening symptoms, chest pain, sob, difficulty breathing, lethargy, confusion, dehyrdration, persistent fever 103 or higher  Discussed Paxlovid including side effects, at this time patient will defer  Patient is vaccinated, not over 72 but does have some increased risk factors  Overall at this time suspect patient will get better and not progress to severe disease with out taking medication but discussed with patient medication can be taken up to 5 days after onset of symptoms  She should follow up with PCP with any worsening symptoms  Please Be Courteous:  You are being tested for novel coronavirus (COVID-19) your test is pending at this time  You need to self quarantine at least until you have the results back  This means go home and stay home  Have someone else  your medications for you and bring them to you and drop them off at your door  Tests typically come back in 1-3 days  Results can be found in the "COVID-19" section of your [employer] account       In Your Home:  If you live with other people, trying to avoid common spaces and disinfect areas that you come into contact with  Per the CDC's recommendations: persons who suspect that they might have COVID-19 should isolate, stay at home, and use a separate bedroom and bathroom if feasible  Isolation should begin even before seeking testing and before test results become available  All household members should start wearing a mask in the home, particularly in shared spaces where appropriate distancing is not possible  Take Care of Yourself:  Try to sleep on your stomach as much as possible  If you have the ability to, take vitamin D3 2000 IU by mouth daily, vitamin-C 1000 mg by mouth twice a day, a multivitamin daily to help boost your immune system  You should check your temperature twice day  Return to the emergency department for any severe shortness of breath or pulse ox less than 90%  If You Test Positive for COVID-19 (Isolate)     Everyone, regardless of vaccination status:     · Stay home for 5 days  · If you have no symptoms or your symptoms are resolving after 5 days, you can leave your house  · Continue to wear a mask around others for 5 additional days  If you have a fever, continue to stay home until your fever resolves  If You Were Exposed to Someone with COVID-19 (Quarantine)  If you:  Have been boosted  OR  Completed the primary series of Pfizer or Moderna vaccine within the last 6 months  OR  Completed the primary series of J&J vaccine within the last 2 months     · Wear a mask around others for 10 days  · Test on day 5, if possible  If you develop symptoms get a test and stay home  If you:  Completed the primary series of Pfizer or Moderna vaccine over 6 months ago and are not boosted  OR  Completed the primary series of J&J over 2 months ago and are not boosted  OR  Are unvaccinated     · Stay home for 5 days   After that continue to wear a mask around others for 5 additional days   · If you can’t quarantine you must wear a mask for 10 days  · Test on day 5 if possible  If you develop symptoms get a test and stay home           Follow up with PCP if not improved, if symptoms are worse, go to the ER

## 2022-10-13 NOTE — TELEPHONE ENCOUNTER
Patient called and left VM to let us know that she tested positive for COVID today  Looks like she had a telemed visit with a [address] today relating to this already    tried to call patient back to verify, left vm

## 2022-10-17 ENCOUNTER — AMB VIDEO VISIT (OUTPATIENT)
Dept: OTHER | Facility: HOSPITAL | Age: 57
End: 2022-10-17
Payer: COMMERCIAL

## 2022-10-17 VITALS — RESPIRATION RATE: 16 BRPM | TEMPERATURE: 97.6 F

## 2022-10-17 DIAGNOSIS — J01.90 ACUTE SINUSITIS, RECURRENCE NOT SPECIFIED, UNSPECIFIED LOCATION: Primary | ICD-10-CM

## 2022-10-17 PROCEDURE — 99212 OFFICE O/P EST SF 10 MIN: CPT | Performed by: NURSE PRACTITIONER

## 2022-10-17 RX ORDER — DOXYCYCLINE 100 MG/1
100 TABLET ORAL 2 TIMES DAILY
Qty: 14 TABLET | Refills: 0 | Status: SHIPPED | OUTPATIENT
Start: 2022-10-17 | End: 2022-10-24

## 2022-10-17 NOTE — LETTER
October 17, 2022     Patient: Viktoriya Carter   YOB: 1965   Date of Visit: 10/17/2022       To Whom It May Concern: It is my medical opinion that Viktoriya Carter may return to work on on 10/19/2022, she tested positive for covid  If you have any questions or concerns, please don't hesitate to call           Sincerely,        KEIRA Raza    CC: No Recipients

## 2022-10-17 NOTE — PATIENT INSTRUCTIONS
Past the window to consider paxlovid but there are some medications that would interact even if this was option  At this point, will treat for sinus infection  Rest and drink extra fluids  Nasal saline flushes or gennaro pot  Follow up with PCP if no improvement  Go to ER with any worsening symptoms

## 2022-10-17 NOTE — CARE ANYWHERE EVISITS
Visit Summary for Cornelio Renteria - Gender: Female - Date of Birth: [id number]  Date: [id number] - Duration: 9 minutes  Patient: Cornelio Renteria  Provider: Kiarra CROCKETT    Patient Contact Information  Address  [address]; [address] [place] [address]  [phone]    Visit Topics    Triage Questions   What is your current physical address in the event of a medical emergency? Answer []  Are you allergic to any medications? Answer []  Are you now or could you be pregnant? Answer []  Do you have any immune system compromise or chronic lung   disease? Answer []  Do you have any vulnerable family members in the home (infant, pregnant, cancer, elderly)? Answer []     Conversation Transcripts  [0A][0A] [Notification] You are connected with Tay Ferguson, Urgent Care Specialist [0A][Notification] Mk Dawkins is located in [place]  [0A][Notification] Mk Dawkins has shared health history  Jo Block  [0A]    Diagnosis  Acute pansinusitis  COVID-19    Procedures  Value: [id number] Code: CPT-4 UNLISTED E&M SERVICE    Medications Prescribed    No prescriptions ordered    Electronically signed by: Tay Gilmore(NPI [id number])

## 2022-10-17 NOTE — PROGRESS NOTES
Video Visit - Cedric Persaud 62 y o  female MRN: [id number]    REQUIRED DOCUMENTATION:         1  This service was provided via [facility]  2  Provider located at [address] [place] [phone]  3  [facility] provider: KEIRA Vazquez  4  Identify all parties in room with patient during [facility] visit:  Patient   5  After connecting through [email or web address], patient was identified by name and date of birth  Patient was then informed that this was a Telemedicine visit and that the exam was being conducted confidentially over secure lines  My office door was closed  No one else was in the room  Patient acknowledged consent and understanding of privacy and security of the Telemedicine visit  I informed the patient that I have reviewed their record in Epic and presented the opportunity for them to ask any questions regarding the visit today  The patient agreed to participate  This is a 62year old female here today for video visit  She was seen on 10/13/2022 after testing positive for covid  She states over all congestion and sinus pressure is a little better  She states she is still very fatigued and tired     She states she feels slightly winded but not wheezing or significantly sob  No chest pain  She states the last time she had fever was 2 days ago  She is eating and drinking okay  Cough from post nasal drip but no other cough  She is now past the window for paxlovid  She states she has history of sinus infection and allergies in the past      Review of Systems   Constitutional: Positive for fatigue  Negative for activity change, chills and fever  HENT: Positive for congestion, rhinorrhea, sinus pressure and sinus pain  Respiratory: Positive for cough  Negative for shortness of breath and wheezing  Cardiovascular: Negative  Musculoskeletal: Negative  Skin: Negative  Neurological: Negative  Psychiatric/Behavioral: Negative        Vitals:    10/17/22 1800 Resp: 16   Temp: 97 6 °F (36 4 °C)     Physical Exam  Constitutional:       General: She is not in acute distress  Appearance: Normal appearance  She is not ill-appearing or toxic-appearing  HENT:      Head: Normocephalic and atraumatic  Nose: Congestion present  Eyes:      Conjunctiva/sclera: Conjunctivae normal    Pulmonary:      Effort: Pulmonary effort is normal  No respiratory distress  Comments: No cough or audible wheezing   Able to speak in full sentences  Skin:     Comments: No rash on head or neck  Neurological:      Mental Status: She is alert and oriented to person, place, and time  Psychiatric:         Mood and Affect: Mood normal          Behavior: Behavior normal          Thought Content: Thought content normal          Judgment: Judgment normal        Diagnoses and all orders for this visit:    Acute sinusitis, recurrence not specified, unspecified location  -     doxycycline (ADOXA) 100 MG tablet; Take 1 tablet (100 mg total) by mouth 2 (two) times a day for 7 days      Patient Instructions   Past the window to consider paxlovid but there are some medications that would interact even if this was option  At this point, will treat for sinus infection  Rest and drink extra fluids  Nasal saline flushes or gennaro pot  Follow up with PCP if no improvement  Go to ER with any worsening symptoms  Follow up with PCP if not improved, if symptoms are worse, go to the ER

## 2022-11-04 ENCOUNTER — OFFICE VISIT (OUTPATIENT)
Dept: ENDOCRINOLOGY | Facility: CLINIC | Age: 57
End: 2022-11-04

## 2022-11-04 VITALS
SYSTOLIC BLOOD PRESSURE: 132 MMHG | HEART RATE: 100 BPM | HEIGHT: 63 IN | WEIGHT: 221 LBS | DIASTOLIC BLOOD PRESSURE: 82 MMHG | BODY MASS INDEX: 39.16 KG/M2

## 2022-11-04 DIAGNOSIS — E11.9 TYPE 2 DIABETES MELLITUS WITHOUT COMPLICATION, WITHOUT LONG-TERM CURRENT USE OF INSULIN (HCC): ICD-10-CM

## 2022-11-04 DIAGNOSIS — I10 PRIMARY HYPERTENSION: ICD-10-CM

## 2022-11-04 DIAGNOSIS — C73 THYROID CANCER (HCC): Primary | ICD-10-CM

## 2022-11-04 DIAGNOSIS — E89.0 POSTOPERATIVE HYPOTHYROIDISM: ICD-10-CM

## 2022-11-04 DIAGNOSIS — E78.2 MIXED HYPERLIPIDEMIA: ICD-10-CM

## 2022-11-04 NOTE — PROGRESS NOTES
Samantha Galvan 62 y o  female MRN: [id number]    Encounter: [id number]      Assessment/Plan     1  Thyroid cancer - no evidence of disease based on last labs and imaging  Will request pathology report & recent notes/labs/imaging from prior endocrinologist office  Reviewed thyroid cancer monitoring strategy  Will continue current dosing of levothyroxine 150 mcg daily with plans to repeat thyroid labs including markers plus baseline neck US in 6-mo    2  Post-operative hypothyroidism - TSH goal 0 5 - 2  Continue current treatment with levothyroxine    3  Diabetes - well controlled on farxiga monotherapy  Continue current treatment plan  Ernst Juarez is up to date on her screening examinations  4  Hyperlipidemia - improving  Continue lipitor 80 mg daily    5  Hypertension - stable  Continue HCTZ 12 5 mg daily, lisinopril 40 mg daily    Problem List Items Addressed This Visit        Endocrine    Postoperative hypothyroidism    Type 2 diabetes mellitus without complication, without long-term current use of insulin (HCC)    Relevant Orders    Basic metabolic panel Lab Collect    HEMOGLOBIN A1C W/ EAG ESTIMATION Lab Collect    Lipid panel Lab Collect Lab Collect    Microalbumin / creatinine urine ratio Lab Collect       Cardiovascular and Mediastinum    Hypertension       Other    Mixed hyperlipidemia      Other Visit Diagnoses     Thyroid cancer ([facility] [address] )    -  Primary    Relevant Orders    T4, free Lab Collect    TSH, 3rd generation Lab Collect    Thyroglobulin w/ab Lab Collect    US head neck lymph node mapping        RTC 6-mo with labs    CC: Thyroid cancer, post-operative hypothyroidism, DM    History of Present Illness     HPI:    Ernst Juarez presents for evaluation of thyroid cancer & DM  Thyroid cancer diagnosed in 2016  Patient had been following small thyroid nodule for several years, ultimately underwent biopsy which revealed cancer   Treatment included marjorie- followed by completion thyroidectomy without SILVA, according to patient preferences  Pathology of thyroid cancer uncertain  No history of recurrence  Patient clinically free of disease and is tolerating levothyroxine 150 mcg daily  She has been on this dosing since March when it was increased from 125 mcg daily dosing  She has no hyperthyroid or hypothyroid symptoms, aside from occasional fatigue and changes in mood  She denies any neck complaints  There is no other family history of thyroid cancer, but hypothyroidism is in family  [place] Be[place] has no history of radiation exposure to neck  Her last US was from 6/2021  [place] Be[place] reports history of prediabetes/DM  She reports weight loss led to remission  She was on metformin but was intolerant of this therapy due to GI symptoms  She takes farxiga 10 mg daily without any adverse effects  She does not monitor SMBG  She has no hyperglycemic symptoms  She has completed MNT counseling in the past  She is up to date on her eye examination; she goes to [facility]  She has no history of kidney disease or heart disease  She is adopted but recently has learned of biological family  She reports T2dm in father and sister, but no heart disease  Review of Systems   Constitutional: Positive for diaphoresis (sometimes) and fatigue  HENT: Negative for trouble swallowing and voice change  Eyes: Negative for visual disturbance  Respiratory: Negative for shortness of breath  Cardiovascular: Negative for palpitations  Gastrointestinal: Negative for diarrhea, nausea and vomiting  Endocrine: Negative for polydipsia, polyphagia and polyuria  Neurological: Negative for tremors  Psychiatric/Behavioral: The patient is not nervous/anxious  All other systems reviewed and are negative        Historical Information   Past Medical History:   Diagnosis Date   • Anxiety    • High cholesterol    • Hypertension    • Hypothyroid    • Thyroid cancer ([facility] [address] ) 2016     Past Surgical History:   Procedure Laterality Date   • BACK SURGERY     • BREAST EXCISIONAL BIOPSY Bilateral     benign - over 35 years   • CARPAL TUNNEL RELEASE Right    • ENDOMETRIAL ABLATION     • HYSTERECTOMY      age- 28   • LAMINECTOMY       Social History   Social History     Substance and Sexual Activity   Alcohol Use Yes   • Alcohol/week: 1 0 standard drink   • Types: 1 Standard drinks or equivalent per week     Social History     Substance and Sexual Activity   Drug Use Never     Social History     Tobacco Use   Smoking Status Former Smoker   Smokeless Tobacco Never Used   Tobacco Comment    QUIT 2010     Family History:   Family History   Problem Relation Age of Onset   • No Known Problems Mother    • No Known Problems Father    • No Known Problems Sister    • Liver cancer Sister    • No Known Problems Maternal Grandmother    • No Known Problems Maternal Grandfather    • No Known Problems Paternal Grandmother    • No Known Problems Paternal Grandfather    • No Known Problems Maternal Aunt    • No Known Problems Maternal Aunt    • No Known Problems Maternal Aunt    • No Known Problems Maternal Aunt    • No Known Problems Maternal Aunt        Meds/Allergies   Current Outpatient Medications   Medication Sig Dispense Refill   • atorvastatin (LIPITOR) 80 mg tablet Take 1 tablet (80 mg total) by mouth daily 90 tablet 1   • Calcium Carb-Cholecalciferol (Calcium/Vitamin D) 600-400 MG-UNIT TABS Take 1 tablet by mouth 2 (two) times a day     • dicyclomine (BENTYL) 10 mg capsule Take 10 mg by mouth 4 (four) times a day (before meals and at bedtime)     • diphenhydrAMINE (BENADRYL) 25 mg tablet      • Farxiga 10 MG TABS Take 10 mg by mouth daily     • fexofenadine (ALLEGRA) 180 MG tablet Take 180 mg by mouth daily PRN     • fluticasone (FLONASE) 50 mcg/act nasal spray      • hydrochlorothiazide (HYDRODIURIL) 25 mg tablet Take 12 5 mg by mouth daily     • hydrOXYzine HCL (ATARAX) 10 mg tablet Take 1 tablet (10 mg total) by mouth every 6 (six) hours as needed for anxiety 30 tablet 1   • levothyroxine 150 mcg tablet Take 150 mcg by mouth daily     • lisinopril (ZESTRIL) 40 mg tablet Take 40 mg by mouth     • Magnesium 400 MG CAPS Take 1 each by mouth daily     • Misc Natural Products (FIBER SUPREME PO) Take 1 Scoop by mouth daily     • Omega-3 Fatty Acids (Fish Oil) 1200 MG CAPS Take 1 capsule by mouth in the morning     • omeprazole (PriLOSEC) 20 mg delayed release capsule Take 20 mg by mouth daily     • Probiotic Product (CULTURELLE PROBIOTICS PO) Take 1 each by mouth daily     • sertraline (ZOLOFT) 100 mg tablet TAKE 2 TABLETS BY MOUTH EVERY  tablet 1   • Vitamin B Complex-C CAPS Take 1 each by mouth in the morning     • Collagen-Vitamin C (SUPER COLLAGEN PLUS VITAMIN C PO) Take 1 each by mouth in the morning       No current facility-administered medications for this visit  Allergies   Allergen Reactions   • Penicillins Anaphylaxis   • Clarithromycin Hives and Other (See Comments)     GI Upset   • Sulfamethoxazole-Trimethoprim Diarrhea       Objective   Vitals: Blood pressure 132/82, pulse 100, height 5' 3" (1 6 m), weight 100 kg (221 lb)  Physical Exam  Vitals reviewed  Constitutional:       Appearance: Normal appearance  HENT:      Head: Normocephalic and atraumatic  Nose: Nose normal    Eyes:      General: No scleral icterus  Conjunctiva/sclera: Conjunctivae normal    Neck:      Thyroid: No thyroid mass  Cardiovascular:      Rate and Rhythm: Normal rate and regular rhythm  Pulses: no weak pulses          Dorsalis pedis pulses are 2+ on the right side and 2+ on the left side  Pulmonary:      Effort: Pulmonary effort is normal  No respiratory distress  Abdominal:      Palpations: Abdomen is soft  Feet:      Right foot:      Skin integrity: No ulcer, skin breakdown, erythema, warmth, callus or dry skin  Left foot:      Skin integrity: No ulcer, skin breakdown, erythema, warmth, callus or dry skin     Lymphadenopathy:      Cervical: No cervical adenopathy  Skin:     General: Skin is warm and dry  Neurological:      General: No focal deficit present  Mental Status: She is alert  Psychiatric:         Mood and Affect: Mood normal          Behavior: Behavior normal      Diabetic Foot Exam    Patient's shoes and socks removed  Right Foot/Ankle   Right Foot Inspection  Skin Exam: skin normal and skin intact  No dry skin, no warmth, no callus, no erythema, no maceration, no abnormal color, no pre-ulcer, no ulcer and no callus  Sensory   Vibration: intact  Monofilament testing: intact    Vascular  The right DP pulse is 2+  Left Foot/Ankle  Left Foot Inspection  Skin Exam: skin normal and skin intact  No dry skin, no warmth, no erythema, no maceration, normal color, no pre-ulcer, no ulcer and no callus  Sensory   Vibration: intact  Monofilament testing: intact    Vascular  The left DP pulse is 2+  Assign Risk Category  No deformity present  No loss of protective sensation  No weak pulses  Risk: 0      The history was obtained from the review of the chart, patient      Lab Results:       Component      Latest Ref Rng & Units 9/24/2022 9/24/2022 9/24/2022           8:57 AM  8:57 AM  8:57 AM   Cholesterol      <200 mg/dL 191     HDL      > OR = 50 mg/dL 63     Triglycerides      <150 mg/dL 189 (H)     LDL Calculated      mg/dL (calc) 100 (H)     Chol HDLC Ratio      <5 0 (calc) 3 0     Non-HDL Cholesterol      <130 mg/dL (calc) 128     Hemoglobin A1C      <5 7 % of total Hgb  5 9 (H)    eAG, EST AVG Glucose      mg/dL  123 6 8   TSH, POC      0 40 - 4 50 mIU/L 1 18        Ref Range & Units 6/4/22  7:38 AM   Thyroglobulin Antibody < or = 1 IU/mL 2 High        Ref Range & Units 6/4/22  7:38 AM   Thyroid Peroxidase Antibody (TPO) <9 IU/mL <1       Ref Range & Units 6/4/22  7:38 AM   TSH 0 4 - 4 5 mIU/L 4 22          Imaging Studies:      6/25/2021    Yonny Hernandez MD - 06/25/2021   Formatting of this note might be different from the original    ULTRASOUND OF THE THYROID BED     HISTORY: History of thyroid cancer, status post thyroidectomy  COMPARISON: Ultrasound dated 7/15/2020     COMMENTS: Ultrasound of the thyroid bed is performed in multiple planes using gray scale and color imaging technique  The thyroid gland is surgically absent  There is no suspicious mass or fluid collection at the surgical bed  There is a right level 3 lymph node measuring 1 1 x 0 9 x 0 4 cm, and the left level 2 lymph node measuring 1 4 x 0 6 x 0 5 cm  There is a small hypoechoic nodule versus lymph node anteriorly to the left thyroid bed measuring 0 5 x 0 5 x 0 2 cm  Findings are stable compared to prior study  IMPRESSION:   Stable small lymph nodes  No suspicious mass or lymphadenopathy  I have personally reviewed pertinent reports  Portions of the record may have been created with voice recognition software  Occasional wrong word or "sound a like" substitutions may have occurred due to the inherent limitations of voice recognition software  Read the chart carefully and recognize, using context, where substitutions have occurred

## 2022-11-07 ENCOUNTER — TELEPHONE (OUTPATIENT)
Dept: ENDOCRINOLOGY | Facility: CLINIC | Age: 57
End: 2022-11-07

## 2022-11-07 ENCOUNTER — TELEPHONE (OUTPATIENT)
Dept: ADMINISTRATIVE | Facility: OTHER | Age: 57
End: 2022-11-07

## 2022-11-07 NOTE — TELEPHONE ENCOUNTER
Left message at dr office will all the information will wait for them to call back to see if they sent her notes or when they will be sending them

## 2022-11-07 NOTE — TELEPHONE ENCOUNTER
----- Message from Iain Cuevas sent at 11/4/2022 12:14 PM EDT -----  Regarding: EYE EXAM  CAN WE PLEASE GET PTS LAST Shobha [address] [place] [address] [facility]   [facility] [phone]

## 2022-11-08 NOTE — TELEPHONE ENCOUNTER
Upon review of the In Basket request we were able to note that no further action is required  The patient chart is up to date as a result of a previous request       Any additional questions or concerns should be emailed to the Practice Liaisons via the appropriate education email address, please do not reply via In Basket      Thank you  Daniel Garland

## 2022-11-23 DIAGNOSIS — E78.2 MIXED HYPERLIPIDEMIA: ICD-10-CM

## 2022-11-23 RX ORDER — ATORVASTATIN CALCIUM 80 MG/1
TABLET, FILM COATED ORAL
Qty: 90 TABLET | Refills: 1 | Status: SHIPPED | OUTPATIENT
Start: 2022-11-23

## 2022-12-04 ENCOUNTER — AMB VIDEO VISIT (OUTPATIENT)
Dept: OTHER | Facility: HOSPITAL | Age: 57
End: 2022-12-04

## 2022-12-04 DIAGNOSIS — J01.90 ACUTE BACTERIAL SINUSITIS: Primary | ICD-10-CM

## 2022-12-04 DIAGNOSIS — B96.89 ACUTE BACTERIAL SINUSITIS: Primary | ICD-10-CM

## 2022-12-04 RX ORDER — LEVOFLOXACIN 750 MG/1
750 TABLET ORAL EVERY 24 HOURS
Qty: 5 TABLET | Refills: 0 | Status: SHIPPED | OUTPATIENT
Start: 2022-12-04 | End: 2022-12-09

## 2022-12-04 NOTE — CARE ANYWHERE EVISITS
Visit Summary for [facility]   FABIÁN - Gender: Female - Date of Birth: [id number]  Date: [id number] - Duration: 9 minutes  Patient: [facility]   FABIÁN  Provider: Juana Giles PA-C    Patient Contact Information  Address  [address]; [address] [place], [place] [address]  [phone]    Visit Topics  Cough, low grade fever [Added By: Self - 2022-12-04]    Triage Questions   What is your current physical address in the event of a medical emergency? Answer []  Are you allergic to any medications? Answer []  Are you now or could you be pregnant? Answer []  Do you have any immune system compromise or chronic lung   disease? Answer []  Do you have any vulnerable family members in the home (infant, pregnant, cancer, elderly)? Answer []     Conversation Transcripts  [0A][0A] [Notification] You are connected with Juana Giles PA-C, Urgent Care Specialist [0A][Notification] Bert Diaz is located in [place]  [0A][Notification] Bert Diaz has shared health history  Dilcia Grewal  [0A]    Diagnosis  Acute sinusitis, unspecified    Procedures  Value: [id number] Code: CPT-4 UNLISTED E&M SERVICE    Medications Prescribed    No prescriptions ordered    Electronically signed by: Luda Jarvis(NPI [id number])

## 2022-12-04 NOTE — PATIENT INSTRUCTIONS
Take antibiotic as directed with food  Recommend probiotics for side effects  Continue with over-the-counter symptom relief  Monitor for worsening symptoms     Rhinosinusitis   AMBULATORY CARE:   Rhinosinusitis (RS)  is inflammation or infection of your nasal passages and sinuses  RS is most often caused by a virus but may be caused by bacteria  Acute RS lasts up to 12 weeks  Chronic RS lasts more than 12 weeks  Recurrent RS means you have 4 or more infections in 1 year  Common signs and symptoms:   Fever    Pain, pressure, redness, or swelling around the forehead, cheeks, or eyes    Thick yellow or green discharge from your nose    Loss of smell or taste    Dry cough that happens mostly at night or when you lie down    Headache and face pain that is worse when you lean forward    Tooth pain, or pain when you chew    Seek care immediately if:   You have trouble breathing, or wheezing that is getting worse  You have a stiff neck, a fever, or a bad headache  You cannot open your eye  Your eyeball bulges out, or you cannot move your eye  You are more sleepy than usual, or you notice changes in your ability to think, move, or talk  You have swelling of your forehead or scalp  Call your doctor if:   Your symptoms are worse or do not improve after 3 to 5 days of treatment  You have questions or concerns about your condition or care  Treatment  may not be needed  Your symptoms may go away on their own  Your healthcare provider may recommend watchful waiting for up to 10 days before starting antibiotics  Antibiotics will not help if the infection is caused by a virus  Check with your provider before you take any over-the-counter medicines  You may need any of the following:  Acetaminophen  decreases pain and fever  It is available without a doctor's order  Ask how much to take and how often to take it  Follow directions   Read the labels of all other medicines you are using to see if they also contain acetaminophen, or ask your doctor or pharmacist  Acetaminophen can cause liver damage if not taken correctly  Do not use more than 4 grams (4,000 milligrams) total of acetaminophen in one day  NSAIDs , such as ibuprofen, help decrease swelling, pain, and fever  This medicine is available with or without a doctor's order  NSAIDs can cause stomach bleeding or kidney problems in certain people  If you take blood thinner medicine, always ask your healthcare provider if NSAIDs are safe for you  Always read the medicine label and follow directions  Nasal steroid sprays  help decrease inflammation in your nose and sinuses  Decongestants  help reduce swelling and drain mucus in the nose and sinuses  They may help you breathe easier  Do not use decongestants for more than 3 days  Antihistamines  help dry mucus in the nose and relieve sneezing  Antibiotics  help treat or prevent a bacterial infection  Self-care:   Rinse your sinuses as directed  Use a sinus rinse device to rinse your nasal passages with a saline (salt water) solution or distilled water  Do not  use tap water  A sinus rinse will help thin the mucus in your nose and rinse away pollen and dirt  It will also help lower swelling so you can breathe normally  Use a humidifier  to increase air moisture in your home  Moist air may make it easier for you to breathe and help decrease your cough  Sleep with your head raised  Place an extra pillow under your head before you go to sleep to help your sinuses drain  Drink liquids as directed  Ask your healthcare provider how much liquid to drink each day and which liquids are best for you  Liquids will thin the mucus in your nose and help it drain  Do not have drinks that contain alcohol or caffeine  Do not smoke, and avoid secondhand smoke  Nicotine and other chemicals in cigarettes and cigars can make your symptoms worse   Ask your healthcare provider for information if you currently smoke and need help to quit  E-cigarettes or smokeless tobacco still contain nicotine  Talk to your healthcare provider before you use these products  Prevent the spread of germs:   Wash your hands often with soap and water  Wash your hands after you use the bathroom, change a child's diaper, or sneeze  Wash your hands before you prepare or eat food  Stay away from people who are sick  Some germs spread easily and quickly through contact  Follow up with your doctor as directed: You may be referred to an ear, nose, and throat specialist  Write down your questions so you remember to ask them during your visits  © Copyright [employer] 2022 Information is for End User's use only and may not be sold, redistributed or otherwise used for commercial purposes  All illustrations and images included in CareNotes® are the copyrighted property of [employer]  or Naresh Tolbert  The above information is an  only  It is not intended as medical advice for individual conditions or treatments  Talk to your doctor, nurse or pharmacist before following any medical regimen to see if it is safe and effective for you

## 2022-12-04 NOTE — PROGRESS NOTES
Video Visit - [facility] 62 y o  female MRN: [id number]    REQUIRED DOCUMENTATION:     [facility] [address]    1  This service was provided via [facility]  2  Provider located at [address] [place] [id number] [phone]  3  [facility] provider: Karin Waite PA-C   4  Identify all parties in room with patient during [facility] visit:  Patient alone  5  After connecting through [employer], patient was identified by name and date of birth  Patient was then informed that this was a Telemedicine visit and that the exam was being conducted confidentially over secure lines  My office door was closed  No one else was in the room  Patient acknowledged consent and understanding of privacy and security of the Telemedicine visit  I informed the patient that I have reviewed their record in Epic and presented the opportunity for them to ask any questions regarding the visit today  The patient agreed to participate  Patient w/ PMH significant for DMII, HTN, HLD, and allergic rhinitis presents w/ c/o congestion since Tuesday  She reports that she had PND and a cough  She notes a Tmax of 100 2 today  She reports taking advil, flonase, and mucinex  She reports testing negative for COVID19  Pt denies dyspnea, SOB, sore throat, ear pain, abdominal pain, nausea, vomiting, and diarrhea  Pt notes that she quit smoking 13 yrs ago  Review of Systems   Constitutional: Negative for chills, diaphoresis and fever  HENT: Positive for congestion, postnasal drip and sinus pressure  Negative for ear pain and sore throat  Eyes: Negative for pain, discharge and visual disturbance  Respiratory: Positive for cough  Negative for chest tightness and shortness of breath  Cardiovascular: Negative for chest pain and palpitations  Gastrointestinal: Negative for abdominal pain, diarrhea, nausea and vomiting  Genitourinary: Negative for dysuria and hematuria     Musculoskeletal: Negative for arthralgias, back pain and myalgias  Skin: Negative for color change and rash  Neurological: Negative for seizures, syncope and headaches  All other systems reviewed and are negative  Physical Exam  Vitals and nursing note reviewed  Constitutional:       General: She is not in acute distress  Appearance: Normal appearance  She is well-developed  She is not ill-appearing or diaphoretic  HENT:      Head: Normocephalic and atraumatic  Right Ear: External ear normal       Left Ear: External ear normal       Nose: Congestion present  Mouth/Throat:      Mouth: Mucous membranes are moist       Pharynx: Oropharynx is clear  Eyes:      General:         Right eye: No discharge  Left eye: No discharge  Conjunctiva/sclera: Conjunctivae normal    Pulmonary:      Effort: Pulmonary effort is normal  No respiratory distress  Breath sounds: Normal breath sounds  Musculoskeletal:      Cervical back: Neck supple  Skin:     General: Skin is warm and dry  Capillary Refill: Capillary refill takes less than 2 seconds  Findings: No rash  Comments: No skin changes of face & neck   Neurological:      Mental Status: She is alert and oriented to person, place, and time  Psychiatric:         Mood and Affect: Mood normal          Thought Content: Thought content normal        Diagnoses and all orders for this visit:    Acute bacterial sinusitis  -     levofloxacin (LEVAQUIN) 750 mg tablet; Take 1 tablet (750 mg total) by mouth every 24 hours for 5 days    Pt recently took doxycycline for similar issue  Patient Instructions     Take antibiotic as directed with food  Recommend probiotics for side effects  Continue with over-the-counter symptom relief  Monitor for worsening symptoms     Rhinosinusitis   AMBULATORY CARE:   Rhinosinusitis (RS)  is inflammation or infection of your nasal passages and sinuses  RS is most often caused by a virus but may be caused by bacteria   Acute RS lasts up to 12 weeks  Chronic RS lasts more than 12 weeks  Recurrent RS means you have 4 or more infections in 1 year  Common signs and symptoms:   · Fever    · Pain, pressure, redness, or swelling around the forehead, cheeks, or eyes    · Thick yellow or green discharge from your nose    · Loss of smell or taste    · Dry cough that happens mostly at night or when you lie down    · Headache and face pain that is worse when you lean forward    · Tooth pain, or pain when you chew    Seek care immediately if:   · You have trouble breathing, or wheezing that is getting worse  · You have a stiff neck, a fever, or a bad headache  · You cannot open your eye  · Your eyeball bulges out, or you cannot move your eye  · You are more sleepy than usual, or you notice changes in your ability to think, move, or talk  · You have swelling of your forehead or scalp  Call your doctor if:   · Your symptoms are worse or do not improve after 3 to 5 days of treatment  · You have questions or concerns about your condition or care  Treatment  may not be needed  Your symptoms may go away on their own  Your healthcare provider may recommend watchful waiting for up to 10 days before starting antibiotics  Antibiotics will not help if the infection is caused by a virus  Check with your provider before you take any over-the-counter medicines  You may need any of the following:  · Acetaminophen  decreases pain and fever  It is available without a doctor's order  Ask how much to take and how often to take it  Follow directions  Read the labels of all other medicines you are using to see if they also contain acetaminophen, or ask your doctor or pharmacist  Acetaminophen can cause liver damage if not taken correctly  Do not use more than 4 grams (4,000 milligrams) total of acetaminophen in one day  · NSAIDs , such as ibuprofen, help decrease swelling, pain, and fever   This medicine is available with or without a doctor's order  NSAIDs can cause stomach bleeding or kidney problems in certain people  If you take blood thinner medicine, always ask your healthcare provider if NSAIDs are safe for you  Always read the medicine label and follow directions  · Nasal steroid sprays  help decrease inflammation in your nose and sinuses  · Decongestants  help reduce swelling and drain mucus in the nose and sinuses  They may help you breathe easier  Do not use decongestants for more than 3 days  · Antihistamines  help dry mucus in the nose and relieve sneezing  · Antibiotics  help treat or prevent a bacterial infection  Self-care:   · Rinse your sinuses as directed  Use a sinus rinse device to rinse your nasal passages with a saline (salt water) solution or distilled water  Do not  use tap water  A sinus rinse will help thin the mucus in your nose and rinse away pollen and dirt  It will also help lower swelling so you can breathe normally  · Use a humidifier  to increase air moisture in your home  Moist air may make it easier for you to breathe and help decrease your cough  · Sleep with your head raised  Place an extra pillow under your head before you go to sleep to help your sinuses drain  · Drink liquids as directed  Ask your healthcare provider how much liquid to drink each day and which liquids are best for you  Liquids will thin the mucus in your nose and help it drain  Do not have drinks that contain alcohol or caffeine  · Do not smoke, and avoid secondhand smoke  Nicotine and other chemicals in cigarettes and cigars can make your symptoms worse  Ask your healthcare provider for information if you currently smoke and need help to quit  E-cigarettes or smokeless tobacco still contain nicotine  Talk to your healthcare provider before you use these products  Prevent the spread of germs:   · Wash your hands often with soap and water    Wash your hands after you use the bathroom, change a child's diaper, or sneeze  Wash your hands before you prepare or eat food  · Stay away from people who are sick  Some germs spread easily and quickly through contact  Follow up with your doctor as directed: You may be referred to an ear, nose, and throat specialist  Write down your questions so you remember to ask them during your visits  © [employer] 2022 Information is for End User's use only and may not be sold, redistributed or otherwise used for commercial purposes  All illustrations and images included in CareNotes® are the copyrighted property of [employer]  or [facility] [employer]   The above information is an  only  It is not intended as medical advice for individual conditions or treatments  Talk to your doctor, nurse or pharmacist before following any medical regimen to see if it is safe and effective for you  Follow up with PCP if not improved, if symptoms are worse, go to the ER

## 2023-01-21 LAB
CREAT ?TM UR-SCNC: 104 UMOL/L
EXT MICROALBUMIN URINE RANDOM: 1.9
HBA1C MFR BLD HPLC: 6 %
MICROALBUMIN/CREAT UR: 18 MG/G{CREAT}

## 2023-01-21 PROCEDURE — 3044F HG A1C LEVEL LT 7.0%: CPT | Performed by: NURSE PRACTITIONER

## 2023-01-25 DIAGNOSIS — C73 THYROID CANCER (HCC): Primary | ICD-10-CM

## 2023-01-25 DIAGNOSIS — E11.9 TYPE 2 DIABETES MELLITUS WITHOUT COMPLICATION, WITHOUT LONG-TERM CURRENT USE OF INSULIN (HCC): ICD-10-CM

## 2023-01-27 ENCOUNTER — OFFICE VISIT (OUTPATIENT)
Dept: FAMILY MEDICINE CLINIC | Facility: CLINIC | Age: 58
End: 2023-01-27

## 2023-01-27 VITALS
BODY MASS INDEX: 40.43 KG/M2 | DIASTOLIC BLOOD PRESSURE: 82 MMHG | WEIGHT: 228.2 LBS | TEMPERATURE: 97.6 F | OXYGEN SATURATION: 94 % | HEART RATE: 94 BPM | HEIGHT: 63 IN | SYSTOLIC BLOOD PRESSURE: 136 MMHG

## 2023-01-27 DIAGNOSIS — E78.2 MIXED HYPERLIPIDEMIA: ICD-10-CM

## 2023-01-27 DIAGNOSIS — Z00.00 ANNUAL PHYSICAL EXAM: Primary | ICD-10-CM

## 2023-01-27 DIAGNOSIS — Z12.4 SCREENING FOR CERVICAL CANCER: ICD-10-CM

## 2023-01-27 DIAGNOSIS — K21.9 GASTROESOPHAGEAL REFLUX DISEASE WITHOUT ESOPHAGITIS: ICD-10-CM

## 2023-01-27 DIAGNOSIS — I10 PRIMARY HYPERTENSION: ICD-10-CM

## 2023-01-27 DIAGNOSIS — F41.9 ANXIETY: ICD-10-CM

## 2023-01-27 DIAGNOSIS — E89.0 POSTOPERATIVE HYPOTHYROIDISM: ICD-10-CM

## 2023-01-27 DIAGNOSIS — E66.01 MORBID OBESITY WITH BMI OF 40.0-44.9, ADULT (HCC): ICD-10-CM

## 2023-01-27 DIAGNOSIS — E11.9 TYPE 2 DIABETES MELLITUS WITHOUT COMPLICATION, WITHOUT LONG-TERM CURRENT USE OF INSULIN (HCC): ICD-10-CM

## 2023-01-27 RX ORDER — FEXOFENADINE HCL AND PSEUDOEPHEDRINE HCI 180; 240 MG/1; MG/1
TABLET, EXTENDED RELEASE ORAL
COMMUNITY

## 2023-01-27 RX ORDER — OMEPRAZOLE 20 MG/1
20 CAPSULE, DELAYED RELEASE ORAL DAILY
Qty: 90 CAPSULE | Refills: 1 | Status: SHIPPED | OUTPATIENT
Start: 2023-01-27

## 2023-01-27 RX ORDER — PREDNISOLONE ACETATE 10 MG/ML
SUSPENSION/ DROPS OPHTHALMIC
COMMUNITY
Start: 2023-01-07

## 2023-01-27 RX ORDER — PYRAZINAMIDE 500 MG/1
1 TABLET ORAL EVERY 4 HOURS PRN
COMMUNITY
Start: 2023-01-10

## 2023-01-27 NOTE — PATIENT INSTRUCTIONS

## 2023-01-27 NOTE — PROGRESS NOTES
ADULT ANNUAL [facility] PRIMARY CARE    NAME: Contreras Alvares  AGE: 62 y o  SEX: female  : 1965     DATE: 2023     Assessment and Plan:     Problem List Items Addressed This Visit        Digestive    Gastroesophageal reflux disease without esophagitis    Relevant Medications    omeprazole (PriLOSEC) 20 mg delayed release capsule       Endocrine    Postoperative hypothyroidism    Type 2 diabetes mellitus without complication, without long-term current use of insulin (HCC)       Cardiovascular and Mediastinum    Hypertension       Other    Anxiety    Mixed hyperlipidemia   Other Visit Diagnoses     Annual physical exam    -  Primary    Screening for cervical cancer        Relevant Orders    Ambulatory referral to Obstetrics / Gynecology    Morbid obesity with BMI of 40 0-44 9, adult ([facility] )              Immunizations and preventive care screenings were discussed with patient today  Appropriate education was printed on patient's after visit summary  Counseling:  · Dental Health: discussed importance of regular tooth brushing, flossing, and dental visits  BMI Counseling: Body mass index is 40 42 kg/m²  The BMI is above normal  Nutrition recommendations include encouraging healthy choices of fruits and vegetables  Rationale for BMI follow-up plan is due to patient being overweight or obese  Labs completed by endocrine - she has a follow-up in 3 months - they are monitoring her HA1C at this time  BP in normal parameters today  PPI sent to pharmacy  Return in about 6 months (around 2023) for Next scheduled follow up  Chief Complaint:     Chief Complaint   Patient presents with   • Physical Exam      History of Present Illness:     Adult Annual Physical   Patient here for a comprehensive physical exam  The patient reports no problems  Hx of T2DM - followed by endocrine    Hx of Thyroid CA - had thyroid surgically removed - currently on levothyroxine - monitored by endocrine  Hx of HTN - controlled with dual therapy  Hx of HLD - on a statin  Hx of allergies - on Allegra for management  Diet and Physical Activity  · Diet/Nutrition: well balanced diet  · Exercise: no formal exercise  Depression Screening  PHQ-2/9 Depression Screening    Little interest or pleasure in doing things: 0 - not at all  Feeling down, depressed, or hopeless: 0 - not at all       [address]  · Sleep: sleeps well  · Hearing: normal - bilateral   · Vision: most recent eye exam <1 year ago and wears glasses  · Dental: regular dental visits  /GYN Health  · Patient is: postmenopausal  ·      Review of Systems:     Review of Systems   Constitutional: Negative  HENT: Negative  Respiratory: Negative  Cardiovascular: Negative  Gastrointestinal: Negative  Neurological: Negative  All other systems reviewed and are negative  Past Medical History:     Past Medical History:   Diagnosis Date   • Anxiety    • High cholesterol    • Hypertension    • Hypothyroid    • Thyroid cancer ([facility] [address] ) 2016      Past Surgical History:     Past Surgical History:   Procedure Laterality Date   • BACK SURGERY     • BREAST EXCISIONAL BIOPSY Bilateral     benign - over 35 years   • CARPAL TUNNEL RELEASE Right    • ENDOMETRIAL ABLATION     • HYSTERECTOMY      age- 28   • LAMINECTOMY        Social History:     Social History     Socioeconomic History   • Marital status: /Civil Union     Spouse name: None   • Number of children: None   • Years of education: None   • Highest education level: None   Occupational History   • None   Tobacco Use   • Smoking status: Former   • Smokeless tobacco: Never   • Tobacco comments:     QUIT 2010   Vaping Use   • Vaping Use: Never used   Substance and Sexual Activity   • Alcohol use:  Yes     Alcohol/week: 1 0 standard drink     Types: 1 Standard drinks or equivalent per week   • Drug use: Never   • Sexual activity: Not Currently   Other Topics Concern   • None   Social History Narrative   • None     Social Determinants of Health     Financial Resource Strain: Not on file   Food Insecurity: Not on file   Transportation Needs: Not on file   Physical Activity: Not on file   Stress: Not on file   Social Connections: Not on file   Intimate Partner Violence: Not on file   Housing Stability: Not on file      Family History:     Family History   Problem Relation Age of Onset   • No Known Problems Mother    • No Known Problems Father    • No Known Problems Sister    • Liver cancer Sister    • No Known Problems Maternal Grandmother    • No Known Problems Maternal Grandfather    • No Known Problems Paternal Grandmother    • No Known Problems Paternal Grandfather    • No Known Problems Maternal Aunt    • No Known Problems Maternal Aunt    • No Known Problems Maternal Aunt    • No Known Problems Maternal Aunt    • No Known Problems Maternal Aunt       Current Medications:     Current Outpatient Medications   Medication Sig Dispense Refill   • acetaminophen-codeine (TYLENOL with CODEINE #3) 300-30 MG per tablet Take 1 tablet by mouth every 4 (four) hours as needed     • atorvastatin (LIPITOR) 80 mg tablet TAKE 1 TABLET BY MOUTH EVERY DAY 90 tablet 1   • Calcium Carb-Cholecalciferol (Calcium/Vitamin D) 600-400 MG-UNIT TABS Take 1 tablet by mouth 2 (two) times a day     • dicyclomine (BENTYL) 10 mg capsule Take 10 mg by mouth 4 (four) times a day (before meals and at bedtime)     • diphenhydrAMINE (BENADRYL) 25 mg tablet      • Farxiga 10 MG TABS Take 10 mg by mouth daily     • fexofenadine (ALLEGRA) 180 MG tablet Take 180 mg by mouth daily PRN     • fexofenadine-pseudoephedrine (ALLEGRA-D 24) 180-240 MG per 24 hr tablet Take by mouth     • fluticasone (FLONASE) 50 mcg/act nasal spray      • hydrochlorothiazide (HYDRODIURIL) 25 mg tablet Take 12 5 mg by mouth daily     • hydrOXYzine HCL (ATARAX) 10 mg tablet Take 1 tablet (10 mg total) by mouth every 6 (six) hours as needed for anxiety 30 tablet 1   • levothyroxine 150 mcg tablet Take 150 mcg by mouth daily     • lisinopril (ZESTRIL) 40 mg tablet Take 40 mg by mouth     • Magnesium 400 MG CAPS Take 1 each by mouth daily     • Misc Natural Products (FIBER SUPREME PO) Take 1 Scoop by mouth daily     • Omega-3 Fatty Acids (Fish Oil) 1200 MG CAPS Take 1 capsule by mouth in the morning     • omeprazole (PriLOSEC) 20 mg delayed release capsule Take 1 capsule (20 mg total) by mouth daily 90 capsule 1   • prednisoLONE acetate (PRED FORTE) 1 % ophthalmic suspension USE 1 DROP INTO LEFT EYE EVERY 2 HOURS     • Probiotic Product (CULTURELLE PROBIOTICS PO) Take 1 each by mouth daily     • sertraline (ZOLOFT) 100 mg tablet TAKE 2 TABLETS BY MOUTH EVERY  tablet 1   • Vitamin B Complex-C CAPS Take 1 each by mouth in the morning       No current facility-administered medications for this visit  Allergies: Allergies   Allergen Reactions   • Penicillins Anaphylaxis   • Clarithromycin Hives and Other (See Comments)     GI Upset   • Sulfamethoxazole-Trimethoprim Diarrhea      Physical Exam:     /82 (BP Location: Right arm, Patient Position: Sitting, Cuff Size: Standard)   Pulse 94   Temp 97 6 °F (36 4 °C)   Ht 5' 3" (1 6 m)   Wt 104 kg (228 lb 3 2 oz)   SpO2 94%   BMI 40 42 kg/m²     Physical Exam  Vitals and nursing note reviewed  Constitutional:       General: She is not in acute distress  Appearance: Normal appearance  She is well-developed  HENT:      Head: Normocephalic and atraumatic  Eyes:      Conjunctiva/sclera: Conjunctivae normal    Cardiovascular:      Rate and Rhythm: Normal rate and regular rhythm  Heart sounds: No murmur heard  No gallop  Pulmonary:      Effort: Pulmonary effort is normal  No respiratory distress  Breath sounds: Normal breath sounds  Abdominal:      Palpations: Abdomen is soft  Tenderness:  There is no abdominal tenderness  Musculoskeletal:      Cervical back: Neck supple  Skin:     General: Skin is warm and dry  Neurological:      General: No focal deficit present  Mental Status: She is alert and oriented to person, place, and time  Mental status is at baseline  Psychiatric:         Mood and Affect: Mood normal          Behavior: Behavior normal          Thought Content:  Thought content normal          Judgment: Judgment normal           KEIRA Delcid  [facility] PRIMARY [facility]

## 2023-01-30 ENCOUNTER — TELEPHONE (OUTPATIENT)
Dept: CARDIOLOGY CLINIC | Facility: CLINIC | Age: 58
End: 2023-01-30

## 2023-02-23 LAB
LEFT EYE DIABETIC RETINOPATHY: NORMAL
RIGHT EYE DIABETIC RETINOPATHY: NORMAL
SEVERITY (EYE EXAM): NORMAL

## 2023-03-06 DIAGNOSIS — E11.9 TYPE 2 DIABETES MELLITUS WITHOUT COMPLICATION, WITHOUT LONG-TERM CURRENT USE OF INSULIN (HCC): Primary | ICD-10-CM

## 2023-03-06 RX ORDER — DAPAGLIFLOZIN 10 MG/1
10 TABLET, FILM COATED ORAL DAILY
Qty: 30 TABLET | Refills: 3 | Status: SHIPPED | OUTPATIENT
Start: 2023-03-06

## 2023-03-13 ENCOUNTER — OFFICE VISIT (OUTPATIENT)
Dept: FAMILY MEDICINE CLINIC | Facility: CLINIC | Age: 58
End: 2023-03-13

## 2023-03-13 VITALS
WEIGHT: 228 LBS | OXYGEN SATURATION: 96 % | TEMPERATURE: 97.8 F | SYSTOLIC BLOOD PRESSURE: 142 MMHG | BODY MASS INDEX: 40.4 KG/M2 | HEIGHT: 63 IN | HEART RATE: 90 BPM | DIASTOLIC BLOOD PRESSURE: 67 MMHG

## 2023-03-13 DIAGNOSIS — M25.551 ACUTE PAIN OF BOTH HIPS: Primary | ICD-10-CM

## 2023-03-13 DIAGNOSIS — M25.552 ACUTE PAIN OF BOTH HIPS: Primary | ICD-10-CM

## 2023-03-13 DIAGNOSIS — M25.60 JOINT STIFFNESS: ICD-10-CM

## 2023-03-13 DIAGNOSIS — M25.511 ACUTE PAIN OF BOTH SHOULDERS: ICD-10-CM

## 2023-03-13 DIAGNOSIS — R50.9 FEVER, UNSPECIFIED FEVER CAUSE: ICD-10-CM

## 2023-03-13 DIAGNOSIS — C73 THYROID CANCER (HCC): Primary | ICD-10-CM

## 2023-03-13 DIAGNOSIS — M25.512 ACUTE PAIN OF BOTH SHOULDERS: ICD-10-CM

## 2023-03-13 PROBLEM — G89.29 CHRONIC RIGHT-SIDED LOW BACK PAIN WITHOUT SCIATICA: Status: ACTIVE | Noted: 2021-06-14

## 2023-03-13 PROBLEM — M54.50 CHRONIC RIGHT-SIDED LOW BACK PAIN WITHOUT SCIATICA: Status: ACTIVE | Noted: 2021-06-14

## 2023-03-13 PROBLEM — J30.9 ALLERGIC RHINITIS: Status: ACTIVE | Noted: 2023-03-13

## 2023-03-13 PROBLEM — E89.0 HYPOTHYROIDISM, POSTSURGICAL: Status: ACTIVE | Noted: 2018-02-15

## 2023-03-13 RX ORDER — LEVOTHYROXINE SODIUM 0.15 MG/1
150 TABLET ORAL DAILY
Qty: 90 TABLET | Refills: 0 | Status: SHIPPED | OUTPATIENT
Start: 2023-03-13 | End: 2023-06-11

## 2023-03-13 NOTE — PATIENT INSTRUCTIONS
Arthralgia   WHAT YOU NEED TO KNOW:   Arthralgia is pain in one or more joints, with no inflammation  It may be short-term and get better within 6 to 8 weeks  Arthralgia can be an early sign of arthritis  Arthralgia may be caused by a medical condition, such as a hormone disorder or a tumor  It may also be caused by an infection or injury  DISCHARGE INSTRUCTIONS:   Medicines: The following medicines may  be ordered for you:  Acetaminophen  decreases pain  Ask how much to take and how often to take it  Follow directions  Acetaminophen can cause liver damage if not taken correctly  NSAIDs  decrease pain and prevent swelling  Ask your healthcare provider which medicine is right for you  Ask how much to take and when to take it  Take as directed  NSAIDs can cause stomach bleeding and kidney problems if not taken correctly  Pain relief cream  decreases pain  Use this cream as directed  Take your medicine as directed  Contact your healthcare provider if you think your medicine is not helping or if you have side effects  Tell your provider if you are allergic to any medicine  Keep a list of the medicines, vitamins, and herbs you take  Include the amounts, and when and why you take them  Bring the list or the pill bottles to follow-up visits  Carry your medicine list with you in case of an emergency  Follow up with your healthcare provider or specialist as directed:  Write down your questions so you remember to ask them during your visits  Self-care:   Apply heat  to help decrease pain  Use a heating pad or heat wrap  Apply heat for 20 to 30 minutes every 2 hours for as many days as directed  Rest  as much as possible  Avoid activities that cause joint pain  Apply ice  to help decrease swelling and pain  Ice may also help prevent tissue damage  Use an ice pack, or put crushed ice in a plastic bag   Cover it with a towel and place it on your painful joint for 15 to 20 minutes every hour or as directed  Support  the joint with a brace or elastic wrap as directed  Elevate  your joint above the level of your heart as often as you can to help decrease swelling and pain  Prop your painful joint on pillows or blankets to keep it elevated comfortably  Lose weight  if you are overweight  Extra weight can put pressure on your joints and cause more pain  Ask your healthcare provider how much you should weigh  Ask him to help you create a weight loss plan  Exercise  regularly to help improve joint movement and to decrease pain  Ask about the best exercise plan for you  Low-impact exercises can help take the pressure off your joints  Examples are walking, swimming, and water aerobics  Physical therapy:  A physical therapist teaches you exercises to help improve movement and strength, and to decrease pain  Ask your healthcare provider if physical therapy is right for you  Contact your healthcare provider or specialist if:   You have a fever  You continue to have joint pain that cannot be relieved with heat, ice, or medicine  You have pain and inflammation around your joint  You have questions or concerns about your condition or care  Return to the emergency department if:   You have sudden, severe pain when you move your joint  You have a fever and shaking chills  You cannot move your joint  You lose feeling on the side of your body where you have the painful joint  © Copyright [facility] 2022 Information is for End User's use only and may not be sold, redistributed or otherwise used for commercial purposes  The above information is an  only  It is not intended as medical advice for individual conditions or treatments  Talk to your doctor, nurse or pharmacist before following any medical regimen to see if it is safe and effective for you

## 2023-03-13 NOTE — PROGRESS NOTES
Name: Sweetie Pat      : 1965      MRN: [id number]  Encounter Provider: KEIRA Fernández  Encounter Date: 3/13/2023   Encounter department: [facility]    Assessment & Plan     Patient transitioning to our practice for primary care services    Pain in bilateral knees likely related to osteoarthritis  However new onset/acute pain of the bilateral hips and shoulders warrants further investigation  Physical exam benign today other than mild swelling of her bilateral knees  Patient inquiring if her symptoms are from her statin use, informed her that this is unlikely as she has been on this medication for years and has had no recent dose changes  Due to family history of rheumatic disorders, will order rheumatic panel consisting of ESR, CRP, MARIBEL, rheumatoid factor  Will also check CBC and Lyme antibody profile as the patient was traveling in a warm climate in December of last year  Patient asking if she can continue with as needed ibuprofen use, advised patient to use only as needed due to risk of kidney injury and/or gastrointestinal upset  Will follow-up with the patient in 1 month to discuss labs and to officially establish care  1  Acute pain of both hips  -     Sedimentation rate, automated; Future  -     C-reactive protein; Future  -     MARIBEL Screen w/ Reflex to Titer/Pattern; Future  -     Rheumatoid Factor; Future  -     CBC and differential; Future  -     Lyme Antibody Profile with reflex to WB; Future    2  Acute pain of both shoulders  -     Sedimentation rate, automated; Future  -     C-reactive protein; Future  -     MARIBEL Screen w/ Reflex to Titer/Pattern; Future  -     Rheumatoid Factor; Future  -     CBC and differential; Future  -     Lyme Antibody Profile with reflex to WB; Future    3  Joint stiffness  -     Sedimentation rate, automated; Future  -     C-reactive protein;  Future  -     MARIBEL Screen w/ Reflex to Titer/Pattern; Future  -     Rheumatoid Factor; Future  -     CBC and differential; Future  -     Lyme Antibody Profile with reflex to WB; Future    4  Fever, unspecified fever cause  -     Sedimentation rate, automated; Future  -     C-reactive protein; Future  -     MARIBEL Screen w/ Reflex to Titer/Pattern; Future  -     Rheumatoid Factor; Future  -     CBC and differential; Future  -     Lyme Antibody Profile with reflex to WB; Future         Subjective      Patient presents today for evaluation of generalized joint pain  Patient is switching to our practice for primary care services  Patient had "inflammation of L eye" in January  Also reports intermittent swelling of L hand, also stated in January    - Onset of symptoms/condition: approx 3 weeks ago    progressively worsening over the past few weeks    - Location/Area affected: b/l hip, shoulder, and knee pain    - Duration of problem: worse in the AM hours (lasts 30-60 mins)    - Character/Quality: achiness, at worse 6/10    - Condition aggravated or alleviated with: feels better with activity     - Does the condition radiate elsewhere: started in knees (has had chronic issues), pain in hips and shoulders is "totally new"    - Treatments so far: ibuprofen "helps a bit"    Reports biological father has hx of RA, also biological niece  Recent travel to [place] in end of Dec 2022  No recent med changes    Review of Systems   Constitutional: Positive for fatigue and fever  Fever of unknown origin approx 3 weeks ago   HENT: Negative  Eyes: Negative  Respiratory: Negative  Cardiovascular: Negative  Gastrointestinal: Negative  Endocrine: Negative  Genitourinary: Negative  Musculoskeletal: Positive for arthralgias and myalgias  Skin: Negative  Allergic/Immunologic: Negative  Neurological: Negative  Hematological: Negative  Psychiatric/Behavioral: Negative          Current Outpatient Medications on File Prior to Visit   Medication Sig   • atorvastatin (LIPITOR) 80 mg tablet TAKE 1 TABLET BY MOUTH EVERY DAY   • Calcium Carb-Cholecalciferol (Calcium/Vitamin D) 600-400 MG-UNIT TABS Take 1 tablet by mouth 2 (two) times a day   • dicyclomine (BENTYL) 10 mg capsule Take 10 mg by mouth 4 (four) times a day (before meals and at bedtime)   • diphenhydrAMINE (BENADRYL) 25 mg tablet    • Farxiga 10 MG tablet Take 1 tablet (10 mg total) by mouth daily   • fexofenadine (ALLEGRA) 180 MG tablet Take 180 mg by mouth daily PRN   • fluticasone (FLONASE) 50 mcg/act nasal spray    • hydrochlorothiazide (HYDRODIURIL) 25 mg tablet Take 12 5 mg by mouth daily   • lisinopril (ZESTRIL) 40 mg tablet Take 40 mg by mouth   • Magnesium 400 MG CAPS Take 1 each by mouth daily   • Misc Natural Products (FIBER SUPREME PO) Take 1 Scoop by mouth daily   • Omega-3 Fatty Acids (Fish Oil) 1200 MG CAPS Take 1 capsule by mouth in the morning   • omeprazole (PriLOSEC) 20 mg delayed release capsule Take 1 capsule (20 mg total) by mouth daily   • Probiotic Product (CULTURELLE PROBIOTICS PO) Take 1 each by mouth daily   • sertraline (ZOLOFT) 100 mg tablet TAKE 2 TABLETS BY MOUTH EVERY DAY   • Vitamin B Complex-C CAPS Take 1 each by mouth in the morning   • acetaminophen-codeine (TYLENOL with CODEINE #3) 300-30 MG per tablet Take 1 tablet by mouth every 4 (four) hours as needed (Patient not taking: Reported on 3/13/2023)   • fexofenadine-pseudoephedrine (ALLEGRA-D 24) 180-240 MG per 24 hr tablet Take by mouth (Patient not taking: Reported on 3/13/2023)   • hydrOXYzine HCL (ATARAX) 10 mg tablet Take 1 tablet (10 mg total) by mouth every 6 (six) hours as needed for anxiety (Patient not taking: Reported on 3/13/2023)   • prednisoLONE acetate (PRED FORTE) 1 % ophthalmic suspension USE 1 DROP INTO LEFT EYE EVERY 2 HOURS (Patient not taking: Reported on 3/13/2023)   • [DISCONTINUED] levothyroxine 150 mcg tablet Take 150 mcg by mouth daily       Objective     /67 (BP Location: Left arm, Patient Position: Sitting, Cuff Size: Standard)   Pulse 90   Temp 97 8 °F (36 6 °C)   Ht 5' 3" (1 6 m)   Wt 103 kg (228 lb)   SpO2 96%   BMI 40 39 kg/m²     Physical Exam  Constitutional:       General: She is not in acute distress  Appearance: Normal appearance  She is obese  She is not ill-appearing, toxic-appearing or diaphoretic  HENT:      Nose: Nose normal    Eyes:      Extraocular Movements: Extraocular movements intact  Conjunctiva/sclera: Conjunctivae normal    Cardiovascular:      Rate and Rhythm: Normal rate and regular rhythm  Heart sounds: Normal heart sounds  Pulmonary:      Effort: Pulmonary effort is normal  No respiratory distress  Breath sounds: Normal breath sounds  No wheezing  Abdominal:      General: Abdomen is flat  Musculoskeletal:         General: No swelling or deformity  Normal range of motion  Right shoulder: No swelling, deformity, tenderness or bony tenderness  Normal range of motion  Left shoulder: No swelling, deformity, tenderness or bony tenderness  Normal range of motion  Right hand: No swelling or deformity  Normal capillary refill  Left hand: No swelling or deformity  Normal capillary refill  Cervical back: Normal and normal range of motion  Thoracic back: Normal       Lumbar back: Normal       Right hip: No deformity or bony tenderness  Normal range of motion  Left hip: No deformity or bony tenderness  Normal range of motion  Right knee: Swelling present  No erythema or bony tenderness  Normal range of motion  No tenderness  Left knee: Swelling present  No erythema or bony tenderness  Normal range of motion  No tenderness  Skin:     Findings: No erythema or rash  Neurological:      Mental Status: She is alert and oriented to person, place, and time     Psychiatric:         Mood and Affect: Mood normal          Behavior: Behavior normal        KEIRA Lange

## 2023-03-14 ENCOUNTER — TELEPHONE (OUTPATIENT)
Age: 58
End: 2023-03-14

## 2023-03-14 NOTE — TELEPHONE ENCOUNTER
Based on current presentation and lab findings, primary differential at this time is polymyalgia rheumatica  Patient updated via phone call  Will await MARIBEL result prior to starting treatment  Message sent to Dr Mitchell [place] of endocrinology to update him on patient status; I would appreciate his input prior to starting oral steroids in order to prevent hyperglycemia as the patient's DM2 is currently well controlled

## 2023-03-15 DIAGNOSIS — M35.3 POLYMYALGIA RHEUMATICA (HCC): Primary | ICD-10-CM

## 2023-03-15 RX ORDER — PREDNISONE 1 MG/1
15 TABLET ORAL DAILY
Qty: 63 TABLET | Refills: 0 | Status: SHIPPED | OUTPATIENT
Start: 2023-03-15 | End: 2023-04-05

## 2023-03-15 NOTE — PROGRESS NOTES
Current presentation is highly consistent with polymyalgia rheumatica  Currently waiting on MARIBEL result and endocrinology input  To prevent a delay in care, will start patient on prednisone taper: Initial dosing 15 mg daily x3 weeks  Next step is to transition to 12 5 mg daily x2 weeks  Will start prednisone ASAP to prevent development of GCA  Will attempt to keep prednisone dosing and duration as minimal as possible due to history of diabetes  Patient was also offered and declined the following options: 1) Referral to rheumatology 2) delay in treatment until endocrinology signed off  [facility] to schedule a follow-up visit next week to discuss plan of care and medication side effects  Only the order for 15 mg prednisone dosing was ordered today  Will consult clinical pharmacist to assist with overall prednisone taper and further orders

## 2023-03-17 LAB
ANA PAT SER IF-IMP: ABNORMAL
ANA SER QL IF: POSITIVE
ANA TITR SER IF: ABNORMAL TITER
B BURGDOR AB SER IA-ACNC: <0.9 INDEX
BASOPHILS # BLD AUTO: 52 CELLS/UL (ref 0–200)
BASOPHILS NFR BLD AUTO: 0.6 %
CRP SERPL-MCNC: 29.2 MG/L
EOSINOPHIL # BLD AUTO: 155 CELLS/UL (ref 15–500)
EOSINOPHIL NFR BLD AUTO: 1.8 %
ERYTHROCYTE [DISTWIDTH] IN BLOOD BY AUTOMATED COUNT: 12.6 % (ref 11–15)
ERYTHROCYTE [SEDIMENTATION RATE] IN BLOOD BY WESTERGREN METHOD: 36 MM/H
HCT VFR BLD AUTO: 40.7 % (ref 35–45)
HGB BLD-MCNC: 14 G/DL (ref 11.7–15.5)
LYMPHOCYTES # BLD AUTO: 1496 CELLS/UL (ref 850–3900)
LYMPHOCYTES NFR BLD AUTO: 17.4 %
MCH RBC QN AUTO: 32.6 PG (ref 27–33)
MCHC RBC AUTO-ENTMCNC: 34.4 G/DL (ref 32–36)
MCV RBC AUTO: 94.9 FL (ref 80–100)
MONOCYTES # BLD AUTO: 559 CELLS/UL (ref 200–950)
MONOCYTES NFR BLD AUTO: 6.5 %
NEUTROPHILS # BLD AUTO: 6338 CELLS/UL (ref 1500–7800)
NEUTROPHILS NFR BLD AUTO: 73.7 %
PLATELET # BLD AUTO: 224 THOUSAND/UL (ref 140–400)
PMV BLD REES-ECKER: 12 FL (ref 7.5–12.5)
RBC # BLD AUTO: 4.29 MILLION/UL (ref 3.8–5.1)
RHEUMATOID FACT SERPL-ACNC: <14 IU/ML
WBC # BLD AUTO: 8.6 THOUSAND/UL (ref 3.8–10.8)

## 2023-03-21 PROBLEM — R76.8 ANA POSITIVE: Status: ACTIVE | Noted: 2023-03-21

## 2023-03-21 NOTE — PROGRESS NOTES
Virtual Regular Visit    Verification of patient location:    Patient is located in the following state in which I hold an active license [place]      Assessment/Plan:    Problem List Items Addressed This Visit        Endocrine    Type 2 diabetes mellitus ([facility] )     Patient has been checking her blood sugar more frequently since starting oral steroids per her endocrinologist's recommendation  Patient reports an average blood sugar of around 110  Instructed the patient to reach out to her endocrinology team if she starts to notice an upward trend in her blood sugars  Lab Results   Component Value Date    HGBA1C 6 0 01/21/2023               Other    Polymyalgia rheumatica ([facility] ) - Primary     Patient started her oral steroid taper last week and is reporting "a 98% resolution of her symptoms"  Patient is following up with our clinical pharmacist next week to discuss medication side effects and to fine-tune her overall dosing  Patient is following up with our office on April 10th for a wellness visit, will repeat CRP and sed rate at that time  Relevant Orders    Ambulatory Referral to Rheumatology    MARIBEL positive     Patient is MARIBEL positive with a titer of 1:80  Pattern is not consistent with SLE or Sjogren's  Educated patient that this titer and pattern can be seen in individuals without an autoimmune disease  To confirm a diagnosis of polymyalgia rheumatica and rule out other autoimmune diseases, will refer patient to rheumatology Brennan Taylor  Relevant Orders    Ambulatory Referral to Rheumatology            Reason for visit is   Chief Complaint   Patient presents with   • Follow-up        Encounter provider KEIRA Bob    Provider located at [address]  [facility] [address]      Recent Visits  No visits were found meeting these conditions    Showing recent visits within past 7 days and meeting all other requirements  Today's Visits  Date Type Provider Dept   03/23/23 Telemedicine Brenda Lloyd, [phone]  today's visits and meeting all other requirements  Future Appointments  No visits were found meeting these conditions  Showing future appointments within next 150 days and meeting all other requirements       The patient was identified by name and date of birth  Samuel Pedersen was informed that this is a telemedicine visit and that the visit is being conducted through the [employer]  She agrees to proceed     My office door was closed  No one else was in the room  She acknowledged consent and understanding of privacy and security of the video platform  The patient has agreed to participate and understands they can discontinue the visit at any time  Patient is aware this is a billable service  Subjective  Samuel Pedersen is a 62 y o  female  Patient presents today for virtual follow-up to further discuss: Suspected polymyalgia rheumatica  - Patient was initially seen on March 13th with complaints of bilateral hip and shoulder pain x 2 to 3 weeks  - Lab work ordered: CRP and sed rate both elevated    Presentation highly suggestive of polymyalgia rheumatica    Based on shared decision making, started patient on an oral steroid taper on March 15th  - MARIBEL titer resulted on the 17th, titer = 1:80, pattern: Nuclear, dense fine speckled       Past Medical History:   Diagnosis Date   • Anxiety    • High cholesterol    • Hypertension    • Hypothyroid    • 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   • Thyroid cancer ([facility] ) 2016       Past Surgical History:   Procedure Laterality Date   • BACK SURGERY     • BREAST EXCISIONAL BIOPSY Bilateral     benign - over 35 years   • CARPAL TUNNEL RELEASE Right    • ENDOMETRIAL ABLATION     • HYSTERECTOMY      age- 28   • LAMINECTOMY         Current Outpatient Medications   Medication Sig Dispense Refill   • atorvastatin (LIPITOR) 80 mg tablet TAKE 1 TABLET BY MOUTH EVERY DAY 90 tablet 1   • Calcium Carb-Cholecalciferol (Calcium/Vitamin D) 600-400 MG-UNIT TABS Take 1 tablet by mouth 2 (two) times a day     • dicyclomine (BENTYL) 10 mg capsule Take 10 mg by mouth 4 (four) times a day (before meals and at bedtime)     • diphenhydrAMINE (BENADRYL) 25 mg tablet      • Farxiga 10 MG tablet Take 1 tablet (10 mg total) by mouth daily 30 tablet 3   • fexofenadine (ALLEGRA) 180 MG tablet Take 180 mg by mouth daily PRN     • fluticasone (FLONASE) 50 mcg/act nasal spray      • hydrochlorothiazide (HYDRODIURIL) 25 mg tablet Take 12 5 mg by mouth daily     • levothyroxine 150 mcg tablet Take 1 tablet (150 mcg total) by mouth daily 90 tablet 0   • lisinopril (ZESTRIL) 40 mg tablet Take 40 mg by mouth     • Magnesium 400 MG CAPS Take 1 each by mouth daily     • Misc Natural Products (FIBER SUPREME PO) Take 1 Scoop by mouth daily     • Omega-3 Fatty Acids (Fish Oil) 1200 MG CAPS Take 1 capsule by mouth in the morning     • omeprazole (PriLOSEC) 20 mg delayed release capsule Take 1 capsule (20 mg total) by mouth daily 90 capsule 1   • predniSONE 5 mg tablet Take 3 tablets (15 mg total) by mouth daily for 21 days 63 tablet 0   • Probiotic Product (CULTURELLE PROBIOTICS PO) Take 1 each by mouth daily     • sertraline (ZOLOFT) 100 mg tablet TAKE 2 TABLETS BY MOUTH EVERY  tablet 1   • Vitamin B Complex-C CAPS Take 1 each by mouth in the morning     • acetaminophen-codeine (TYLENOL with CODEINE #3) 300-30 MG per tablet Take 1 tablet by mouth every 4 (four) hours as needed     • fexofenadine-pseudoephedrine (ALLEGRA-D 24) 180-240 MG per 24 hr tablet Take by mouth     • hydrOXYzine HCL (ATARAX) 10 mg tablet Take 1 tablet (10 mg total) by mouth every 6 (six) hours as needed for anxiety 30 tablet 1   • prednisoLONE acetate (PRED FORTE) 1 % ophthalmic suspension        No current facility-administered medications for this visit  Allergies   Allergen Reactions   • Penicillins Anaphylaxis   • Clarithromycin Hives and Other (See Comments)     GI Upset   • Sulfamethoxazole-Trimethoprim Diarrhea       Review of Systems   Constitutional: Negative  HENT: Negative  Eyes: Negative  Respiratory: Negative  Cardiovascular: Negative  Gastrointestinal: Negative  Endocrine: Negative  Genitourinary: Negative  Musculoskeletal: Negative  Negative for arthralgias, joint swelling and myalgias  Skin: Negative  Allergic/Immunologic: Negative  Neurological: Negative  Hematological: Negative  Psychiatric/Behavioral: Negative  Video Exam    Vitals:    03/23/23 0956   Weight: 99 3 kg (219 lb)   Height: 5' 3" (1 6 m)       Physical Exam  Constitutional:       General: She is not in acute distress  Appearance: Normal appearance  HENT:      Nose: Nose normal    Eyes:      Extraocular Movements: Extraocular movements intact  Conjunctiva/sclera: Conjunctivae normal    Pulmonary:      Effort: Pulmonary effort is normal  No respiratory distress  Breath sounds: No wheezing  Abdominal:      General: Abdomen is flat  Musculoskeletal:         General: No swelling or deformity  Normal range of motion  Cervical back: Normal range of motion  Skin:     Findings: No erythema or rash  Neurological:      Mental Status: She is alert and oriented to person, place, and time     Psychiatric:         Mood and Affect: Mood normal          Behavior: Behavior normal           I spent 20 minutes directly with the patient during this visit

## 2023-03-23 ENCOUNTER — TELEMEDICINE (OUTPATIENT)
Dept: FAMILY MEDICINE CLINIC | Facility: CLINIC | Age: 58
End: 2023-03-23

## 2023-03-23 VITALS — BODY MASS INDEX: 38.8 KG/M2 | WEIGHT: 219 LBS | HEIGHT: 63 IN

## 2023-03-23 DIAGNOSIS — E11.9 TYPE 2 DIABETES MELLITUS WITHOUT COMPLICATION, WITHOUT LONG-TERM CURRENT USE OF INSULIN (HCC): ICD-10-CM

## 2023-03-23 DIAGNOSIS — R76.8 ANA POSITIVE: ICD-10-CM

## 2023-03-23 DIAGNOSIS — M35.3 POLYMYALGIA RHEUMATICA (HCC): Primary | ICD-10-CM

## 2023-03-23 NOTE — ASSESSMENT & PLAN NOTE
Patient started her oral steroid taper last week and is reporting "a 98% resolution of her symptoms"  Patient is following up with our clinical pharmacist next week to discuss medication side effects and to fine-tune her overall dosing  Patient is following up with our office on April 10th for a wellness visit, will repeat CRP and sed rate at that time

## 2023-03-23 NOTE — ASSESSMENT & PLAN NOTE
Patient is MARIBEL positive with a titer of 1:80  Pattern is not consistent with SLE or Sjogren's  Educated patient that this titer and pattern can be seen in individuals without an autoimmune disease  To confirm a diagnosis of polymyalgia rheumatica and rule out other autoimmune diseases, will refer patient to rheumatology [place])

## 2023-03-23 NOTE — ASSESSMENT & PLAN NOTE
Patient has been checking her blood sugar more frequently since starting oral steroids per her endocrinologist's recommendation  Patient reports an average blood sugar of around 110  Instructed the patient to reach out to her endocrinology team if she starts to notice an upward trend in her blood sugars        Lab Results   Component Value Date    HGBA1C 6 0 01/21/2023

## 2023-03-28 ENCOUNTER — RA CDI HCC (OUTPATIENT)
Dept: OTHER | Facility: HOSPITAL | Age: 58
End: 2023-03-28

## 2023-03-28 NOTE — PROGRESS NOTES
[facility]  coding opportunities       Chart reviewed, no opportunity found: CHART REVIEWED, NO OPPORTUNITY FOUND        Patients Insurance        Commercial Insurance: [address] [place] [address]

## 2023-03-29 DIAGNOSIS — M35.3 POLYMYALGIA RHEUMATICA (HCC): Primary | ICD-10-CM

## 2023-03-29 NOTE — TELEPHONE ENCOUNTER
[address] Pharmacist    Ke Banks is a 62 y o  female who was referred to the pharmacist for Polymyalgia rheumatica / steroid taper education and management, referred by KEIRA Mckee   Telemedicine consent  The patient was identified by name and date of birth  Ke Banks was informed that this is a telemedicine visit and that the visit is being conducted through Telephone  My office door was closed  No one else was in the room  She acknowledged consent and understanding of privacy and security of the video platform  The patient has agreed to participate and understands they can discontinue the visit at any time  Assessment/ Plan       1  Polymyalgia rheumatica    · Patient is on prednisone 15 mg total daily (5 mg TID)  Since starting medication on 3/15/23 she reports 98% resolution of symptoms  She did accidentally miss a 5 mg dose in the evening on both 3/27 & 3/28 so only got 10 mg total both days and reports that polymyalgia symptoms returned  Discussed need to do a very slow taper to not exacerbate symptoms  Fasting glucose has remained controlled (110- 130 mg/dL) despite steroid use  · Recommended taper:  · Dose > 10 mg of prednisone/day, the dose can be lowered by 2 5 mg/day decrements every two to four weeks  · Below 10 mg/day, the use of 1 mg decrements is advised because of the characteristic and distinctive sensitivity of the symptoms of PMR to even minor changes in the glucocorticoid dose  Changes to Medication Regimen: If PCP is in agreement with plan  • Continue prednisone 15 mg daily (5 mg TID) until 4/5  At that time decrease dose to 12 5 mg daily ( 5 mg AM, 5 mg afternoon, and 2 5 mg PM) for 3 weeks  2  Medication Reconciliation:   • Medication list reviewed with pt at today's visit     • Medication list updated to reflect medications pt is currently taking    Monitoring: symptoms of PMR, blood sugar checks daily, repeat CRP and red rate     Follow-up:   April 10- PCP  April 24-appt with Rheumatologist   April 26 - clinical pharmacist       Subjective     1   Medication Adherence/ Tolerability:  • Prednisone 15 mg total- 5 mg TID; missed 5 mg evening dose on 3/27 and 3/28      Objective       ASCVD Risk:  The 10-year ASCVD risk score (Emil FREEMAN, et al , 2019) is: 6 5%    Values used to calculate the score:      Age: 62 years      Sex: Female      Is Non- : No      Diabetic: Yes      Tobacco smoker: No      Systolic Blood Pressure: 091 mmHg      Is BP treated: Yes      HDL Cholesterol: 63 mg/dL      Total Cholesterol: 191 mg/dL     Home blood sugar:  Fasting readings 110-130 mg/dL    Labs:    Lab Results   Component Value Date    MICROALBCRE 18 01/21/2023         Pharmacist Tracking Tool     Pharmacist Tracking Tool  Reason For Outreach: Embedded Pharmacist  Demographics:  Intervention Method: Phone  Type of Intervention: Follow-Up  Topics Addressed: Other  Pharmacologic Interventions: Dose or Frequency Adjusted  Non-Pharmacologic Interventions: Disease state education and Medication/Device education  Time:  Direct Patient Care: 15 mins  Care Coordination: 5 mins  Recommendation Recipient: Patient/Caregiver and Provider  Outcome: Accepted

## 2023-03-30 RX ORDER — PREDNISONE 1 MG/1
TABLET ORAL
Qty: 42 TABLET | Refills: 0 | Status: SHIPPED | OUTPATIENT
Start: 2023-04-05

## 2023-03-30 RX ORDER — PREDNISONE 2.5 MG
2.5 TABLET ORAL
Qty: 21 TABLET | Refills: 0 | Status: SHIPPED | OUTPATIENT
Start: 2023-04-05

## 2023-04-10 PROBLEM — Z00.00 ROUTINE GENERAL MEDICAL EXAMINATION AT A HEALTH CARE FACILITY: Status: ACTIVE | Noted: 2023-04-10

## 2023-04-10 PROBLEM — Z11.4 SCREENING FOR HIV (HUMAN IMMUNODEFICIENCY VIRUS): Status: ACTIVE | Noted: 2023-04-10

## 2023-04-10 PROBLEM — E66.9 OBESITY (BMI 30-39.9): Status: ACTIVE | Noted: 2023-04-10

## 2023-04-10 PROBLEM — E78.2 MIXED HYPERLIPIDEMIA: Status: ACTIVE | Noted: 2023-04-10

## 2023-04-20 ENCOUNTER — TELEPHONE (OUTPATIENT)
Dept: ADMINISTRATIVE | Facility: OTHER | Age: 58
End: 2023-04-20

## 2023-04-20 NOTE — TELEPHONE ENCOUNTER
----- Message from Iain Beth sent at 4/19/2023  4:36 PM EDT -----  Regarding: Care Gap request  04/19/23 4:36 PM    Hello, our patient attached above has had Glomerular Filtration Rate (GFR) completed/performed  Please assist in updating the patient chart by pulling the document from LAB Tab within Chart Review  The date of service is 2023         Thank you,  Andreas Wilhelm  [address]

## 2023-04-24 NOTE — TELEPHONE ENCOUNTER
Upon review of the In Basket request we were able to locate, review, and update the patient chart as requested for eGFR  HM updated with most recent BMP  Labs from 2023 were orders only  Any additional questions or concerns should be emailed to the Practice Liaisons via the appropriate education email address, please do not reply via In Basket      Thank you  Erin Cabral

## 2023-04-26 LAB — HCV AB SER-ACNC: NEGATIVE

## 2023-04-27 ENCOUNTER — CLINICAL SUPPORT (OUTPATIENT)
Dept: FAMILY MEDICINE CLINIC | Facility: CLINIC | Age: 58
End: 2023-04-27

## 2023-04-27 DIAGNOSIS — M35.3 POLYMYALGIA RHEUMATICA (HCC): ICD-10-CM

## 2023-04-27 NOTE — PROGRESS NOTES
[address]    Charlie Fonseca is a 62 y o  female who was referred to the pharmacist for Polymyalgia rheumatica / steroid taper education and management, referred by KEIRA Lafleur   Telemedicine consent  The patient was identified by name and date of birth  Charlie Fonseca was informed that this is a telemedicine visit and that the visit is being conducted through Telephone  My office door was closed  No one else was in the room  She acknowledged consent and understanding of privacy and security of the video platform  The patient has agreed to participate and understands they can discontinue the visit at any time  Assessment/ Plan       1  Polymyalgia rheumatica    · Following with Rheumatology  She was stated on methotrexate 15 mg weekly and folic acid daily  Prednisone is being tapered down  She had some questions about the new medication  Discussed side effects, risks, and signs and symptoms of monitoring for methotrexate toxicity with patient including N/V/D, myositis, SOB, and cutaneous ulcerations  Discussed importance of daily folic acid  Blood sugar readings have remaining controlled 110- 130 mg/dL while on prednisone    Changes to Medication Regimen:   None - continue to follow medication adjustment per rheumatology  2  Medication Reconciliation:   • Medication list reviewed with pt at today's visit  • Medication list updated to reflect medications pt is currently taking    Monitoring: symptoms of PMR, blood sugar checks daily    Follow-up: 4 weeks     Subjective     1   Medication Adherence/ Tolerability:  • Following with Rheumatology  o Started on methotrexate 2 5 mg 6 tabs (15 mg total) every week and Folic acid 1 mg daily  o Prednisone 15 mg x 5 days (start on the 30th), then 12 5 mg x 2 weeks, then 10 mg daily      Objective       ASCVD Risk:  The 10-year ASCVD risk score (Emil FREEMAN, et al , 2019) is: 5 3%    Values used to calculate the score:      Age: 62 years      Sex: Female      Is Non- : No      Diabetic: Yes      Tobacco smoker: No      Systolic Blood Pressure: 464 mmHg      Is BP treated: Yes      HDL Cholesterol: 63 mg/dL      Total Cholesterol: 191 mg/dL     Home blood sugar:  Fasting readings 110-130 mg/dL  Highest reading has been 130 mg/dL    Labs:    Lab Results   Component Value Date    EGFR 88 06/04/2022    MICROALBCRE 18 01/21/2023         Pharmacist Tracking Tool     Pharmacist Tracking Tool  Reason For Outreach: Embedded Pharmacist  Demographics:  Intervention Method: Phone  Type of Intervention: Follow-Up  Topics Addressed: Other  Pharmacologic Interventions: Med Rec  Non-Pharmacologic Interventions: Disease state education and Medication/Device education  Time:  Direct Patient Care: 15 mins  Care Coordination: 5 mins  Recommendation Recipient: Patient/Caregiver and Provider  Outcome: Accepted

## 2023-04-28 RX ORDER — METHOTREXATE 2.5 MG/1
6 TABLET ORAL WEEKLY
COMMUNITY
Start: 2023-04-24

## 2023-04-28 RX ORDER — FOLIC ACID 1 MG/1
1000 TABLET ORAL DAILY
COMMUNITY
Start: 2023-04-24

## 2023-05-07 DIAGNOSIS — F41.9 ANXIETY: ICD-10-CM

## 2023-05-08 RX ORDER — SERTRALINE HYDROCHLORIDE 100 MG/1
200 TABLET, FILM COATED ORAL DAILY
Qty: 180 TABLET | Refills: 0 | Status: SHIPPED | OUTPATIENT
Start: 2023-05-08

## 2023-05-13 ENCOUNTER — HOSPITAL ENCOUNTER (OUTPATIENT)
Dept: ULTRASOUND IMAGING | Facility: HOSPITAL | Age: 58
Discharge: HOME | End: 2023-05-13
Attending: STUDENT IN AN ORGANIZED HEALTH CARE EDUCATION/TRAINING PROGRAM

## 2023-05-13 DIAGNOSIS — C73 THYROID CANCER (HCC): ICD-10-CM

## 2023-05-13 LAB — HBA1C MFR BLD HPLC: 6 %

## 2023-05-13 PROCEDURE — 3044F HG A1C LEVEL LT 7.0%: CPT | Performed by: STUDENT IN AN ORGANIZED HEALTH CARE EDUCATION/TRAINING PROGRAM

## 2023-05-21 ENCOUNTER — OFFICE VISIT (OUTPATIENT)
Dept: URGENT CARE | Facility: CLINIC | Age: 58
End: 2023-05-21

## 2023-05-21 VITALS
WEIGHT: 220 LBS | HEIGHT: 63 IN | TEMPERATURE: 97 F | HEART RATE: 95 BPM | RESPIRATION RATE: 18 BRPM | OXYGEN SATURATION: 94 % | BODY MASS INDEX: 38.98 KG/M2 | SYSTOLIC BLOOD PRESSURE: 145 MMHG | DIASTOLIC BLOOD PRESSURE: 79 MMHG

## 2023-05-21 DIAGNOSIS — M25.562 ACUTE PAIN OF LEFT KNEE: Primary | ICD-10-CM

## 2023-05-21 NOTE — PROGRESS NOTES
[facility]        NAME: Shania Pham is a 62 y o  female  : 1965    MRN: [id number]  DATE: May 21, 2023  TIME: 12:54 PM    Assessment and Plan   Acute pain of left knee [M25 562]  1  Acute pain of left knee  Diclofenac Sodium (VOLTAREN) 1 %    Ambulatory Referral to Orthopedic Surgery    Ambulatory Referral to Physical Therapy            Patient Instructions   Brace  Diclofenac  Tylenol  Ice  Range of motion  Physical therapy  Orthopedic surgery  Follow up with PCP in 3-5 days  Proceed to  ER if symptoms worsen  Chief Complaint     Chief Complaint   Patient presents with   • Knee Pain     Knee pain left knee stood up from a chair and felt a sudden sharp pain in her knee          History of Present Illness       Patient is a 59-year-old female with significant past medical history of hypertension and diabetes presents the office planing of left knee pain since yesterday  States she was sitting on a a chair and went to stand up and felt a sharp stabbing pain to the medial aspect  Pain is rated 8 out of 10 sharp with weightbearing  Denies any injury, trauma, or increase with activity  Denies any prior similar episodes to the knee  She tried ibuprofen and Ace bandage with no relief  Review of Systems   Review of Systems   Musculoskeletal: Positive for arthralgias  Negative for joint swelling  Neurological: Negative for numbness           Current Medications       Current Outpatient Medications:   •  atorvastatin (LIPITOR) 80 mg tablet, Take 1 tablet (80 mg total) by mouth daily, Disp: 90 tablet, Rfl: 1  •  Calcium Carb-Cholecalciferol (Calcium/Vitamin D) 600-400 MG-UNIT TABS, Take 1 tablet by mouth 2 (two) times a day, Disp: , Rfl:   •  Diclofenac Sodium (VOLTAREN) 1 %, Apply 2 g topically 4 (four) times a day, Disp: 50 g, Rfl: 0  •  diphenhydrAMINE (BENADRYL) 25 mg tablet, , Disp: , Rfl:   •  Farxiga 10 MG tablet, Take 1 tablet (10 mg total) by mouth daily, Disp: 30 tablet, Rfl: 3  •  fexofenadine (ALLEGRA) 180 MG tablet, Take 180 mg by mouth daily PRN, Disp: , Rfl:   •  fluticasone (FLONASE) 50 mcg/act nasal spray, , Disp: , Rfl:   •  folic acid (FOLVITE) 1 mg tablet, Take 1,000 mcg by mouth daily, Disp: , Rfl:   •  hydrochlorothiazide (HYDRODIURIL) 25 mg tablet, Take 12 5 mg by mouth daily, Disp: , Rfl:   •  hydrOXYzine HCL (ATARAX) 10 mg tablet, Take 1 tablet (10 mg total) by mouth every 6 (six) hours as needed for anxiety, Disp: 30 tablet, Rfl: 1  •  levothyroxine 150 mcg tablet, Take 1 tablet (150 mcg total) by mouth daily, Disp: 90 tablet, Rfl: 0  •  lisinopril (ZESTRIL) 40 mg tablet, Take 1 tablet (40 mg total) by mouth daily, Disp: 90 tablet, Rfl: 1  •  Magnesium 400 MG CAPS, Take 1 each by mouth daily, Disp: , Rfl:   •  methotrexate 2 5 MG tablet, Take 6 tablets by mouth once a week, Disp: , Rfl:   •  Misc Natural Products (FIBER SUPREME PO), Take 1 Scoop by mouth daily, Disp: , Rfl:   •  Omega-3 Fatty Acids (Fish Oil) 1200 MG CAPS, Take 1 capsule by mouth in the morning, Disp: , Rfl:   •  omeprazole (PriLOSEC) 20 mg delayed release capsule, Take 1 capsule (20 mg total) by mouth daily, Disp: 90 capsule, Rfl: 1  •  predniSONE 5 mg tablet, Take 1 tablet (5 mg total) by mouth daily with breakfast AND 1 tablet (5 mg total) daily with lunch , Disp: 33 tablet, Rfl: 0  •  Probiotic Product (CULTURELLE PROBIOTICS PO), Take 1 each by mouth daily, Disp: , Rfl:   •  sertraline (ZOLOFT) 100 mg tablet, Take 2 tablets (200 mg total) by mouth daily, Disp: 180 tablet, Rfl: 0  •  Vitamin B Complex-C CAPS, Take 1 each by mouth in the morning, Disp: , Rfl:     Current Allergies     Allergies as of 05/21/2023 - Reviewed 05/21/2023   Allergen Reaction Noted   • Penicillins Anaphylaxis 12/13/2012   • Clarithromycin Hives and Other (See Comments) 01/10/2013   • Sulfamethoxazole-trimethoprim Diarrhea 12/13/2012            The following portions of the patient's history were reviewed and updated as "appropriate: allergies, current medications, past family history, past medical history, past social history, past surgical history and problem list      Past Medical History:   Diagnosis Date   • Anxiety    • High cholesterol    • Hypertension    • Hypothyroid    • 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   • Thyroid cancer ([facility] [address] ) 2016       Past Surgical History:   Procedure Laterality Date   • BACK SURGERY     • BREAST EXCISIONAL BIOPSY Bilateral     benign - over 35 years   • CARPAL TUNNEL RELEASE Right    • ENDOMETRIAL ABLATION     • LAMINECTOMY     • TOTAL VAGINAL HYSTERECTOMY         Family History   Problem Relation Age of Onset   • No Known Problems Mother    • No Known Problems Father    • No Known Problems Sister    • Liver cancer Sister    • No Known Problems Maternal Grandmother    • No Known Problems Maternal Grandfather    • No Known Problems Paternal Grandmother    • No Known Problems Paternal Grandfather    • No Known Problems Maternal Aunt    • No Known Problems Maternal Aunt    • No Known Problems Maternal Aunt    • No Known Problems Maternal Aunt    • No Known Problems Maternal Aunt          Medications have been verified  Objective   /79   Pulse 95   Temp (!) 97 °F (36 1 °C)   Resp 18   Ht 5' 3\" (1 6 m)   Wt 99 8 kg (220 lb)   SpO2 94%   BMI 38 97 kg/m²   No LMP recorded  Patient has had a hysterectomy  Physical Exam     Physical Exam  Vitals and nursing note reviewed  Constitutional:       Appearance: She is well-developed  HENT:      Head: Normocephalic and atraumatic  Right Ear: External ear normal       Left Ear: External ear normal       Nose: Nose normal    Eyes:      General: Lids are normal       Conjunctiva/sclera: Conjunctivae normal    Musculoskeletal:      Left knee: No swelling, effusion, erythema or bony tenderness  Normal range of motion  Tenderness present over the medial joint line  No patellar tendon tenderness  MCL laxity (pain) present   " Normal alignment, normal meniscus and normal patellar mobility  Normal pulse  Instability Tests: Anterior drawer test negative  Posterior drawer test negative  Anterior Lachman test negative  Medial Manuel test negative and lateral Manuel test negative  Skin:     General: Skin is warm and dry  Capillary Refill: Capillary refill takes less than 2 seconds  Findings: No rash  Neurological:      Mental Status: She is alert         Premade hinged knee brace applied to left knee by tech [employer]

## 2023-05-25 ENCOUNTER — OFFICE VISIT (OUTPATIENT)
Dept: ENDOCRINOLOGY | Facility: CLINIC | Age: 58
End: 2023-05-25

## 2023-05-25 VITALS
BODY MASS INDEX: 40.22 KG/M2 | HEIGHT: 63 IN | WEIGHT: 227 LBS | HEART RATE: 100 BPM | SYSTOLIC BLOOD PRESSURE: 142 MMHG | DIASTOLIC BLOOD PRESSURE: 78 MMHG

## 2023-05-25 DIAGNOSIS — I10 PRIMARY HYPERTENSION: ICD-10-CM

## 2023-05-25 DIAGNOSIS — E89.0 POSTOPERATIVE HYPOTHYROIDISM: ICD-10-CM

## 2023-05-25 DIAGNOSIS — E78.2 MIXED HYPERLIPIDEMIA: ICD-10-CM

## 2023-05-25 DIAGNOSIS — C73 THYROID CANCER (HCC): Primary | ICD-10-CM

## 2023-05-25 DIAGNOSIS — E11.9 TYPE 2 DIABETES MELLITUS WITHOUT COMPLICATION, WITHOUT LONG-TERM CURRENT USE OF INSULIN (HCC): ICD-10-CM

## 2023-05-25 RX ORDER — LEVOTHYROXINE SODIUM 0.15 MG/1
150 TABLET ORAL DAILY
Qty: 90 TABLET | Refills: 3 | Status: SHIPPED | OUTPATIENT
Start: 2023-05-25 | End: 2023-08-23

## 2023-05-25 RX ORDER — DAPAGLIFLOZIN 10 MG/1
10 TABLET, FILM COATED ORAL DAILY
Qty: 90 TABLET | Refills: 3 | Status: SHIPPED | OUTPATIENT
Start: 2023-05-25

## 2023-05-25 NOTE — PROGRESS NOTES
Edwige De Paz 62 y o  female MRN: [id number]    Encounter: [id number]      Assessment/Plan     1  Thyroid cancer - no evidence of disease based on recent labs and imaging  Current dosing of levothyroxine 150 mcg daily with plans to repeat thyroid labs including markers plus baseline neck US in 6-mo     2  Post-operative hypothyroidism - thyroid levels are in range  Continue present dosing of levothyroxine     3  Type 2 diabetes - well controlled on farxiga monotherapy  Should monitor BG more frequently, especially while on steroids  Marta Thomas is up to date on her screening examinations  Will arrange follow up labs in 6-months for monitoring     4  Hyperlipidemia - continue statin Rx  Will update lipid panel in 6-months     5  Hypertension - Fair control  Continue HCTZ 12 5 mg daily, lisinopril 40 mg daily    6  Polymyalgia rheumatica - newly diagnosed  Following with     Problem List Items Addressed This Visit        Endocrine    Type 2 diabetes mellitus ([facility] )    Relevant Medications    Farxiga 10 MG tablet    Other Relevant Orders    Basic metabolic panel Lab Collect    Lipid Panel with Direct LDL reflex Lab Collect    Hemoglobin A1c (w/out EAG)       Other    Mixed hyperlipidemia   Other Visit Diagnoses     Thyroid cancer ([facility] )    -  Primary    Relevant Medications    levothyroxine 150 mcg tablet    Other Relevant Orders    T4, free Lab Collect    TSH, 3rd generation Lab Collect    Postoperative hypothyroidism        Relevant Medications    levothyroxine 150 mcg tablet    Primary hypertension            Labs 6-mo  RTC 12-mo    CC: type 2 diabetes, post-operative hypothyroidism    History of Present Illness     HPI:    Marta Thomas returns today in follow-up of type 2 diabetes and postoperative hypothyroidism secondary to thyroid cancer  She reports diagnosis of polymyalgia rheumatica, for which she is being treated with tapering doses of prednisone in addition to methotrexate   She is following with Dr Caroline Dumont of Rheumatology  For diabetes, she reports stable SMBG despite steroid treatment  She has had weight gain  She is continued on farxiga 10 mg daily, and her A1c is 6%  She is up to date on screening examinations  For hypothyroidism, she is taking levothyroxine 150 mcg daily  She denies any neck complaints, and hyperthyroid symptoms  She has labs and imaging to review, including lymph node mapping US  Review of Systems   Constitutional: Positive for unexpected weight change  Negative for diaphoresis  HENT: Negative for trouble swallowing and voice change  Gastrointestinal: Negative for nausea and vomiting  Endocrine: Negative for heat intolerance, polydipsia and polyuria  Musculoskeletal: Positive for arthralgias  Neurological: Negative for tremors and weakness         Historical Information   Past Medical History:   Diagnosis Date   • Anxiety    • High cholesterol    • Hypertension    • Hypothyroid    • 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 [address] ) 2016     Past Surgical History:   Procedure Laterality Date   • BACK SURGERY     • BREAST EXCISIONAL BIOPSY Bilateral     benign - over 35 years   • CARPAL TUNNEL RELEASE Right    • ENDOMETRIAL ABLATION     • LAMINECTOMY     • TOTAL VAGINAL HYSTERECTOMY       Social History   Social History     Substance and Sexual Activity   Alcohol Use Yes   • Alcohol/week: 1 0 standard drink of alcohol   • Types: 1 Standard drinks or equivalent per week     Social History     Substance and Sexual Activity   Drug Use Never     Social History     Tobacco Use   Smoking Status Former   Smokeless Tobacco Never   Tobacco Comments    QUIT 2010     Family History:   Family History   Problem Relation Age of Onset   • No Known Problems Mother    • No Known Problems Father    • No Known Problems Sister    • Liver cancer Sister    • No Known Problems Maternal Grandmother    • No Known Problems Maternal Grandfather    • No Known Problems Paternal Grandmother • No Known Problems Paternal Grandfather    • No Known Problems Maternal Aunt    • No Known Problems Maternal Aunt    • No Known Problems Maternal Aunt    • No Known Problems Maternal Aunt    • No Known Problems Maternal Aunt        Meds/Allergies   Current Outpatient Medications   Medication Sig Dispense Refill   • atorvastatin (LIPITOR) 80 mg tablet Take 1 tablet (80 mg total) by mouth daily 90 tablet 1   • Calcium Carb-Cholecalciferol (Calcium/Vitamin D) 600-400 MG-UNIT TABS Take 1 tablet by mouth 2 (two) times a day     • diphenhydrAMINE (BENADRYL) 25 mg tablet      • Farxiga 10 MG tablet Take 1 tablet (10 mg total) by mouth daily 90 tablet 3   • fexofenadine (ALLEGRA) 180 MG tablet Take 180 mg by mouth daily PRN     • fluticasone (FLONASE) 50 mcg/act nasal spray      • folic acid (FOLVITE) 1 mg tablet Take 1,000 mcg by mouth daily     • hydrochlorothiazide (HYDRODIURIL) 25 mg tablet Take 12 5 mg by mouth daily     • hydrOXYzine HCL (ATARAX) 10 mg tablet Take 1 tablet (10 mg total) by mouth every 6 (six) hours as needed for anxiety 30 tablet 1   • levothyroxine 150 mcg tablet Take 1 tablet (150 mcg total) by mouth daily 90 tablet 3   • lisinopril (ZESTRIL) 40 mg tablet Take 1 tablet (40 mg total) by mouth daily 90 tablet 1   • Magnesium 400 MG CAPS Take 1 each by mouth daily     • methotrexate 2 5 MG tablet Take 6 tablets by mouth once a week     • Misc Natural Products (FIBER SUPREME PO) Take 1 Scoop by mouth daily     • Omega-3 Fatty Acids (Fish Oil) 1200 MG CAPS Take 1 capsule by mouth in the morning     • omeprazole (PriLOSEC) 20 mg delayed release capsule Take 1 capsule (20 mg total) by mouth daily 90 capsule 1   • predniSONE 5 mg tablet Take 1 tablet (5 mg total) by mouth daily with breakfast AND 1 tablet (5 mg total) daily with lunch   33 tablet 0   • Probiotic Product (CULTURELLE PROBIOTICS PO) Take 1 each by mouth daily     • sertraline (ZOLOFT) 100 mg tablet Take 2 tablets (200 mg total) by mouth "daily 180 tablet 0   • Vitamin B Complex-C CAPS Take 1 each by mouth in the morning     • Diclofenac Sodium (VOLTAREN) 1 % Apply 2 g topically 4 (four) times a day 50 g 0     No current facility-administered medications for this visit  Allergies   Allergen Reactions   • Penicillins Anaphylaxis   • Clarithromycin Hives and Other (See Comments)     GI Upset   • Sulfamethoxazole-Trimethoprim Diarrhea       Objective   Vitals: Blood pressure 142/78, pulse 100, height 5' 3\" (1 6 m), weight 103 kg (227 lb)  Physical Exam  Vitals reviewed  Constitutional:       Appearance: Normal appearance  HENT:      Head: Normocephalic and atraumatic  Nose: Nose normal    Eyes:      General: No scleral icterus  Conjunctiva/sclera: Conjunctivae normal    Neck:      Thyroid: No thyroid mass  Comments: No palpable tissue in thyroid bed  Pulmonary:      Effort: Pulmonary effort is normal  No respiratory distress  Musculoskeletal:      Cervical back: Normal range of motion  Right lower leg: No edema  Left lower leg: No edema  Lymphadenopathy:      Cervical: No cervical adenopathy  Neurological:      General: No focal deficit present  Mental Status: She is alert  Psychiatric:         Mood and Affect: Mood normal          Behavior: Behavior normal          The history was obtained from the review of the chart, patient  Lab Results:              Imaging Studies:   Results for orders placed in visit on 05/15/23    US thyroid  NECK ULTRASOUND     INDICATION:     C73:  Malignant neoplasm of thyroid gland      COMPARISON: Thyroid ultrasound June 25, 2021     FINDINGS:     Ultrasound of the thyroidectomy bed and cervical lymph node chains was performed with a high frequency linear transducer      There is no suspicion of recurrent mass in the thyroidectomy bed      Lymph nodes are similar in appearance to prior study, and maintain normal morphologic contour, echogenicity and short axis dimensions of " "up to 0 7 cm  No evidence for microcalcification or focal nodularity      IMPRESSION:     No evidence of recurrent or metastatic disease        I have personally reviewed pertinent reports  Portions of the record may have been created with voice recognition software  Occasional wrong word or \"sound a like\" substitutions may have occurred due to the inherent limitations of voice recognition software  Read the chart carefully and recognize, using context, where substitutions have occurred    "

## 2023-05-30 ENCOUNTER — HOSPITAL ENCOUNTER (OUTPATIENT)
Dept: RADIOLOGY | Facility: CLINIC | Age: 58
Discharge: HOME | End: 2023-05-30

## 2023-05-30 ENCOUNTER — OFFICE VISIT (OUTPATIENT)
Dept: OBGYN CLINIC | Facility: CLINIC | Age: 58
End: 2023-05-30

## 2023-05-30 ENCOUNTER — TELEPHONE (OUTPATIENT)
Dept: PAIN MEDICINE | Facility: CLINIC | Age: 58
End: 2023-05-30

## 2023-05-30 VITALS
TEMPERATURE: 97.5 F | HEIGHT: 63 IN | SYSTOLIC BLOOD PRESSURE: 120 MMHG | BODY MASS INDEX: 40.75 KG/M2 | WEIGHT: 230 LBS | DIASTOLIC BLOOD PRESSURE: 76 MMHG | HEART RATE: 95 BPM

## 2023-05-30 DIAGNOSIS — M25.562 ACUTE PAIN OF LEFT KNEE: ICD-10-CM

## 2023-05-30 DIAGNOSIS — M17.12 PRIMARY OSTEOARTHRITIS OF LEFT KNEE: Primary | ICD-10-CM

## 2023-05-30 RX ORDER — BUPIVACAINE HYDROCHLORIDE 2.5 MG/ML
2 INJECTION, SOLUTION INFILTRATION; PERINEURAL
Status: COMPLETED | OUTPATIENT
Start: 2023-05-30 | End: 2023-05-30

## 2023-05-30 RX ORDER — TRIAMCINOLONE ACETONIDE 40 MG/ML
40 INJECTION, SUSPENSION INTRA-ARTICULAR; INTRAMUSCULAR
Status: COMPLETED | OUTPATIENT
Start: 2023-05-30 | End: 2023-05-30

## 2023-05-30 RX ADMIN — TRIAMCINOLONE ACETONIDE 40 MG: 40 INJECTION, SUSPENSION INTRA-ARTICULAR; INTRAMUSCULAR at 15:00

## 2023-05-30 RX ADMIN — BUPIVACAINE HYDROCHLORIDE 2 ML: 2.5 INJECTION, SOLUTION INFILTRATION; PERINEURAL at 15:00

## 2023-05-30 NOTE — PATIENT INSTRUCTIONS

## 2023-05-30 NOTE — PROGRESS NOTES
1  Primary osteoarthritis of left knee  Large joint arthrocentesis: L knee      2  Acute pain of left knee  Ambulatory Referral to Orthopedic Surgery    XR knee 4+ vw left injury    Large joint arthrocentesis: L knee        Orders Placed This Encounter   Procedures   • Large joint arthrocentesis: L knee   • XR knee 4+ vw left injury        Imaging Studies (I personally reviewed images in PACS and report):    • X-ray left knee 5/30/2023: No acute osseous abnormalities  Mild medial tibiofemoral osteoarthritic changes as evidenced by mild joint space narrowing, marginal osteophytes  IMPRESSION:  • Acute left knee pain while transitioning from a sitting to a standing position  • Primary osteoarthritis of the right knee    Other factors:  • BMI 40 7  • Patient has a history of polymyalgia rheumatica and has been on a tapering dose of prednisone reportedly since March 2023 likely contributing to her weight gain and likely exacerbating the pain of her left knee    PLAN:    • Clinical exam and radiographic imaging reviewed with patient today, with impression as per above  I have discussed with the patient the pathophysiology of this diagnosis and reviewed how the examination correlates with this diagnosis  • Imaging obtained of her left knee today as noted above  I will follow-up official radiology interpretation  • Treatment options were discussed at length, including risks and benefits; after discussing these treatment options, the patient elected for an intra-articular left knee cortisone injection as per procedure note below  I counseled that we can repeat this injection every 3 months as needed in regards to pain control  Alternatively, we could consider a viscosupplementation injection if she does not sustain at least 3 months of relief from the cortisone    • Furthermore, I recommended use of a compression knee sleeve during activities and as needed use of acetaminophen, NSAIDs, heat/ice therapy 20 "minutes on/off for her knees  • I counseled that her substantial weight gain over the past couple months (from oral prednisone) is likely a contributing factor to the exacerbation of her arthritis  Return if symptoms worsen or fail to improve  Portions of the record may have been created with voice recognition software  Occasional wrong word or \"sound a like\" substitutions may have occurred due to the inherent limitations of voice recognition software  Read the chart carefully and recognize, using context, where substitutions have occurred  CHIEF COMPLAINT:  Chief Complaint   Patient presents with   • Left Knee - Pain         HPI:  Diana Block is a 62 y o  female  who presents for       Visit 5/30/2023:  Initial evaluation of left knee pain:  Of note patient has a history of polymyalgia rheumatica and reportedly has been on a tapering dose of oral prednisone since March, 2023  As result, patient states she has had substantial weight gain over the past several weeks  She reports in regards to her knees, she is always had intermittent aches and pains and swelling of her knee in the past   She states the pain of her left knee worsened on 5/20/2023 while transitioning from sitting to standing  She states she felt a sharp stabbing pain over the medial aspect of her knee and since this incident she continuously has pain when attempting to transition from sitting to standing as well as with prolonged walking  Pain is nonradiating  She reports intermittent crepitus of her knee  Reports intermittent swelling but this is a chronic issue even before injury  She denies any discoloration  While on oral prednisone she does notice decreasing swelling and pain over time but is aggravated due to the exacerbation of pain during normal activities of daily living  She states the pain can interfere with her sleep at night as well  She does not use any bracing      Denies prior surgeries of her left knee in the " "past       Medical, Surgical, Family, and Social History    Past Medical History:   Diagnosis Date   • Anxiety    • High cholesterol    • Hypertension    • Hypothyroid    • 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   • Thyroid cancer ([facility] [address] ) 2016     Past Surgical History:   Procedure Laterality Date   • BACK SURGERY     • BREAST EXCISIONAL BIOPSY Bilateral     benign - over 35 years   • CARPAL TUNNEL RELEASE Right    • ENDOMETRIAL ABLATION     • LAMINECTOMY     • TOTAL VAGINAL HYSTERECTOMY       Social History   Social History     Substance and Sexual Activity   Alcohol Use Yes   • Alcohol/week: 1 0 standard drink of alcohol   • Types: 1 Standard drinks or equivalent per week     Social History     Substance and Sexual Activity   Drug Use Never     Social History     Tobacco Use   Smoking Status Former   Smokeless Tobacco Never   Tobacco Comments    QUIT 2010     Family History   Problem Relation Age of Onset   • No Known Problems Mother    • No Known Problems Father    • No Known Problems Sister    • Liver cancer Sister    • No Known Problems Maternal Grandmother    • No Known Problems Maternal Grandfather    • No Known Problems Paternal Grandmother    • No Known Problems Paternal Grandfather    • No Known Problems Maternal Aunt    • No Known Problems Maternal Aunt    • No Known Problems Maternal Aunt    • No Known Problems Maternal Aunt    • No Known Problems Maternal Aunt      Allergies   Allergen Reactions   • Penicillins Anaphylaxis   • Clarithromycin Hives and Other (See Comments)     GI Upset   • Sulfamethoxazole-Trimethoprim Diarrhea          Physical Exam  /76 (BP Location: Left arm)   Pulse 95   Temp 97 5 °F (36 4 °C) (Temporal)   Ht 5' 3\" (1 6 m)   Wt 104 kg (230 lb)   BMI 40 74 kg/m²     Constitutional:  see vital signs  Gen: Obese, normocephalic/atraumatic, well-groomed  Pulmonary/Chest: Effort normal  No respiratory distress       Ortho Exam  Left knee Exam:  Erythema: no  Swelling: " no  Increased Warmth: no  Tenderness: + MJL  ROM: 0-130  Knee flexion strength: 5/5  Knee extension strength: 5/5  Patellar Grind: negative  Lachman's: negative  Varus laxity: negative  Valgus laxity: negative  Miller: negative     No calf tenderness to palpation    Gait: Antalgic      Large joint arthrocentesis: L knee  Universal Protocol:  Consent: Verbal consent obtained  Risks and benefits: risks, benefits and alternatives were discussed  Consent given by: patient  Patient understanding: patient states understanding of the procedure being performed  Site marked: the operative site was marked  Radiology Images displayed and confirmed   If images not available, report reviewed: imaging studies available  Required items: required blood products, implants, devices, and special equipment available  Patient identity confirmed: verbally with patient    Supporting Documentation  Indications: pain   Procedure Details  Location: knee - L knee  Preparation: Patient was prepped and draped in the usual sterile fashion  Needle size: 22 G  Ultrasound guidance: no  Approach: anterolateral  Medications administered: 40 mg triamcinolone acetonide 40 mg/mL; 2 mL bupivacaine 0 25 %    Patient tolerance: patient tolerated the procedure well with no immediate complications  Dressing:  Sterile dressing applied

## 2023-06-01 ENCOUNTER — CLINICAL SUPPORT (OUTPATIENT)
Dept: FAMILY MEDICINE CLINIC | Facility: CLINIC | Age: 58
End: 2023-06-01

## 2023-06-01 DIAGNOSIS — M35.3 POLYMYALGIA RHEUMATICA (HCC): Primary | ICD-10-CM

## 2023-06-01 NOTE — PROGRESS NOTES
[address]    Aniceto Lanza is a 62 y o  female who was referred to the pharmacist for Polymyalgia rheumatica / steroid taper education and management, referred by KEIRA Ojeda   Telemedicine consent  The patient was identified by name and date of birth  Aniceto Lanza was informed that this is a telemedicine visit and that the visit is being conducted through Telephone  My office door was closed  No one else was in the room  She acknowledged consent and understanding of privacy and security of the video platform  The patient has agreed to participate and understands they can discontinue the visit at any time  Assessment/ Plan       1  Polymyalgia rheumatica    · Following with Rheumatology  · Continues on methotrexate 15 mg weekly and folic acid daily  · Prednisone is being tapered down currently on 7 5 mg daily with plans to decrease to 5 mg daily next week  · A1c and Blood sugar readings have remaining controlled while on steroids    Changes to Medication Regimen:   None - continue to follow medication adjustment per rheumatology  2  Medication Reconciliation:   • Medication list reviewed with pt at today's visit  • Medication list updated to reflect medications pt is currently taking    Monitoring: symptoms of PMR, blood sugar checks daily    Follow-up:   · As needed with clinical pharmacist  · Follow up with rheumatologist in July Subjective 1   Medication Adherence/ Tolerability:  • Following with Rheumatology (last appt 5/23/23)  o Continues on methotrexate 2 5 mg 6 tabs (15 mg total) every week and Folic acid 1 mg daily  o Prednisone- Currently at at 7 5 mg daily x 1 week then 5 mg daily until July    o Per rheumatology, if [address] fails they will try Karla Radar     Objective       ASCVD Risk:  The 10-year ASCVD risk score (Emil FREEMAN, et al , 2019) is: 4 7%    Values used to calculate the score:      Age: 62 years Sex: Female      Is Non- : No      Diabetic: Yes      Tobacco smoker: No      Systolic Blood Pressure: 006 mmHg      Is BP treated: Yes      HDL Cholesterol: 63 mg/dL      Total Cholesterol: 191 mg/dL     Home blood sugar:  Has not checked these past couple weeks due to numbers being controlled  I did advised she checked 2 x weekly while on steroid therapy just to make sure sugars are not trending up       Labs:    Lab Results   Component Value Date    EGFR 88 06/04/2022    MICROALBCRE 18 01/21/2023         Pharmacist Tracking Tool     Pharmacist Tracking Tool  Reason For Outreach: Embedded Pharmacist  Demographics:  Intervention Method: Phone  Type of Intervention: Follow-Up  Topics Addressed: Other  Pharmacologic Interventions: Med Rec  Non-Pharmacologic Interventions: Disease state education and Medication/Device education  Time:  Direct Patient Care: 15 mins  Care Coordination: 5 mins  Recommendation Recipient: Patient/Caregiver and Provider  Outcome: Accepted

## 2023-06-09 PROBLEM — Z00.00 ROUTINE GENERAL MEDICAL EXAMINATION AT A HEALTH CARE FACILITY: Status: RESOLVED | Noted: 2023-04-10 | Resolved: 2023-06-09

## 2023-06-12 ENCOUNTER — RA CDI HCC (OUTPATIENT)
Dept: OTHER | Facility: HOSPITAL | Age: 58
End: 2023-06-12

## 2023-06-12 NOTE — PROGRESS NOTES
[facility]  coding opportunities       Chart reviewed, no opportunity found: CHART REVIEWED, NO OPPORTUNITY FOUND        Patients Insurance        Commercial Insurance: [address] [place] [address]

## 2023-06-16 ENCOUNTER — OFFICE VISIT (OUTPATIENT)
Age: 58
End: 2023-06-16

## 2023-06-16 VITALS
DIASTOLIC BLOOD PRESSURE: 80 MMHG | HEART RATE: 89 BPM | SYSTOLIC BLOOD PRESSURE: 130 MMHG | WEIGHT: 226 LBS | OXYGEN SATURATION: 95 % | BODY MASS INDEX: 40.03 KG/M2

## 2023-06-16 DIAGNOSIS — I10 ESSENTIAL HYPERTENSION: ICD-10-CM

## 2023-06-16 DIAGNOSIS — R21 RASH AND NONSPECIFIC SKIN ERUPTION: ICD-10-CM

## 2023-06-16 DIAGNOSIS — R07.9 CHEST PAIN, UNSPECIFIED TYPE: Primary | ICD-10-CM

## 2023-06-16 PROCEDURE — 99214 OFFICE O/P EST MOD 30 MIN: CPT | Performed by: NURSE PRACTITIONER

## 2023-06-16 RX ORDER — AMLODIPINE BESYLATE 10 MG/1
TABLET ORAL
COMMUNITY
Start: 2023-06-15

## 2023-06-16 RX ORDER — DEXAMETHASONE 0.5 MG/5ML
SOLUTION ORAL
COMMUNITY
Start: 2023-06-12

## 2023-06-16 NOTE — ASSESSMENT & PLAN NOTE
Patient seen last night at [address] [place] [address] for chest pain on the left side of her chest which radiated down her arm  Patient reports labs, chest x-ray, and EKG were all unremarkable  Patient did have elevated blood pressure in the hospital, and reports when she got home she realized that she was not taking her blood pressure medication for over a week  Patient was sent home with amlodipine, however she states the medication made her feel dizzy  Patient is to follow back up in 1 week for a blood pressure check and to calibrate her home blood pressure cuff  Patient is to continue to monitor blood pressure at home and call the office if her blood pressures are consistently running above 130/90  Stress test ordered today  Will consider cardiology if stress test is abnormal   Patient denies regular occurrences of chest pain, shortness of breath on exertion, palpitations, leg swelling  ED precautions discussed with patient  Patient verbalized understanding and is in agreement with plan

## 2023-06-16 NOTE — PATIENT INSTRUCTIONS
Chest Pain   WHAT YOU NEED TO KNOW:   Chest pain can be caused by a range of conditions, from not serious to life-threatening  Chest pain can be a symptom of a digestive problem, such as acid reflux or a stomach ulcer  An anxiety attack or a strong emotion, such as anger, can also cause chest pain  Infection, inflammation, or a fracture in the bones or cartilage in your chest can cause pain or discomfort  Sometimes chest pain or pressure is caused by poor blood flow to your heart (angina)  Chest pain may also be caused by life-threatening conditions such as a heart attack or blood clot in your lungs  DISCHARGE INSTRUCTIONS:   Call your local emergency number (911 in the [address] [facility]) or have someone call if:   You have any of the following signs of a heart attack:      Squeezing, pressure, or pain in your chest    You may  also have any of the following:     Discomfort or pain in your back, neck, jaw, stomach, or arm    Shortness of breath    Nausea or vomiting    Lightheadedness or a sudden cold sweat      Return to the emergency department if:   You have chest discomfort that gets worse, even with medicine  You cough or vomit blood  Your bowel movements are black or bloody  You cannot stop vomiting, or it hurts to swallow  Call your doctor if:   You have questions or concerns about your condition or care  Medicines:   Medicines  may be given to treat the cause of your chest pain  Examples include pain medicine, anxiety medicine, or medicines to increase blood flow to your heart  Do not take certain medicines without asking your healthcare provider first   These include NSAIDs, herbal or vitamin supplements, and hormones, such as estrogen or progestin  Take your medicine as directed  Contact your healthcare provider if you think your medicine is not helping or if you have side effects  Tell your provider if you are allergic to any medicine  Keep a list of the medicines, vitamins, and herbs you take  Include the amounts, and when and why you take them  Bring the list or the pill bottles to follow-up visits  Carry your medicine list with you in case of an emergency  Healthy living tips: If the cause of your chest pain is known, your healthcare provider will give you specific guidelines to follow  The following are general healthy guidelines:  Do not smoke  Nicotine and other chemicals in cigarettes and cigars can cause lung and heart damage  Ask your healthcare provider for information if you currently smoke and need help to quit  E-cigarettes or smokeless tobacco still contain nicotine  Talk to your healthcare provider before you use these products  Choose a variety of healthy foods as often as possible  Include fresh, frozen, or canned fruits and vegetables  Also include low-fat dairy products, fish, chicken (without skin), and lean meats  Your healthcare provider or a dietitian can help you create meal plans  You may need to avoid certain foods or drinks if your pain is caused by a digestion problem  Lower your sodium (salt) intake  Limit foods that are high in sodium, such as canned foods, salty snacks, and cold cuts  If you add salt when you cook food, do not add more at the table  Choose low-sodium canned foods as much as possible  Drink plenty of water every day  Water helps your body to control your temperature and blood pressure  Ask your healthcare provider how much water you should drink every day  Ask about activity  Your healthcare provider will tell you which activities to limit or avoid  Ask when you can drive, return to work, and have sex  Ask about the best exercise plan for you  Maintain a healthy weight  Ask your healthcare provider what a healthy weight is for you  Ask him or her to help you create a safe weight loss plan if you are overweight  Ask about vaccines you may need    Your healthcare provider can tell you which vaccines you need, and when to get them  The following vaccines help prevent diseases that can become serious for a person with a heart condition:    The influenza (flu) vaccine is given each year  Get a flu vaccine as soon as recommended, usually in September or October  The pneumonia vaccine is usually given every 5 years  Your healthcare provider may recommend the pneumonia vaccine if you are 72 or older  COVID-19 vaccines are given to adults as a shot in 1 or 2 doses  Vaccination is recommended for all adults  A booster (additional) dose is also recommended for all adults  A second booster is recommended for all adults 48 or older and for immunocompromised adults 25 or older  The second booster is also recommended for adults who received the 1-dose vaccine for the first and booster doses  Your healthcare provider can tell you when to get one or both boosters  Follow up with your doctor within 72 hours, or as directed: You may need to return for more tests to find the cause of your chest pain  You may be referred to a specialist, such as a cardiologist or gastroenterologist  Write down your questions so you remember to ask them during your visits  © Copyright [employer] 2022 Information is for End User's use only and may not be sold, redistributed or otherwise used for commercial purposes  The above information is an  only  It is not intended as medical advice for individual conditions or treatments  Talk to your doctor, nurse or pharmacist before following any medical regimen to see if it is safe and effective for you  Hypertension   WHAT YOU NEED TO KNOW:   Hypertension is high blood pressure  Your blood pressure is the force of your blood moving against the walls of your arteries  Hypertension causes your blood pressure to get so high that your heart has to work much harder than normal  This can damage your heart   Hypertension that does not respond to medicines and lifestyle changes is called resistant hypertension  Hypertension is considered chronic when it continues for 3 months or longer  DISCHARGE INSTRUCTIONS:   Call your local emergency number (911 in the [address] [facility]) or have someone call if:   You have chest pain  You have any of the following signs of a heart attack:      Squeezing, pressure, or pain in your chest    You may  also have any of the following:     Discomfort or pain in your back, neck, jaw, stomach, or arm    Shortness of breath    Nausea or vomiting    Lightheadedness or a sudden cold sweat    You become confused or have trouble speaking  You suddenly feel lightheaded or have trouble breathing  Return to the emergency department if:   You have a severe headache or vision loss  You have weakness in an arm or leg  Call your doctor or cardiologist if:   You feel faint, dizzy, confused, or drowsy  You have been taking your blood pressure medicine but your pressure is higher than your provider says it should be  You have questions or concerns about your condition or care  Medicines: You may  need any of the following:  Antihypertensives  may be used to help lower your blood pressure  Several kinds of medicines are available  Your healthcare provider will choose medicines based on the kind of hypertension you have  You may need more than one type of medicine  Take the medicine exactly as directed  Diuretics  help decrease extra fluid that collects in your body  This will help lower your blood pressure  You may urinate more often while you take this medicine  Cholesterol medicine  helps lower your cholesterol level  A low cholesterol level helps prevent heart disease and makes it easier to control your blood pressure  Take your medicine as directed  Contact your healthcare provider if you think your medicine is not helping or if you have side effects  Tell your provider if you are allergic to any medicine  Keep a list of the medicines, vitamins, and herbs you take  Include the amounts, and when and why you take them  Bring the list or the pill bottles to follow-up visits  Carry your medicine list with you in case of an emergency  Follow up with your doctor or cardiologist as directed: You will need to return to have your blood pressure checked and to have other lab tests done  Write down your questions so you remember to ask them during your visits  Stages of hypertension:  Your healthcare provider will give you a blood pressure goal based on your age, health, and risk for cardiovascular disease  The following are general guidelines on the stages of hypertension:  Normal blood pressure is 119/79 or lower   Your healthcare provider may only check your blood pressure each year if it stays at a normal level  Elevated blood pressure is 120/79 to 129/79   This is sometimes called prehypertension  Your healthcare provider may suggest lifestyle changes to help lower your blood pressure to a normal level  He or she may then check it again in 3 to 6 months  Stage 1 hypertension is 130/80  to 139/89   Your provider may recommend lifestyle changes, medication, and checks every 3 to 6 months until your blood pressure is controlled  Stage 2 hypertension is 140/90 or higher   Your provider will recommend lifestyle changes and have you take 2 kinds of hypertension medicines  You will also need to have your blood pressure checked monthly until it is controlled  Manage hypertension:   Check your blood pressure at home  Do not smoke, have caffeine, or exercise for at least 30 minutes before you check your blood pressure  Sit and rest for 5 minutes before you check your blood pressure  Extend your arm and support it on a flat surface  Your arm should be at the same level as your heart  Follow the directions that came with your blood pressure monitor  Check your blood pressure 2 times, 1 minute apart, before you take your medicine in the morning   Also check your blood pressure before your evening meal  Keep a record of your readings and bring it to your follow-up visits  Ask your healthcare provider what your blood pressure should be  Manage any other health conditions you have  Health conditions such as diabetes can increase your risk for hypertension  Follow your healthcare provider's instructions and take all your medicines as directed  Ask about all medicines  Certain medicines can increase your blood pressure  Examples include oral birth control pills, decongestants, herbal supplements, and NSAIDs, such as ibuprofen  Your healthcare provider can tell you which medicines are safe for you to take  This includes prescription and over-the-counter medicines  Lifestyle changes you can make to manage hypertension:  Your healthcare provider may recommend you work with a team to manage hypertension  The team may include medical experts such as a dietitian, an exercise or physical therapist, and a behavior therapist  Your family members may be included in helping you create lifestyle changes  Limit sodium (salt) as directed  Too much sodium can affect your fluid balance  Check labels to find low-sodium or no-salt-added foods  Some low-sodium foods use potassium salts for flavor  Too much potassium can also cause health problems  Your healthcare provider will tell you how much sodium and potassium are safe for you to have in a day  He or she may recommend that you limit sodium to 2,300 mg a day  Follow the meal plan recommended by your healthcare provider  A dietitian or your provider can give you more information on low-sodium plans or the DASH (Dietary Approaches to Stop Hypertension) eating plan  The DASH plan is low in sodium, processed sugar, unhealthy fats, and total fat  It is high in potassium, calcium, and fiber  These can be found in vegetables, fruit, and whole-grain foods  Be physically active throughout the day    Physical activity, such as exercise, can help control your blood pressure and your weight  Be physically active for at least 30 minutes per day, on most days of the week  Include aerobic activity, such as walking or riding a bicycle  Also include strength training at least 2 times each week  Your healthcare providers can help you create a physical activity plan  Decrease stress  This may help lower your blood pressure  Learn ways to relax, such as deep breathing or listening to music  Limit alcohol as directed  Alcohol can increase your blood pressure  A drink of alcohol is 12 ounces of beer, 5 ounces of wine, or 1½ ounces of liquor  Do not smoke  Nicotine and other chemicals in cigarettes and cigars can increase your blood pressure and also cause lung damage  Ask your healthcare provider for information if you currently smoke and need help to quit  E-cigarettes or smokeless tobacco still contain nicotine  Talk to your healthcare provider before you use these products  © Copyright Sameul Jun 2022 Information is for End User's use only and may not be sold, redistributed or otherwise used for commercial purposes  The above information is an  only  It is not intended as medical advice for individual conditions or treatments  Talk to your doctor, nurse or pharmacist before following any medical regimen to see if it is safe and effective for you

## 2023-06-16 NOTE — PROGRESS NOTES
Name: Christian Liriano      : 1965      MRN: [id number]  Encounter Provider: KEIRA Dempsey  Encounter Date: 2023   Encounter department: [address] [place] [address] WITH [facility]    Assessment & Plan     1  Chest pain, unspecified type  Assessment & Plan:  Patient seen last night at [address] [place] [address] for chest pain on the left side of her chest which radiated down her arm  Patient reports labs, chest x-ray, and EKG were all unremarkable  Patient did have elevated blood pressure in the hospital, and reports when she got home she realized that she was not taking her blood pressure medication for over a week  Patient was sent home with amlodipine, however she states the medication made her feel dizzy  Patient is to follow back up in 1 week for a blood pressure check and to calibrate her home blood pressure cuff  Patient is to continue to monitor blood pressure at home and call the office if her blood pressures are consistently running above 130/90  Stress test ordered today  Will consider cardiology if stress test is abnormal   Patient denies regular occurrences of chest pain, shortness of breath on exertion, palpitations, leg swelling  ED precautions discussed with patient  Patient verbalized understanding and is in agreement with plan  Orders:  -     Stress test only, exercise; Future; Expected date: 2023    2  Rash and nonspecific skin eruption  Assessment & Plan:  Small red macule approximately half an inch by half an inch noted on the right side of the face next to patient's right side of her upper lip  No dryness, raised edges, drainage, excoriation, noted  Rash looks consistent with sun damage, however will refer to dermatology at this time for further evaluation  Orders:  -     Ambulatory Referral to Dermatology; Future    3   Essential hypertension  Assessment & Plan:  Patient seen last night at [facility] for chest pain on the left side of her chest which radiated down her arm  Patient reports labs, chest x-ray, and EKG were all unremarkable  Patient did have elevated blood pressure in the hospital, and reports when she got home she realized that she was not taking her blood pressure medication for over a week  Patient was sent home with amlodipine, however she states the medication made her feel dizzy  Patient is to follow back up in 1 week for a blood pressure check and to calibrate her home blood pressure cuff  Patient is to continue to monitor blood pressure at home and call the office if her blood pressures are consistently running above 130/90  Stress test ordered today  Will consider cardiology if stress test is abnormal   Patient denies regular occurrences of chest pain, shortness of breath on exertion, palpitations, leg swelling  ED precautions discussed with patient  Patient verbalized understanding and is in agreement with plan  Discussed low salt diet, increasing exercise to 30 mins 3-4x a week  Check home BP and record for next visit  BP goal <140/90  Discussed ER precautions for chest pain, stroke precautions, or BP of 180/120 or greater (hypertensive crisis), change in LOC or worsening symptoms  Call with new or worsening symptoms  If you have numbness, tingling, weakness, or severe headache with elevated BP go to the ED  Subjective      Patient here today with concerns of rash on the right side of her face near her mouth which has been present for over a year, but lightened during the winter months and she feels that it has darkened with sun exposure  Patient denies new face products, pruritus of the area, raised lesions, drainage from the area  Patient reports that she was in the ER yesterday for chest pain  Patient went to [facility] ER and had labs, x-ray, and chest x-ray which were all unremarkable    Patient's blood pressure was elevated in the ER, and patient left ER with a prescription for amlodipine  Patient reports when she went home she realized she has not been taking her blood pressure medications for over a week, and states the amlodipine made her feel dizzy  Patient feels her blood pressure was elevated due to not taking blood pressure medications for over a week  Patient reports blood pressure at home has been in the 120s over 80s  Patient denies headaches, chest pain today, shortness of breath, blurred vision, shortness of breath with exertion  Patient denies history of chest pain  Patient does not currently follow-up with cardiology  Review of Systems   Constitutional: Negative  HENT: Negative  Eyes: Negative  Respiratory: Negative  Cardiovascular: Negative  Gastrointestinal: Negative  Endocrine: Negative  Genitourinary: Negative  Musculoskeletal: Negative  Skin: Negative  Allergic/Immunologic: Negative  Neurological: Negative  Hematological: Negative  Psychiatric/Behavioral: Negative          Current Outpatient Medications on File Prior to Visit   Medication Sig   • amLODIPine (NORVASC) 10 mg tablet    • atorvastatin (LIPITOR) 80 mg tablet Take 1 tablet (80 mg total) by mouth daily   • Calcium Carb-Cholecalciferol (Calcium/Vitamin D) 600-400 MG-UNIT TABS Take 1 tablet by mouth 2 (two) times a day   • dexamethasone 0 5 mg/5 mL solution SWISH AND SPIT 10 ML BY MOUTH TWICE A DAY UNTIL FINISHED   • diphenhydrAMINE (BENADRYL) 25 mg tablet    • Farxiga 10 MG tablet Take 1 tablet (10 mg total) by mouth daily   • fexofenadine (ALLEGRA) 180 MG tablet Take 180 mg by mouth daily PRN   • fluticasone (FLONASE) 50 mcg/act nasal spray    • folic acid (FOLVITE) 1 mg tablet Take 1,000 mcg by mouth daily   • hydrochlorothiazide (HYDRODIURIL) 25 mg tablet Take 12 5 mg by mouth daily   • hydrOXYzine HCL (ATARAX) 10 mg tablet Take 1 tablet (10 mg total) by mouth every 6 (six) hours as needed for anxiety   • levothyroxine 150 mcg tablet Take 1 tablet (150 mcg total) by mouth daily   • lisinopril (ZESTRIL) 40 mg tablet Take 1 tablet (40 mg total) by mouth daily   • Magnesium 400 MG CAPS Take 1 each by mouth daily   • methotrexate 2 5 MG tablet Take 6 tablets by mouth once a week   • Misc Natural Products (FIBER SUPREME PO) Take 1 Scoop by mouth daily   • Omega-3 Fatty Acids (Fish Oil) 1200 MG CAPS Take 1 capsule by mouth in the morning   • omeprazole (PriLOSEC) 20 mg delayed release capsule Take 1 capsule (20 mg total) by mouth daily   • predniSONE 5 mg tablet Take 1 tablet (5 mg total) by mouth daily with breakfast AND 1 tablet (5 mg total) daily with lunch  (Patient taking differently: 7 5 mg daily for 1 weeks then 5 mg daily until July )   • Probiotic Product (CULTURELLE PROBIOTICS PO) Take 1 each by mouth daily   • sertraline (ZOLOFT) 100 mg tablet Take 2 tablets (200 mg total) by mouth daily   • Vitamin B Complex-C CAPS Take 1 each by mouth in the morning       Objective     /80 (BP Location: Right arm, Patient Position: Sitting, Cuff Size: Standard)   Pulse 89   Wt 103 kg (226 lb)   SpO2 95%   BMI 40 03 kg/m²     Physical Exam  Constitutional:       General: She is not in acute distress  Appearance: Normal appearance  HENT:      Head: Normocephalic and atraumatic  Right Ear: External ear normal       Left Ear: External ear normal       Nose: Nose normal    Eyes:      General:         Right eye: No discharge  Left eye: No discharge  Conjunctiva/sclera: Conjunctivae normal    Cardiovascular:      Rate and Rhythm: Normal rate and regular rhythm  Heart sounds: Normal heart sounds  No murmur heard  Pulmonary:      Effort: Pulmonary effort is normal       Breath sounds: Normal breath sounds  Abdominal:      General: Bowel sounds are normal       Palpations: Abdomen is soft  Musculoskeletal:         General: Normal range of motion  Cervical back: Normal range of motion        Right lower leg: No edema  Left lower leg: No edema  Lymphadenopathy:      Cervical: No cervical adenopathy  Skin:     General: Skin is warm and dry  Comments: Small red macule approximately half an inch by half an inch noted on the right side of the face next to patient's right side of her upper lip  No dryness, raised edges, drainage, excoriation, noted  Neurological:      Mental Status: She is alert and oriented to person, place, and time  Psychiatric:         Mood and Affect: Mood normal          Behavior: Behavior normal          Thought Content:  Thought content normal          Judgment: Judgment normal        KEIRA Acosta

## 2023-06-16 NOTE — ASSESSMENT & PLAN NOTE
Patient seen last night at [address] [place] [address] for chest pain on the left side of her chest which radiated down her arm  Patient reports labs, chest x-ray, and EKG were all unremarkable  Patient did have elevated blood pressure in the hospital, and reports when she got home she realized that she was not taking her blood pressure medication for over a week  Patient was sent home with amlodipine, however she states the medication made her feel dizzy  Patient is to follow back up in 1 week for a blood pressure check and to calibrate her home blood pressure cuff  Patient is to continue to monitor blood pressure at home and call the office if her blood pressures are consistently running above 130/90  Stress test ordered today  Will consider cardiology if stress test is abnormal   Patient denies regular occurrences of chest pain, shortness of breath on exertion, palpitations, leg swelling  ED precautions discussed with patient  Patient verbalized understanding and is in agreement with plan  Discussed low salt diet, increasing exercise to 30 mins 3-4x a week  Check home BP and record for next visit  BP goal <140/90  Discussed ER precautions for chest pain, stroke precautions, or BP of 180/120 or greater (hypertensive crisis), change in LOC or worsening symptoms  Call with new or worsening symptoms  If you have numbness, tingling, weakness, or severe headache with elevated BP go to the ED

## 2023-06-16 NOTE — ASSESSMENT & PLAN NOTE
Small red macule approximately half an inch by half an inch noted on the right side of the face next to patient's right side of her upper lip  No dryness, raised edges, drainage, excoriation, noted  Rash looks consistent with sun damage, however will refer to dermatology at this time for further evaluation

## 2023-06-30 ENCOUNTER — HOSPITAL ENCOUNTER (OUTPATIENT)
Dept: NON INVASIVE DIAGNOSTICS | Facility: HOSPITAL | Age: 58
Discharge: HOME | End: 2023-06-30
Payer: COMMERCIAL

## 2023-06-30 DIAGNOSIS — R07.9 CHEST PAIN, UNSPECIFIED TYPE: ICD-10-CM

## 2023-06-30 LAB
CHEST PAIN STATEMENT: NORMAL
MAX DIASTOLIC BP: 70 MMHG
MAX HEART RATE: 151 BPM
MAX HR PERCENT: 92 %
MAX HR: 151 BPM
MAX PREDICTED HEART RATE: 163 BPM
MAX. SYSTOLIC BP: 218 MMHG
PROTOCOL NAME: NORMAL
RATE PRESSURE PRODUCT: NORMAL
SL CV STRESS RECOVERY BP: NORMAL MMHG
SL CV STRESS RECOVERY HR: 102 BPM
SL CV STRESS RECOVERY O2 SAT: 96 %
STRESS ANGINA INDEX: 0
STRESS BASELINE BP: NORMAL MMHG
STRESS BASELINE HR: 94 BPM
STRESS O2 SAT REST: 96 %
STRESS PEAK HR: 151 BPM
STRESS POST ESTIMATED WORKLOAD: 7 METS
STRESS POST EXERCISE DUR MIN: 4 MIN
STRESS POST EXERCISE DUR SEC: 21 SEC
STRESS POST O2 SAT PEAK: 96 %
STRESS POST PEAK BP: 218 MMHG
TARGET HR FORMULA: NORMAL
TEST INDICATION: NORMAL
TIME IN EXERCISE PHASE: NORMAL

## 2023-06-30 PROCEDURE — 93017 CV STRESS TEST TRACING ONLY: CPT

## 2023-07-10 RX ORDER — CALCIUM CARBONATE/VITAMIN D3 500-10/5ML
LIQUID (ML) ORAL
COMMUNITY
Start: 2023-06-15

## 2023-07-14 ENCOUNTER — HOSPITAL ENCOUNTER (OUTPATIENT)
Dept: RADIOLOGY | Facility: CLINIC | Age: 58
End: 2023-07-14
Payer: COMMERCIAL

## 2023-07-14 VITALS — BODY MASS INDEX: 39.69 KG/M2 | HEIGHT: 63 IN | WEIGHT: 224 LBS

## 2023-07-14 DIAGNOSIS — Z12.31 ENCOUNTER FOR SCREENING MAMMOGRAM FOR BREAST CANCER: ICD-10-CM

## 2023-07-14 PROCEDURE — 77063 BREAST TOMOSYNTHESIS BI: CPT

## 2023-07-14 PROCEDURE — 77067 SCR MAMMO BI INCL CAD: CPT

## 2023-07-20 ENCOUNTER — TELEMEDICINE (OUTPATIENT)
Dept: FAMILY MEDICINE CLINIC | Facility: CLINIC | Age: 58
End: 2023-07-20

## 2023-07-20 ENCOUNTER — RA CDI HCC (OUTPATIENT)
Dept: OTHER | Facility: HOSPITAL | Age: 58
End: 2023-07-20

## 2023-07-20 DIAGNOSIS — R53.83 OTHER FATIGUE: ICD-10-CM

## 2023-07-20 DIAGNOSIS — R06.83 SNORING: Primary | ICD-10-CM

## 2023-07-20 PROCEDURE — 99213 OFFICE O/P EST LOW 20 MIN: CPT | Performed by: NURSE PRACTITIONER

## 2023-07-20 NOTE — ASSESSMENT & PLAN NOTE
Patient made virtual apt to discuss concerns for snoring and fatigue. She reports feeling un refreshed after 8 hours of sleep. She would like a referral to sleep med. Referral placed. Patient verbalized understanding and is in agreement with plan.

## 2023-07-20 NOTE — PROGRESS NOTES
Name: Yennifer Lewis      : 1965      MRN: [id number]  Encounter Provider: KEIRA George  Encounter Date: 2023   Encounter department: [facility] IN PARTNERSHIP WITH [facility]    Assessment & Plan     1. Snoring  Assessment & Plan:  Patient made virtual apt to discuss concerns for snoring and fatigue. She reports feeling un refreshed after 8 hours of sleep. She would like a referral to sleep med. Referral placed. Patient verbalized understanding and is in agreement with plan. Orders:  -     Ambulatory Referral to Pulmonology; Future    2. Other fatigue  Assessment & Plan:  Patient made virtual apt to discuss concerns for snoring and fatigue. She reports feeling un refreshed after 8 hours of sleep. She would like a referral to sleep med. Referral placed. Patient verbalized understanding and is in agreement with plan. Orders:  -     Ambulatory Referral to Pulmonology; Future           Subjective      Virtual Brief Visit  Patient made virtual apt to discuss concerns for snoring and fatigue. Patient states her  noted that patient snores loudly during the nights. Patient reports she is un refreshed when she wakes up in the morning. She reports she goes to bed at 830pm and wakes up at 4am feeling unrefreshed. She wakes up to go to the bathroom twice nightly. She would like a referral for a sleep study at this time. This Visit is being completed by telephone. The Patient is located at Home and in the following state in which I hold an active license [place]    The patient was identified by name and date of birth. Yennifer Lewis was informed that this is a telemedicine visit and that the visit is being conducted through the [email or web address]. She agrees to proceed. .  My office door was closed. No one else was in the room. She acknowledged consent and understanding of privacy and security of the video platform.  The patient has agreed to participate and understands they can discontinue the visit at any time. Patient is aware this is a billable service. Assessment/Plan:    Problem List Items Addressed This Visit    None    This SmartLink is not supported for plain text fields. Visit Time  Total Visit Duration: 15              Review of Systems   Constitutional: Positive for fatigue. Negative for activity change, appetite change, chills, diaphoresis, fever and unexpected weight change. HENT: Negative. Eyes: Negative. Respiratory: Negative. Cardiovascular: Negative. Gastrointestinal: Negative. Genitourinary: Negative. Musculoskeletal: Negative. Skin: Negative. Neurological: Negative. Psychiatric/Behavioral: Negative.         Current Outpatient Medications on File Prior to Visit   Medication Sig   • Ascorbic Acid (VITAMIN C PO)   Start: 06/15/23 10:32:00 EDT, PO, 1 Unknown, 1 Refill(s), Take 1 each by mouth in the morning   • atorvastatin (LIPITOR) 80 mg tablet Take 1 tablet (80 mg total) by mouth daily   • B Complex Vitamins (VITAMIN B COMPLEX PO)   Start: 06/15/23 10:34:00 EDT, PO, 1 Unknown, 1 Refill(s), Take 1 each by mouth in the morning   • Bacillus Coagulans-Inulin (PROBIOTIC FORMULA PO)   Start: 06/15/23 10:34:00 EDT, PO, 1 Unknown, 1 Refill(s), Take 1 each by mouth daily   • Calcium Carb-Cholecalciferol (Calcium/Vitamin D) 600-400 MG-UNIT TABS Take 1 tablet by mouth 2 (two) times a day   • Calcium Citrate-Vitamin D 250-5 MG-MCG TABS   Start: 06/15/23 10:31:00 EDT, PO, 1 Unknown, 1 Refill(s), Take 1 tablet by mouth 2 (two) times a day   • diphenhydrAMINE (BENADRYL) 25 mg tablet    • Farxiga 10 MG tablet Take 1 tablet (10 mg total) by mouth daily   • fexofenadine (ALLEGRA) 180 MG tablet Take 180 mg by mouth daily PRN   • fluticasone (FLONASE) 50 mcg/act nasal spray    • folic acid (FOLVITE) 1 mg tablet Take 1,000 mcg by mouth daily   • hydrochlorothiazide (HYDRODIURIL) 25 mg tablet Take 12.5 mg by mouth daily   • hydrOXYzine HCL (ATARAX) 10 mg tablet Take 1 tablet (10 mg total) by mouth every 6 (six) hours as needed for anxiety   • levothyroxine 150 mcg tablet Take 1 tablet (150 mcg total) by mouth daily   • lisinopril (ZESTRIL) 40 mg tablet Take 1 tablet (40 mg total) by mouth daily   • Magnesium 400 MG CAPS Take 1 each by mouth daily   • Magnesium Oxide 400 MG CAPS   Start: 06/15/23 10:33:00 EDT, PO, 1 Unknown, 1 Refill(s), Take 1 each by mouth daily   • methotrexate 2.5 MG tablet Take 6 tablets by mouth once a week   • Misc Natural Products (FIBER SUPREME PO) Take 1 Scoop by mouth daily   • Omega-3 Fatty Acids (Fish Oil) 1200 MG CAPS Take 1 capsule by mouth in the morning   • Omega-3 Fatty Acids (OMEGA 3 PO)   Start: 06/15/23 10:34:00 EDT, PO, 1 Unknown, 1 Refill(s), Take 1 capsule by mouth in the morning   • omeprazole (PriLOSEC) 20 mg delayed release capsule Take 1 capsule (20 mg total) by mouth daily   • predniSONE 5 mg tablet Take 1 tablet (5 mg total) by mouth daily with breakfast AND 1 tablet (5 mg total) daily with lunch. (Patient taking differently: 7.5 mg daily for 1 weeks then 5 mg daily until July )   • Probiotic Product (CULTURELLE PROBIOTICS PO) Take 1 each by mouth daily   • sertraline (ZOLOFT) 100 mg tablet Take 2 tablets (200 mg total) by mouth daily   • Vitamin B Complex-C CAPS Take 1 each by mouth in the morning       Objective     There were no vitals taken for this visit. Physical Exam  Constitutional:       Appearance: Normal appearance. HENT:      Head: Normocephalic and atraumatic. Eyes:      General:         Right eye: No discharge. Left eye: No discharge. Conjunctiva/sclera: Conjunctivae normal.   Pulmonary:      Effort: No respiratory distress. Neurological:      Mental Status: She is alert and oriented to person, place, and time. Psychiatric:         Mood and Affect: Mood normal.         Behavior: Behavior normal.         Thought Content:  Thought content normal. Judgment: Judgment normal.       Uma Maldonado, [address] [place] [address]

## 2023-07-20 NOTE — PROGRESS NOTES
[facility] coding opportunities       Chart reviewed, no opportunity found: CHART REVIEWED, NO OPPORTUNITY FOUND     Patients Insurance      Commercial Insurance: Arnoldo Garcia

## 2023-07-20 NOTE — PATIENT INSTRUCTIONS
Sleep Apnea   AMBULATORY CARE:   Sleep apnea  is a condition that causes you to stop breathing often during sleep. Types of sleep apnea:   Obstructive sleep apnea (ADELAIDA)  is the most common kind. The muscles and tissues around your throat relax and block air from passing through. Obesity, use of alcohol or cigarettes, or a family history are common causes. ADELAIDA may increase your risk for complications after surgery. Central sleep apnea (CSA)  means your brain does not send signals to the muscles that control breathing. You do not take a breath even though your airway is open. Common causes include medical conditions such as heart failure, being older than 40, or use of opioids. Complex (or mixed) sleep apnea  means you have both obstructive and central sleep apnea. Common signs and symptoms:   Loud snoring or long pauses in breathing    Feeling sleepy, slow, and tired during the day    Snorting, gasping, or choking while you sleep, and waking up suddenly because of these    Feeling irritable during the day    Dry mouth or a headache in the mornings    Heavy night sweating    A hard time thinking, remembering things, or focusing on your tasks the following day    Call your local emergency number (911 in the [address]) if:   You have chest pain or trouble breathing. Call your doctor if:   You have new or worsening signs or symptoms. You have questions or concerns about your condition or care. Treatment  depends on the kind of apnea you have. A mouth device  may be needed if you have mild sleep apnea. These are designed to keep your throat open. Ask your dentist or healthcare provider about the best mouth device for you. A machine  may be used to help you get more air during sleep. A mask may be placed over your nose and mouth, or just your nose. The mask is hooked to the machine. You will get air through the mask.     A continuous positive airway pressure (CPAP) machine  is used to keep your airway open during sleep. The machine blows a gentle stream of air into the mask when you breathe. This helps keep your airway open so you can breathe more regularly. Extra oxygen may be given through the machine. A bilevel positive airway pressure (BiPAP) machine  gives air but lowers the pressure when you breathe out. An adaptive servo-ventilator (ASV)  is a machine that learns your usual breathing pattern. Then, it uses pressure to give you air and prevent stops in your breathing. Surgery  to expand your airway or remove extra tissues may be needed. Surgery is usually only considered if other treatments do not work. Manage or prevent sleep apnea:   Reach and maintain a healthy weight. Ask your healthcare provider what a healthy weight is for you. Ask your provider to help you create a safe weight loss plan if you are overweight. Even a small goal of a 10% weight loss can improve your symptoms. Do not smoke. Nicotine and other chemicals in cigarettes and cigars can cause lung damage. Ask your healthcare provider for information if you currently smoke and need help to quit. E-cigarettes or smokeless tobacco still contain nicotine. Talk to your healthcare provider before you use these products. Do not drink alcohol or take sedative medicine before you go to sleep. Alcohol and sedatives can relax the muscles and tissues around your throat. This can block the airflow to your lungs. Sleep on your side or use pillows designed to prevent sleep apnea. This prevents your tongue or other tissues from blocking your throat. You can also raise the head of your bed. Follow up with your doctor or specialist as directed: You may need to have blood tests during your follow-up visits. Work with your provider to find the right breathing support equipment and settings for you. Write down your questions so you remember to ask them during your visits.   © Copyright [employer] 2022 Information is for End User's use only and may not be sold, redistributed or otherwise used for commercial purposes. The above information is an  only. It is not intended as medical advice for individual conditions or treatments. Talk to your doctor, nurse or pharmacist before following any medical regimen to see if it is safe and effective for you.

## 2023-07-25 RX ORDER — PREDNISONE 5 MG/1
TABLET ORAL
COMMUNITY
Start: 2023-06-15 | End: 2023-07-28 | Stop reason: ALTCHOICE

## 2023-07-28 ENCOUNTER — OFFICE VISIT (OUTPATIENT)
Age: 58
End: 2023-07-28

## 2023-07-28 VITALS
BODY MASS INDEX: 40.26 KG/M2 | DIASTOLIC BLOOD PRESSURE: 86 MMHG | TEMPERATURE: 98.2 F | HEART RATE: 95 BPM | WEIGHT: 227.2 LBS | OXYGEN SATURATION: 97 % | SYSTOLIC BLOOD PRESSURE: 126 MMHG | HEIGHT: 63 IN

## 2023-07-28 DIAGNOSIS — C73 THYROID CANCER (HCC): ICD-10-CM

## 2023-07-28 DIAGNOSIS — R07.9 CHEST PAIN, UNSPECIFIED TYPE: ICD-10-CM

## 2023-07-28 DIAGNOSIS — E11.9 TYPE 2 DIABETES MELLITUS WITHOUT COMPLICATION, WITHOUT LONG-TERM CURRENT USE OF INSULIN (HCC): ICD-10-CM

## 2023-07-28 DIAGNOSIS — F41.9 ANXIETY: ICD-10-CM

## 2023-07-28 DIAGNOSIS — I10 ESSENTIAL HYPERTENSION: Primary | ICD-10-CM

## 2023-07-28 DIAGNOSIS — M35.3 POLYMYALGIA RHEUMATICA (HCC): ICD-10-CM

## 2023-07-28 DIAGNOSIS — E66.9 OBESITY (BMI 30-39.9): ICD-10-CM

## 2023-07-28 DIAGNOSIS — R06.83 SNORING: ICD-10-CM

## 2023-07-28 PROBLEM — Z11.4 SCREENING FOR HIV (HUMAN IMMUNODEFICIENCY VIRUS): Status: RESOLVED | Noted: 2023-04-10 | Resolved: 2023-07-28

## 2023-07-28 LAB — SL AMB POCT HEMOGLOBIN AIC: 5.9 (ref ?–6.5)

## 2023-07-28 PROCEDURE — 83036 HEMOGLOBIN GLYCOSYLATED A1C: CPT | Performed by: NURSE PRACTITIONER

## 2023-07-28 PROCEDURE — 99214 OFFICE O/P EST MOD 30 MIN: CPT | Performed by: NURSE PRACTITIONER

## 2023-07-28 NOTE — ASSESSMENT & PLAN NOTE
Patient currently following with endocrinology. A1c well controlled today. Referral placed to complex care management.    Lab Results   Component Value Date    HGBA1C 5.9 07/28/2023

## 2023-07-28 NOTE — ASSESSMENT & PLAN NOTE
BP well controlled in office today. Patient currently taking Lisinopril and hctz. Patient did bring in her BP cuff for calibration. Home BP cuff is running slightly higher then office BP cuff. Patient reports home BP's are running 120-130/80/s. She denies current chest pain, change in vision or headaches. Discussed low salt diet, increasing exercise to 30 mins 3-4x a week. Check home BP and record for next visit. BP goal <140/90. Discussed ER precautions for chest pain, stroke precautions, or BP of 180/120 or greater (hypertensive crisis), change in LOC or worsening symptoms. Call with new or worsening symptoms. If you have numbness, tingling, weakness, or severe headache with elevated BP go to the ED.

## 2023-07-28 NOTE — ASSESSMENT & PLAN NOTE
Follow Leslie arthritis. She increased prednisone by 2.5 mg for the next 2 weeks for a flair. She increased methotrexate as well.

## 2023-07-28 NOTE — ASSESSMENT & PLAN NOTE
Patient reports chest pain has resolved. Her stress test was unremarkable. Declines cardiology referral at this time. Go to ED with any new chest pain.

## 2023-07-28 NOTE — ASSESSMENT & PLAN NOTE
Referral placed to complex care management. Goal to consume 500 to 1,000 fewer calories per day for a 1-2 lbs weight loss per week. Increase exercise to 30 min, 5 times a week as tolerated for a goal of 150 mins a week. Calorie tracking apps such as myfitness pal can be helpful for keeping track of calorie intake. Decrease simple carbohydrates (white bread, pasta, white rice), and increasing vegetables, fruit, and protein. Increase water.

## 2023-07-28 NOTE — ASSESSMENT & PLAN NOTE
Patient currently taking Zoloft 100mg and Atarax as needed. She is interested in [address] [place] [address] services at this time to process and cope with multiple health diagnoses. Referral placed. Patient denies SI/HI.

## 2023-07-28 NOTE — PROGRESS NOTES
Name: Joceline Reyes      : 1965      MRN: [id number]  Encounter Provider: KEIRA Morales  Encounter Date: 2023   Encounter department: [address] [place] [address] WITH [facility]    Assessment & Plan     1. Essential hypertension  Assessment & Plan:  BP well controlled in office today. Patient currently taking Lisinopril and hctz. Patient did bring in her BP cuff for calibration. Home BP cuff is running slightly higher then office BP cuff. Patient reports home BP's are running 120-130/80/s. She denies current chest pain, change in vision or headaches. Discussed low salt diet, increasing exercise to 30 mins 3-4x a week. Check home BP and record for next visit. BP goal <140/90. Discussed ER precautions for chest pain, stroke precautions, or BP of 180/120 or greater (hypertensive crisis), change in LOC or worsening symptoms. Call with new or worsening symptoms. If you have numbness, tingling, weakness, or severe headache with elevated BP go to the ED. 2. Anxiety  Assessment & Plan:  Patient currently taking Zoloft 100mg and Atarax as needed. She is interested in [address] [place] [address] services at this time to process and cope with multiple health diagnoses. Referral placed. Patient denies SI/HI. Orders:  -     Ambulatory Referral to [facility]; Future    3. Polymyalgia rheumatica ([address])  Assessment & Plan:  Follow Emkey arthritis. She increased prednisone by 2.5 mg for the next 2 weeks for a flair. She increased methotrexate as well. 4. Thyroid cancer [facility])  Assessment & Plan:  Endocrinology reports no signs of active disease. Following Shannan Gutierrez endocrinology. 5. Type 2 diabetes mellitus without complication, without long-term current use of insulin [facility])  Assessment & Plan:  Patient currently following with endocrinology. A1c well controlled today. Referral placed to complex care management.    Lab Results   Component Value Date    HGBA1C 5.9 2023 Orders:  -     Ambulatory Referral to Complex Care Management Program; Future  -     POCT hemoglobin A1c    6. Obesity (BMI 30-39. 9)  Assessment & Plan:  Referral placed to complex care management. Goal to consume 500 to 1,000 fewer calories per day for a 1-2 lbs weight loss per week. Increase exercise to 30 min, 5 times a week as tolerated for a goal of 150 mins a week. Calorie tracking apps such as myfitness pal can be helpful for keeping track of calorie intake. Decrease simple carbohydrates (white bread, pasta, white rice), and increasing vegetables, fruit, and protein. Increase water. 7. Chest pain, unspecified type  Assessment & Plan:  Patient reports chest pain has resolved. Her stress test was unremarkable. Declines cardiology referral at this time. Go to ED with any new chest pain. 8. Snoring  Assessment & Plan:  Patient has apt with pulmonologist for sleep study scheduled. Subjective      Subjective    Patient here for follow-up of elevated blood pressure. She is not exercising and is adherent to a low-salt diet. Blood pressure is well controlled at home. Cardiac symptoms: none. Patient denies: chest pain, claudication, dyspnea, exertional chest pressure/discomfort, orthopnea and palpitations. Cardiovascular risk factors: diabetes mellitus, dyslipidemia, obesity (BMI >= 30 kg/m2) and sedentary lifestyle. Use of agents associated with hypertension: steroids. History of target organ damage: none. Review of Systems   Constitutional: Negative. HENT: Negative. Respiratory: Negative. Cardiovascular: Negative. Gastrointestinal: Negative. Genitourinary: Negative. Musculoskeletal: Negative. Skin: Negative. Neurological: Negative. Psychiatric/Behavioral: Negative.         Current Outpatient Medications on File Prior to Visit   Medication Sig   • Ascorbic Acid (VITAMIN C PO)   Start: 06/15/23 10:32:00 EDT, PO, 1 Unknown, 1 Refill(s), Take 1 each by mouth in the morning   • B Complex Vitamins (VITAMIN B COMPLEX PO)   Start: 06/15/23 10:34:00 EDT, PO, 1 Unknown, 1 Refill(s), Take 1 each by mouth in the morning   • Bacillus Coagulans-Inulin (PROBIOTIC FORMULA PO)   Start: 06/15/23 10:34:00 EDT, PO, 1 Unknown, 1 Refill(s), Take 1 each by mouth daily   • Calcium Carb-Cholecalciferol (Calcium/Vitamin D) 600-400 MG-UNIT TABS Take 1 tablet by mouth 2 (two) times a day   • Calcium Citrate-Vitamin D 250-5 MG-MCG TABS   Start: 06/15/23 10:31:00 EDT, PO, 1 Unknown, 1 Refill(s), Take 1 tablet by mouth 2 (two) times a day   • diphenhydrAMINE (BENADRYL) 25 mg tablet    • Farxiga 10 MG tablet Take 1 tablet (10 mg total) by mouth daily   • fexofenadine (ALLEGRA) 180 MG tablet Take 180 mg by mouth daily PRN   • fluticasone (FLONASE) 50 mcg/act nasal spray    • folic acid (FOLVITE) 1 mg tablet Take 1,000 mcg by mouth daily   • hydrochlorothiazide (HYDRODIURIL) 25 mg tablet Take 12.5 mg by mouth daily   • hydrOXYzine HCL (ATARAX) 10 mg tablet Take 1 tablet (10 mg total) by mouth every 6 (six) hours as needed for anxiety   • levothyroxine 150 mcg tablet Take 1 tablet (150 mcg total) by mouth daily   • lisinopril (ZESTRIL) 40 mg tablet Take 1 tablet (40 mg total) by mouth daily   • Magnesium 400 MG CAPS Take 1 each by mouth daily   • methotrexate 2.5 MG tablet Take 8 tablets by mouth once a week   • Misc Natural Products (FIBER SUPREME PO) Take 1 Scoop by mouth daily   • Omega-3 Fatty Acids (Fish Oil) 1200 MG CAPS Take 1 capsule by mouth in the morning   • omeprazole (PriLOSEC) 20 mg delayed release capsule Take 1 capsule (20 mg total) by mouth daily   • predniSONE 5 mg tablet Take 1 tablet (5 mg total) by mouth daily with breakfast AND 1 tablet (5 mg total) daily with lunch.    • Probiotic Product (CULTURELLE PROBIOTICS PO) Take 1 each by mouth daily   • sertraline (ZOLOFT) 100 mg tablet Take 2 tablets (200 mg total) by mouth daily   • Vitamin B Complex-C CAPS Take 1 each by mouth in the morning   • atorvastatin (LIPITOR) 80 mg tablet Take 1 tablet (80 mg total) by mouth daily   • Magnesium Oxide 400 MG CAPS   Start: 06/15/23 10:33:00 EDT, PO, 1 Unknown, 1 Refill(s), Take 1 each by mouth daily   • [DISCONTINUED] Omega-3 Fatty Acids (OMEGA 3 PO)   Start: 06/15/23 10:34:00 EDT, PO, 1 Unknown, 1 Refill(s), Take 1 capsule by mouth in the morning (Patient not taking: Reported on 7/28/2023)   • [DISCONTINUED] predniSONE 5 mg tablet   Start: 06/15/23 10:35:00 EDT, PO, 1 Refill(s), Take 1 tablet (5 mg total) by mouth daily with breakfast AND 1 tablet (5 mg total) daily with lunch. (Patient not taking: Reported on 7/28/2023)       Objective     /86   Pulse 95   Temp 98.2 °F (36.8 °C)   Ht 5' 3" (1.6 m)   Wt 103 kg (227 lb 3.2 oz)   SpO2 97%   BMI 40.25 kg/m²     Physical Exam  Constitutional:       General: She is not in acute distress. Appearance: Normal appearance. HENT:      Head: Normocephalic and atraumatic. Right Ear: External ear normal.      Left Ear: External ear normal.      Nose: Nose normal.   Eyes:      General:         Right eye: No discharge. Left eye: No discharge. Conjunctiva/sclera: Conjunctivae normal.   Cardiovascular:      Rate and Rhythm: Normal rate and regular rhythm. Heart sounds: Normal heart sounds. No murmur heard. Pulmonary:      Effort: Pulmonary effort is normal.      Breath sounds: Normal breath sounds. Abdominal:      General: Bowel sounds are normal.      Palpations: Abdomen is soft. Musculoskeletal:         General: Normal range of motion. Cervical back: Normal range of motion. Right lower leg: No edema. Left lower leg: No edema. Lymphadenopathy:      Cervical: No cervical adenopathy. Skin:     General: Skin is warm and dry. Neurological:      Mental Status: She is alert and oriented to person, place, and time.    Psychiatric:         Mood and Affect: Mood normal.         Behavior: Behavior normal. Thought Content:  Thought content normal.         Judgment: Judgment normal.       KEIRA Boyer

## 2023-07-28 NOTE — PATIENT INSTRUCTIONS
Hypertension   WHAT YOU NEED TO KNOW:   Hypertension is high blood pressure. Your blood pressure is the force of your blood moving against the walls of your arteries. Hypertension causes your blood pressure to get so high that your heart has to work much harder than normal. This can damage your heart. Hypertension that does not respond to medicines and lifestyle changes is called resistant hypertension. Hypertension is considered chronic when it continues for 3 months or longer. DISCHARGE INSTRUCTIONS:   Call your local emergency number (911 in the [address]) or have someone call if:   You have chest pain. You have any of the following signs of a heart attack:      Squeezing, pressure, or pain in your chest    You may  also have any of the following:     Discomfort or pain in your back, neck, jaw, stomach, or arm    Shortness of breath    Nausea or vomiting    Lightheadedness or a sudden cold sweat    You become confused or have trouble speaking. You suddenly feel lightheaded or have trouble breathing. Return to the emergency department if:   You have a severe headache or vision loss. You have weakness in an arm or leg. Call your doctor or cardiologist if:   You feel faint, dizzy, confused, or drowsy. You have been taking your blood pressure medicine but your pressure is higher than your provider says it should be. You have questions or concerns about your condition or care. Medicines: You may  need any of the following:  Antihypertensives  may be used to help lower your blood pressure. Several kinds of medicines are available. Your healthcare provider will choose medicines based on the kind of hypertension you have. You may need more than one type of medicine. Take the medicine exactly as directed. Diuretics  help decrease extra fluid that collects in your body. This will help lower your blood pressure. You may urinate more often while you take this medicine.     Cholesterol medicine  helps lower your cholesterol level. A low cholesterol level helps prevent heart disease and makes it easier to control your blood pressure. Take your medicine as directed. Contact your healthcare provider if you think your medicine is not helping or if you have side effects. Tell your provider if you are allergic to any medicine. Keep a list of the medicines, vitamins, and herbs you take. Include the amounts, and when and why you take them. Bring the list or the pill bottles to follow-up visits. Carry your medicine list with you in case of an emergency. Follow up with your doctor or cardiologist as directed: You will need to return to have your blood pressure checked and to have other lab tests done. Write down your questions so you remember to ask them during your visits. Stages of hypertension:  Your healthcare provider will give you a blood pressure goal based on your age, health, and risk for cardiovascular disease. The following are general guidelines on the stages of hypertension:  Normal blood pressure is 119/79 or lower . Your healthcare provider may only check your blood pressure each year if it stays at a normal level. Elevated blood pressure is 120/79 to 129/79 . This is sometimes called prehypertension. Your healthcare provider may suggest lifestyle changes to help lower your blood pressure to a normal level. He or she may then check it again in 3 to 6 months. Stage 1 hypertension is 130/80  to 139/89 . Your provider may recommend lifestyle changes, medication, and checks every 3 to 6 months until your blood pressure is controlled. Stage 2 hypertension is 140/90 or higher . Your provider will recommend lifestyle changes and have you take 2 kinds of hypertension medicines. You will also need to have your blood pressure checked monthly until it is controlled. Manage hypertension:   Check your blood pressure at home.   Do not smoke, have caffeine, or exercise for at least 30 minutes before you check your blood pressure. Sit and rest for 5 minutes before you check your blood pressure. Extend your arm and support it on a flat surface. Your arm should be at the same level as your heart. Follow the directions that came with your blood pressure monitor. Check your blood pressure 2 times, 1 minute apart, before you take your medicine in the morning. Also check your blood pressure before your evening meal. Keep a record of your readings and bring it to your follow-up visits. Ask your healthcare provider what your blood pressure should be. Manage any other health conditions you have. Health conditions such as diabetes can increase your risk for hypertension. Follow your healthcare provider's instructions and take all your medicines as directed. Ask about all medicines. Certain medicines can increase your blood pressure. Examples include oral birth control pills, decongestants, herbal supplements, and NSAIDs, such as ibuprofen. Your healthcare provider can tell you which medicines are safe for you to take. This includes prescription and over-the-counter medicines. Lifestyle changes you can make to manage hypertension:  Your healthcare provider may recommend you work with a team to manage hypertension. The team may include medical experts such as a dietitian, an exercise or physical therapist, and a behavior therapist. Your family members may be included in helping you create lifestyle changes. Limit sodium (salt) as directed. Too much sodium can affect your fluid balance. Check labels to find low-sodium or no-salt-added foods. Some low-sodium foods use potassium salts for flavor. Too much potassium can also cause health problems. Your healthcare provider will tell you how much sodium and potassium are safe for you to have in a day. He or she may recommend that you limit sodium to 2,300 mg a day. Follow the meal plan recommended by your healthcare provider.   A dietitian or your provider can give you more information on low-sodium plans or the DASH (Dietary Approaches to Stop Hypertension) eating plan. The DASH plan is low in sodium, processed sugar, unhealthy fats, and total fat. It is high in potassium, calcium, and fiber. These can be found in vegetables, fruit, and whole-grain foods. Be physically active throughout the day. Physical activity, such as exercise, can help control your blood pressure and your weight. Be physically active for at least 30 minutes per day, on most days of the week. Include aerobic activity, such as walking or riding a bicycle. Also include strength training at least 2 times each week. Your healthcare providers can help you create a physical activity plan. Decrease stress. This may help lower your blood pressure. Learn ways to relax, such as deep breathing or listening to music. Limit alcohol as directed. Alcohol can increase your blood pressure. A drink of alcohol is 12 ounces of beer, 5 ounces of wine, or 1½ ounces of liquor. Do not smoke. Nicotine and other chemicals in cigarettes and cigars can increase your blood pressure and also cause lung damage. Ask your healthcare provider for information if you currently smoke and need help to quit. E-cigarettes or smokeless tobacco still contain nicotine. Talk to your healthcare provider before you use these products. © Copyright Hector Dickson 2022 Information is for End User's use only and may not be sold, redistributed or otherwise used for commercial purposes. The above information is an  only. It is not intended as medical advice for individual conditions or treatments. Talk to your doctor, nurse or pharmacist before following any medical regimen to see if it is safe and effective for you.

## 2023-07-31 ENCOUNTER — TELEPHONE (OUTPATIENT)
Dept: ENDOCRINOLOGY | Facility: CLINIC | Age: 58
End: 2023-07-31

## 2023-07-31 NOTE — TELEPHONE ENCOUNTER
Received phone call from patient requesting her medical release for [facility] be refaxed (did confirm fax number was correct) sent attn: Jay Finley

## 2023-08-01 ENCOUNTER — PATIENT OUTREACH (OUTPATIENT)
Age: 58
End: 2023-08-01

## 2023-08-01 NOTE — PROGRESS NOTES
Called Patient to introduce them to care management program. No answer, voicemail left with callback information. My Chart message sent as well with request to respond with best day and time for outreach. Will reach out again if no return response. Patient responded back through [employer] with availability after 3pm. I provided her with 2 dates next week.

## 2023-08-07 ENCOUNTER — PATIENT OUTREACH (OUTPATIENT)
Age: 58
End: 2023-08-07

## 2023-08-07 NOTE — PROGRESS NOTES
Spoke with patient during scheduled outreach call. I provided a general overview of the program. Patient consented to participation. Patient currently dinks a gallon of water, 2 cups of caffiene and about 4-5 alcoholic beverages a day. The alcohol ranges from a hard seltzer to wine to mixed drinks. Her goal is to cut alcohol back to no more than 2 servings and coffee to 1 cup. Next phone outreach 8/14 to discuss calorie tracking. All questions and concerns about program were answered.

## 2023-08-11 ENCOUNTER — TELEMEDICINE (OUTPATIENT)
Dept: FAMILY MEDICINE CLINIC | Facility: CLINIC | Age: 58
End: 2023-08-11

## 2023-08-11 DIAGNOSIS — F41.9 ANXIETY: Primary | ICD-10-CM

## 2023-08-11 PROCEDURE — 90834 PSYTX W PT 45 MINUTES: CPT

## 2023-08-11 NOTE — PROGRESS NOTES
Behavioral Health Psychotherapy Assessment    Date of Initial Psychotherapy Assessment: 08/11/23  Referral Source: Saira Troy  Has a release of information been signed for the referral source? Yes    Preferred Name: Alyx Herrera  Preferred Pronouns: She/her  YOB: 1965 Age: 62 y.o. Sex assigned at birth: female   Gender Identity: female  Race:   Preferred Language: English    Emergency Contact:  Full Name: Suzanne Parish  Relationship to Client: Spouse  Contact information: [phone]    Primary Care Physician:  Kenton Perdomo [facility] [address]  [address] [facility]  [phone]  Has a release of information been signed? Yes    Physical Health History:  Past surgical procedures: n/a  Do you have a history of any of the following: diabetes and thyroid disease  Do you have any mobility issues? No    Relevant Family History:  No significant relevant family history reported. Presenting Problem (What brings you in?)  My health issues is affecting my mental health     Mental Health Advance Directive:  Do you currently have a Mental Health Advance Directive? no    Diagnosis:   Diagnosis ICD-10-CM Associated Orders   1.  Anxiety  F41.9           Initial Assessment:     Current Mental Status:    Appearance: appropriate      Behavior/Manner: cooperative      Affect/Mood:  Good    Speech:  Normal    Oriented to: oriented to self, oriented to place and oriented to time       Clinical Symptoms    Anxiety: yes      Anxiety Symptoms: excessive worry and nervous/anxious      Have you ever been assaultive to others or the environment: No      Have you ever been self-injurious: No      Counseling History:  Previous Counseling or Treatment  (Mental Health or Drug & Alcohol): No    Have you previously taken psychiatric medications: Yes    Previous Medications Attempted:  Patient is currently taking Zoloft    Suicide Risk Assessment  Have you ever had a suicide attempt: No    Have you had incidents of suicidal ideation: No    Are you currently experiencing suicidal thoughts: No      Substance Abuse/Addiction Assessment:  Have you experienced blackouts as a result of substance use: No    Are you currently using any Medication Assisted Treatment for Substance Use: No      Disordered Eating History:  Do you have a history of disordered eating: No      Social Determinants of Health:    SDOH:  Stress    Trauma and Abuse History:    Have you ever been abused: No      Legal History:    Have you ever been arrested  or had a DUI: No      Have you been incarcerated: No      Are you currently on parole/probation: No      Any current Children and Youth involvement: No      Any pending legal charges: No      Relationship History:    Current marital status:       Natural Supports:  Friends    Employment History    Are you currently employed: Yes      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 female who presents for an initial therapy session with this provider. Patient is  and reports being happily  to a very supportive and understanding . She has two step-children and two dogs. She also has three grandchildren, all girls. Patient's job is a second career move for her and she "loves what she does". Patient reports that she loves being active and travelling. She has a camper and enjoys "glamping" and going to an [place] once a year in the winter months.  Patient was diagnosed with a chronic illness back in March of this year that tends to have flare-ups, causing her to be in pain and her body to be stiff and harder to move. Patient reports feeling frustrated by this because she likes to be active and is always on the go. Patient reports that she can often struggle with her emotions with this diagnosis and the limitations it puts on her physically. Validated feelings and discussed thoughts processes and coping strategies to support her mental well-being. Provided a safe space for patient to process feelings, review old and discuss new coping skills. Provided supportive therapy to reduce emotional distress, to work on improving self-esteem and self-care and enhance coping skills using attentive, reflective and sympathetic listening.      Visit start and stop times:    08/11/23  Start Time: 0800  Stop Time: 0850  Total Visit Time: 50 minutes

## 2023-08-13 DIAGNOSIS — F41.9 ANXIETY: ICD-10-CM

## 2023-08-13 RX ORDER — SERTRALINE HYDROCHLORIDE 100 MG/1
200 TABLET, FILM COATED ORAL DAILY
Qty: 180 TABLET | Refills: 0 | Status: SHIPPED | OUTPATIENT
Start: 2023-08-13

## 2023-08-14 ENCOUNTER — PATIENT OUTREACH (OUTPATIENT)
Age: 58
End: 2023-08-14

## 2023-08-14 NOTE — PROGRESS NOTES
Spoke with patient during scheduled outreach. Her goal was to work on drinking less coffee and alcohol. Patient was able to decrease coffee, however the alcohol is very much part of her habit. She opted to have seltzer with a shot in it 2-4 times an evening rather than a mixture of wine and hard seltzers. This is something that she does regularly without thinking and didn't even call attention to it when she was doing it except to change the drink of choice slightly. We talked about alcohol being empty calories and that it may come down to a choice of food or alcohol when she starts working on calorie restricting. Patient is very honest and aware. We moved onto calorie tracking and I provided an overview of the apps and how they are utilized. I asked that she sent her calories Friday morning for review. She also asked about where she could find good recipes that would be acceptable and I suggested [employer] for macro recipes per a recommendation from another patient. Next outreach based on availability will be 9/7.

## 2023-08-21 ENCOUNTER — PATIENT OUTREACH (OUTPATIENT)
Age: 58
End: 2023-08-21

## 2023-08-21 NOTE — PROGRESS NOTES
Patient sent calorie counts as requested. Calories ranged from 8387-2120. Set goal range of 5105-1789.  Next phone outreach 9/7

## 2023-08-24 ENCOUNTER — PATIENT OUTREACH (OUTPATIENT)
Dept: FAMILY MEDICINE CLINIC | Facility: CLINIC | Age: 58
End: 2023-08-24

## 2023-08-24 NOTE — PROGRESS NOTES
Patient sent message saying that the beginning of the year is hard to focus on things and asked if there is medication or surgery she could have to help. I let her know she could absolutely start medication, but she would need at least a month to work on nutrition. Will await a response.

## 2023-09-07 ENCOUNTER — PATIENT OUTREACH (OUTPATIENT)
Dept: FAMILY MEDICINE CLINIC | Facility: CLINIC | Age: 58
End: 2023-09-07

## 2023-09-07 NOTE — PROGRESS NOTES
Called patient during scheduled outreach time. No answer, voicemail left with callback information. My Chart message sent with response request for best day and time to reschedule outreach. Will reach out again if no response. Patient sent [employer] message that she was on vacation and that she needs to regroup and will reach out if she would like to participate in the program. At this time patient's care management case is closed.

## 2023-09-10 DIAGNOSIS — I10 HYPERTENSION, UNSPECIFIED TYPE: Primary | ICD-10-CM

## 2023-09-11 RX ORDER — HYDROCHLOROTHIAZIDE 25 MG/1
12.5 TABLET ORAL DAILY
Qty: 30 TABLET | Refills: 0 | Status: SHIPPED | OUTPATIENT
Start: 2023-09-11

## 2023-09-19 DIAGNOSIS — I10 ESSENTIAL HYPERTENSION: ICD-10-CM

## 2023-09-19 RX ORDER — LISINOPRIL 40 MG/1
40 TABLET ORAL DAILY
Qty: 90 TABLET | Refills: 3 | Status: SHIPPED | OUTPATIENT
Start: 2023-09-19

## 2023-09-30 LAB
EXTERNAL CREATININE: 0.6
EXTERNAL EGFR: 104

## 2023-10-12 ENCOUNTER — TELEPHONE (OUTPATIENT)
Dept: ADMINISTRATIVE | Facility: OTHER | Age: 58
End: 2023-10-12

## 2023-10-12 NOTE — TELEPHONE ENCOUNTER
----- Message from Helga Joseph sent at 10/10/2023  3:26 PM EDT -----  Regarding: Care Gap Request  10/10/23 3:26 PM    Hello, our patient attached above has had Glomerular Filtration Rate (GFR) completed/performed. Please assist in updating the patient chart by pulling the document from labs Tab within Chart Review.  The date of service is 9/30/2023     Thank you,  [address] [place] [address] [address] [facility]

## 2023-10-12 NOTE — TELEPHONE ENCOUNTER
Upon review of the In Basket request we were able to locate, review, and update the patient chart as requested for eGFR. Any additional questions or concerns should be emailed to the Practice Liaisons via the appropriate education email address, please do not reply via In Basket.     Thank you  Danuta Mcguire

## 2023-10-15 DIAGNOSIS — I10 HYPERTENSION, UNSPECIFIED TYPE: ICD-10-CM

## 2023-10-16 RX ORDER — HYDROCHLOROTHIAZIDE 25 MG/1
12.5 TABLET ORAL DAILY
Qty: 30 TABLET | Refills: 3 | Status: SHIPPED | OUTPATIENT
Start: 2023-10-16

## 2023-10-19 ENCOUNTER — CLINICAL SUPPORT (OUTPATIENT)
Dept: FAMILY MEDICINE CLINIC | Facility: CLINIC | Age: 58
End: 2023-10-19

## 2023-10-19 DIAGNOSIS — E78.2 MIXED HYPERLIPIDEMIA: ICD-10-CM

## 2023-10-19 DIAGNOSIS — M35.3 POLYMYALGIA RHEUMATICA (HCC): Primary | ICD-10-CM

## 2023-10-19 DIAGNOSIS — E11.9 TYPE 2 DIABETES MELLITUS WITHOUT COMPLICATION, WITHOUT LONG-TERM CURRENT USE OF INSULIN (HCC): ICD-10-CM

## 2023-10-19 RX ORDER — SARILUMAB 150 MG/1.14ML
INJECTION, SOLUTION SUBCUTANEOUS
COMMUNITY
Start: 2023-10-09

## 2023-10-19 RX ORDER — ATORVASTATIN CALCIUM 80 MG/1
80 TABLET, FILM COATED ORAL DAILY
Qty: 90 TABLET | Refills: 3 | Status: SHIPPED | OUTPATIENT
Start: 2023-10-19

## 2023-10-19 NOTE — PROGRESS NOTES
[address], Pharmacist    Magy Markham is a 62 y.o. female who was referred to the pharmacist for Polymyalgia rheumatica / steroid taper education and management, referred by KEIRA Solano . Telemedicine consent  The patient was identified by name and date of birth. Magy Markham was informed that this is a telemedicine visit and that the visit is being conducted through Telephone. My office door was closed. No one else was in the room. She acknowledged consent and understanding of privacy and security of the video platform. The patient has agreed to participate and understands they can discontinue the visit at any time. Assessment/ Plan       Polymyalgia rheumatica    Following with Rheumatology. On methotrexate 2.5 mg 8 tabs weekly (4 tabs in AM and 4 tabs in PM and folic acid daily. Prednisone is at 5 mg daily  Rheumatologist recently prescribed Breezy Severin but patient has not started yet. Goal of Breezy Severin is to get off prednisone. Patient inquired about potential drug interactions with Breezy Severin and current medication. Ran med interaction analysis through [employer]. 2 potential interactions found  Atorvastatin- Kevzara may decrease concentration of atorvastatin. Monitor lipid panel regularly  Omeprazole- Breezy Severin may decrease concentration of omperazole   Patient also inquired about potential side effects after starting medication. We did discuss the rheumatologist will need to monitor labs such as CBC and liver function. Discussed potential injection site irritation and to rotate sites. We also discussed infection risk with being on immunosuppression. She asked if she can get flu shot. Informed her that she should not get live flu vaccine (Flumist intranasal) but otherwise can get the influenza vaccine. A1c and Blood sugar readings have remaining controlled while on steroids.        2. Medication Reconciliation:   Medication list reviewed with pt at today's visit. Medication list updated to reflect medications pt is currently taking    Monitoring: symptoms of PMR, blood sugar checks daily    Follow-up:   As needed with clinical pharmacist    Subjective     Medication Adherence/ Tolerability:  Following with Rheumatology (last appt 5/23/23)  Continues on methotrexate 2.5 mg 8 tabs (20 mg total) every week and Folic acid 1 mg daily  Prednisone- Currently at 5 mg daily  Per rheumatology, they will try Carliss Dom next     Objective       ASCVD Risk:  The 10-year ASCVD risk score (Emil FREEMAN, et al., 2019) is: 5.8%    Values used to calculate the score:      Age: 62 years      Sex: Female      Is Non- : No      Diabetic: Yes      Tobacco smoker: No      Systolic Blood Pressure: 126 mmHg      Is BP treated: Yes      HDL Cholesterol: 63 mg/dL      Total Cholesterol: 191 mg/dL     Home blood sugar:  Mostly low 100's.  Highest fasting reading was 120 mg/dL    Home Blood pressure  130/80     Labs:    Lab Results   Component Value Date    EGFR 104 09/30/2023    CREATININE 0.60 09/30/2023    MICROALBCRE 18 01/21/2023         Pharmacist Tracking Tool     Pharmacist Tracking Tool  Reason For Outreach: Embedded Pharmacist  Demographics:  Intervention Method: Phone  Type of Intervention: Follow-Up  Topics Addressed: Other  Pharmacologic Interventions: Med Rec  Non-Pharmacologic Interventions: Disease state education and Medication/Device education  Time:  Direct Patient Care:  20  mins  Care Coordination:  10  mins  Recommendation Recipient: Patient/Caregiver and Provider  Outcome: Accepted

## 2023-11-05 DIAGNOSIS — F41.9 ANXIETY: ICD-10-CM

## 2023-11-05 RX ORDER — SERTRALINE HYDROCHLORIDE 100 MG/1
200 TABLET, FILM COATED ORAL DAILY
Qty: 180 TABLET | Refills: 0 | Status: SHIPPED | OUTPATIENT
Start: 2023-11-05

## 2023-11-12 LAB
BUN SERPL-MCNC: 12 MG/DL (ref 7–25)
BUN/CREAT SERPL: ABNORMAL (CALC) (ref 6–22)
CALCIUM SERPL-MCNC: 9.3 MG/DL (ref 8.6–10.4)
CHLORIDE SERPL-SCNC: 102 MMOL/L (ref 98–110)
CHOLEST SERPL-MCNC: 220 MG/DL
CHOLEST/HDLC SERPL: 3.4 (CALC)
CO2 SERPL-SCNC: 31 MMOL/L (ref 20–32)
CREAT SERPL-MCNC: 0.59 MG/DL (ref 0.5–1.03)
GFR/BSA.PRED SERPLBLD CYS-BASED-ARV: 104 ML/MIN/1.73M2
GLUCOSE SERPL-MCNC: 124 MG/DL (ref 65–99)
HBA1C MFR BLD: 6.1 % OF TOTAL HGB
HDLC SERPL-MCNC: 65 MG/DL
LDLC SERPL CALC-MCNC: 128 MG/DL (CALC)
NONHDLC SERPL-MCNC: 155 MG/DL (CALC)
POTASSIUM SERPL-SCNC: 4.9 MMOL/L (ref 3.5–5.3)
SODIUM SERPL-SCNC: 140 MMOL/L (ref 135–146)
T4 FREE SERPL-MCNC: 1.2 NG/DL (ref 0.8–1.8)
TRIGL SERPL-MCNC: 155 MG/DL
TSH SERPL-ACNC: 1.84 MIU/L (ref 0.4–4.5)

## 2023-11-13 DIAGNOSIS — E11.9 TYPE 2 DIABETES MELLITUS WITHOUT COMPLICATION, WITHOUT LONG-TERM CURRENT USE OF INSULIN (HCC): Primary | ICD-10-CM

## 2023-11-13 DIAGNOSIS — C73 THYROID CANCER (HCC): ICD-10-CM

## 2023-11-21 DIAGNOSIS — K21.9 GASTROESOPHAGEAL REFLUX DISEASE WITHOUT ESOPHAGITIS: ICD-10-CM

## 2023-11-21 RX ORDER — OMEPRAZOLE 20 MG/1
20 CAPSULE, DELAYED RELEASE ORAL DAILY
Qty: 90 CAPSULE | Refills: 3 | Status: SHIPPED | OUTPATIENT
Start: 2023-11-21

## 2023-11-27 ENCOUNTER — OFFICE VISIT (OUTPATIENT)
Dept: FAMILY MEDICINE CLINIC | Facility: CLINIC | Age: 58
End: 2023-11-27

## 2023-11-27 VITALS — SYSTOLIC BLOOD PRESSURE: 128 MMHG | HEART RATE: 79 BPM | DIASTOLIC BLOOD PRESSURE: 82 MMHG | OXYGEN SATURATION: 94 %

## 2023-11-27 DIAGNOSIS — K21.9 GASTROESOPHAGEAL REFLUX DISEASE WITHOUT ESOPHAGITIS: ICD-10-CM

## 2023-11-27 DIAGNOSIS — G47.33 OBSTRUCTIVE SLEEP APNEA SYNDROME: ICD-10-CM

## 2023-11-27 DIAGNOSIS — E11.9 TYPE 2 DIABETES MELLITUS WITHOUT COMPLICATION, WITHOUT LONG-TERM CURRENT USE OF INSULIN (HCC): Primary | ICD-10-CM

## 2023-11-27 DIAGNOSIS — C73 THYROID CANCER (HCC): ICD-10-CM

## 2023-11-27 DIAGNOSIS — M35.3 POLYMYALGIA RHEUMATICA (HCC): ICD-10-CM

## 2023-11-27 DIAGNOSIS — I10 PRIMARY HYPERTENSION: ICD-10-CM

## 2023-11-27 DIAGNOSIS — F41.9 ANXIETY: ICD-10-CM

## 2023-11-27 PROBLEM — R07.9 CHEST PAIN: Status: RESOLVED | Noted: 2023-06-15 | Resolved: 2023-11-27

## 2023-11-27 PROBLEM — R07.9 CHEST PAIN: Status: ACTIVE | Noted: 2023-06-15

## 2023-11-27 PROBLEM — R21 RASH AND NONSPECIFIC SKIN ERUPTION: Status: RESOLVED | Noted: 2023-06-16 | Resolved: 2023-11-27

## 2023-11-27 PROCEDURE — 99214 OFFICE O/P EST MOD 30 MIN: CPT | Performed by: NURSE PRACTITIONER

## 2023-11-27 RX ORDER — BACILLUS COAGULANS/INULIN 1B-250 MG
CAPSULE ORAL
COMMUNITY
Start: 2023-06-15

## 2023-11-27 RX ORDER — PREDNISONE 5 MG/1
TABLET ORAL
COMMUNITY
Start: 2023-06-15 | End: 2023-11-27

## 2023-11-27 NOTE — ASSESSMENT & PLAN NOTE
Well controlled on HCTZ and Lisinopril  Discussed low salt diet, increasing exercise to 30 mins 3-4x a week. Check home BP and record for next visit. BP goal <140/90. Discussed ER precautions for chest pain, stroke precautions, or BP of 180/120 or greater (hypertensive crisis), change in LOC or worsening symptoms. Call with new or worsening symptoms. If you have numbness, tingling, weakness, or severe headache with elevated BP go to the ED.

## 2023-11-27 NOTE — PATIENT INSTRUCTIONS
Hypertension   WHAT YOU NEED TO KNOW:   Hypertension is high blood pressure. Your blood pressure is the force of your blood moving against the walls of your arteries. Hypertension causes your blood pressure to get so high that your heart has to work much harder than normal. This can damage your heart. Hypertension that does not respond to medicines and lifestyle changes is called resistant hypertension. Hypertension is considered chronic when it continues for 3 months or longer. DISCHARGE INSTRUCTIONS:   Call your local emergency number (911 in the [address]) or have someone call if:   You have chest pain. You have any of the following signs of a heart attack:      Squeezing, pressure, or pain in your chest    You may  also have any of the following:     Discomfort or pain in your back, neck, jaw, stomach, or arm    Shortness of breath    Nausea or vomiting    Lightheadedness or a sudden cold sweat    You become confused or have trouble speaking. You suddenly feel lightheaded or have trouble breathing. Return to the emergency department if:   You have a severe headache or vision loss. You have weakness in an arm or leg. Call your doctor or cardiologist if:   You feel faint, dizzy, confused, or drowsy. You have been taking your blood pressure medicine but your pressure is higher than your provider says it should be. You have questions or concerns about your condition or care. Medicines: You may  need any of the following:  Antihypertensives  may be used to help lower your blood pressure. Several kinds of medicines are available. Your healthcare provider will choose medicines based on the kind of hypertension you have. You may need more than one type of medicine. Take the medicine exactly as directed. Diuretics  help decrease extra fluid that collects in your body. This will help lower your blood pressure. You may urinate more often while you take this medicine.     Cholesterol medicine  helps lower your cholesterol level. A low cholesterol level helps prevent heart disease and makes it easier to control your blood pressure. Take your medicine as directed. Contact your healthcare provider if you think your medicine is not helping or if you have side effects. Tell your provider if you are allergic to any medicine. Keep a list of the medicines, vitamins, and herbs you take. Include the amounts, and when and why you take them. Bring the list or the pill bottles to follow-up visits. Carry your medicine list with you in case of an emergency. Follow up with your doctor or cardiologist as directed: You will need to return to have your blood pressure checked and to have other lab tests done. Write down your questions so you remember to ask them during your visits. Stages of hypertension:  Your healthcare provider will give you a blood pressure goal based on your age, health, and risk for cardiovascular disease. The following are general guidelines on the stages of hypertension:  Normal blood pressure is 119/79 or lower . Your healthcare provider may only check your blood pressure each year if it stays at a normal level. Elevated blood pressure is 120/79 to 129/79 . This is sometimes called prehypertension. Your healthcare provider may suggest lifestyle changes to help lower your blood pressure to a normal level. He or she may then check it again in 3 to 6 months. Stage 1 hypertension is 130/80  to 139/89 . Your provider may recommend lifestyle changes, medication, and checks every 3 to 6 months until your blood pressure is controlled. Stage 2 hypertension is 140/90 or higher . Your provider will recommend lifestyle changes and have you take 2 kinds of hypertension medicines. You will also need to have your blood pressure checked monthly until it is controlled. Manage hypertension:   Check your blood pressure at home.   Do not smoke, have caffeine, or exercise for at least 30 minutes before you check your blood pressure. Sit and rest for 5 minutes before you check your blood pressure. Extend your arm and support it on a flat surface. Your arm should be at the same level as your heart. Follow the directions that came with your blood pressure monitor. Check your blood pressure 2 times, 1 minute apart, before you take your medicine in the morning. Also check your blood pressure before your evening meal. Keep a record of your readings and bring it to your follow-up visits. Ask your healthcare provider what your blood pressure should be. Manage any other health conditions you have. Health conditions such as diabetes can increase your risk for hypertension. Follow your healthcare provider's instructions and take all your medicines as directed. Ask about all medicines. Certain medicines can increase your blood pressure. Examples include oral birth control pills, decongestants, herbal supplements, and NSAIDs, such as ibuprofen. Your healthcare provider can tell you which medicines are safe for you to take. This includes prescription and over-the-counter medicines. Lifestyle changes you can make to manage hypertension:  Your healthcare provider may recommend you work with a team to manage hypertension. The team may include medical experts such as a dietitian, an exercise or physical therapist, and a behavior therapist. Your family members may be included in helping you create lifestyle changes. Limit sodium (salt) as directed. Too much sodium can affect your fluid balance. Check labels to find low-sodium or no-salt-added foods. Some low-sodium foods use potassium salts for flavor. Too much potassium can also cause health problems. Your healthcare provider will tell you how much sodium and potassium are safe for you to have in a day. He or she may recommend that you limit sodium to 2,300 mg a day. Follow the meal plan recommended by your healthcare provider.   A dietitian or your provider can give you more information on low-sodium plans or the DASH (Dietary Approaches to Stop Hypertension) eating plan. The DASH plan is low in sodium, processed sugar, unhealthy fats, and total fat. It is high in potassium, calcium, and fiber. These can be found in vegetables, fruit, and whole-grain foods. Be physically active throughout the day. Physical activity, such as exercise, can help control your blood pressure and your weight. Be physically active for at least 30 minutes per day, on most days of the week. Include aerobic activity, such as walking or riding a bicycle. Also include strength training at least 2 times each week. Your healthcare providers can help you create a physical activity plan. Decrease stress. This may help lower your blood pressure. Learn ways to relax, such as deep breathing or listening to music. Limit alcohol as directed. Alcohol can increase your blood pressure. A drink of alcohol is 12 ounces of beer, 5 ounces of wine, or 1½ ounces of liquor. Do not smoke. Nicotine and other chemicals in cigarettes and cigars can increase your blood pressure and also cause lung damage. Ask your healthcare provider for information if you currently smoke and need help to quit. E-cigarettes or smokeless tobacco still contain nicotine. Talk to your healthcare provider before you use these products. © Copyright [employer] 2023 Information is for End User's use only and may not be sold, redistributed or otherwise used for commercial purposes. The above information is an  only. It is not intended as medical advice for individual conditions or treatments. Talk to your doctor, nurse or pharmacist before following any medical regimen to see if it is safe and effective for you.

## 2023-11-27 NOTE — ASSESSMENT & PLAN NOTE
Seeing Dr. Abbi Bello, takes methotrexatrae and Abdiel Landaverde. She will start tapering prednisone to get her off of the prednisone.

## 2023-11-27 NOTE — ASSESSMENT & PLAN NOTE
Currently following [address] Endocrinology.    Lab Results   Component Value Date    HGBA1C 6.1 (H) 11/11/2023

## 2023-11-27 NOTE — PROGRESS NOTES
Diabetic Foot Exam    Patient's shoes and socks removed. Right Foot/Ankle   Right Foot Inspection  Skin Exam: skin normal and skin intact. No dry skin, no warmth, no callus, no erythema, no maceration, no abnormal color, no pre-ulcer, no ulcer and no callus. Toe Exam: ROM and strength within normal limits. No swelling, no tenderness, erythema and  no right toe deformity    Sensory   Vibration: intact  Proprioception: intact  Monofilament testing: intact    Vascular  Capillary refills: < 3 seconds  The right DP pulse is 2+. The right PT pulse is 2+. Left Foot/Ankle  Left Foot Inspection  Skin Exam: skin normal and skin intact. No dry skin, no warmth, no erythema, no maceration, normal color, no pre-ulcer, no ulcer and no callus. Toe Exam: ROM and strength within normal limits. No swelling, no tenderness, no erythema and no left toe deformity. Sensory   Vibration: intact  Proprioception: intact  Monofilament testing: intact    Vascular  Capillary refills: < 3 seconds  The left DP pulse is 2+. The left PT pulse is 2+. Assign Risk Category  No deformity present  No loss of protective sensation  No weak pulses  Risk: 0  Name: Magy Markham      : 1965      MRN: [id number]  Encounter Provider: KEIRA Solano  Encounter Date: 2023   Encounter department: [address] [place] [address] IN PARTNERSHIP WITH [facility]    Assessment & Plan     1. Type 2 diabetes mellitus without complication, without long-term current use of insulin ([address])  Assessment & Plan:  Currently following [facility] Endocrinology. Lab Results   Component Value Date    HGBA1C 6.1 (H) 2023         2. Primary hypertension  Assessment & Plan:  Well controlled on HCTZ and Lisinopril  Discussed low salt diet, increasing exercise to 30 mins 3-4x a week. Check home BP and record for next visit. BP goal <140/90.  Discussed ER precautions for chest pain, stroke precautions, or BP of 180/120 or greater (hypertensive crisis), change in LOC or worsening symptoms. Call with new or worsening symptoms. If you have numbness, tingling, weakness, or severe headache with elevated BP go to the ED. 3. Thyroid cancer [facility])  Assessment & Plan:  [address] [place]      4. Polymyalgia rheumatica (HCC)  Assessment & Plan:  Seeing Dr. Agueda Andersen, takes methotrexatrae and Holli Davon. She will start tapering prednisone to get her off of the prednisone. 5. Gastroesophageal reflux disease without esophagitis  Assessment & Plan:  Well controlled on 20mg      6. Anxiety  Assessment & Plan:  Well controlled on Zoloft. Denies SI/HI. Follows with .      7. Obstructive sleep apnea syndrome  Assessment & Plan:  Recently received C-pap and admits to nightly adherence. Subjective        Hypertension  Patient is here for follow-up of elevated blood pressure. She active but not exercising and trying to adherent to a low-salt diet. Blood pressure is well controlled at home. Cardiac symptoms: none. Patient denies chest pain, dyspnea, fatigue, near-syncope, and orthopnea. Cardiovascular risk factors: diabetes mellitus, hypertension, and obesity (BMI >= 30 kg/m2). Use of agents associated with hypertension: none. History of target organ damage: none. Following Endocrinology for Diabetes. Review of Systems   Constitutional: Negative. HENT: Negative. Eyes: Negative. Respiratory: Negative. Cardiovascular: Negative. Gastrointestinal: Negative. Genitourinary: Negative. Musculoskeletal: Negative. Skin: Negative.         Current Outpatient Medications on File Prior to Visit   Medication Sig   • atorvastatin (LIPITOR) 80 mg tablet TAKE 1 TABLET DAILY   • Bacillus Coagulans-Inulin (Probiotic) 1-250 BILLION-MG CAPS Start: 06/15/23 10:34:00 EDT, PO, 1 Unknown, 1 Refill(s), Take 1 each by mouth daily   • Calcium Carb-Cholecalciferol (Calcium/Vitamin D) 600-400 MG-UNIT TABS Take 1 tablet by mouth 2 (two) times a day   • Calcium Citrate-Vitamin D 250-5 MG-MCG TABS Start: 06/15/23 10:31:00 EDT, PO, 1 Unknown, 1 Refill(s), Take 1 tablet by mouth 2 (two) times a day   • diphenhydrAMINE (BENADRYL) 25 mg tablet    • Farxiga 10 MG tablet Take 1 tablet (10 mg total) by mouth daily   • fexofenadine (ALLEGRA) 180 MG tablet Take 180 mg by mouth daily PRN   • fluticasone (FLONASE) 50 mcg/act nasal spray    • folic acid (FOLVITE) 1 mg tablet Take 1,000 mcg by mouth daily   • hydrochlorothiazide (HYDRODIURIL) 25 mg tablet Take 0.5 tablets (12.5 mg total) by mouth daily   • hydrOXYzine HCL (ATARAX) 10 mg tablet Take 1 tablet (10 mg total) by mouth every 6 (six) hours as needed for anxiety   • Kevzara 150 MG/1. 14ML SOAJ    • lisinopril (ZESTRIL) 40 mg tablet TAKE 1 TABLET DAILY   • Magnesium 400 MG CAPS Take 1 each by mouth daily   • methotrexate 2.5 MG tablet Take 8 tablets by mouth once a week   • metroNIDAZOLE (METROCREAM) 0.75 % cream    • Misc Natural Products (FIBER SUPREME PO) Take 1 Scoop by mouth daily   • omeprazole (PriLOSEC) 20 mg delayed release capsule TAKE 1 CAPSULE DAILY   • sertraline (ZOLOFT) 100 mg tablet Take 2 tablets (200 mg total) by mouth daily   • [DISCONTINUED] B Complex Vitamins (VITAMIN B COMPLEX 100 IJ) Start: 06/15/23 10:34:00 EDT, PO, 1 Unknown, 1 Refill(s), Take 1 each by mouth in the morning   • [DISCONTINUED] Magnesium Gluconate (MAGNESIUM 27 PO) Start: 06/15/23 10:33:00 EDT, PO, 1 Unknown, 1 Refill(s), Take 1 each by mouth daily   • [DISCONTINUED] Omega 3 340 MG CPDR Start: 06/15/23 10:34:00 EDT, PO, 1 Unknown, 1 Refill(s), Take 1 capsule by mouth in the morning   • [DISCONTINUED] predniSONE 5 MG (21) TBPK Start: 06/15/23 10:35:00 EDT, PO, 1 Refill(s), Take 1 tablet (5 mg total) by mouth daily with breakfast AND 1 tablet (5 mg total) daily with lunch.    • levothyroxine 150 mcg tablet Take 1 tablet (150 mcg total) by mouth daily   • Probiotic Product (CULTURELLE PROBIOTICS PO) Take 1 each by mouth daily   • [DISCONTINUED] B Complex Vitamins (VITAMIN B COMPLEX PO)   Start: 06/15/23 10:34:00 EDT, PO, 1 Unknown, 1 Refill(s), Take 1 each by mouth in the morning   • [DISCONTINUED] Omega-3 Fatty Acids (Fish Oil) 1200 MG CAPS Take 1 capsule by mouth in the morning   • [DISCONTINUED] predniSONE 5 mg tablet Take 1 tablet (5 mg total) by mouth daily with breakfast AND 1 tablet (5 mg total) daily with lunch. (Patient taking differently: 5 mg once daily)       Objective     /82 (BP Location: Left arm, Patient Position: Sitting, Cuff Size: Standard)   Pulse 79   SpO2 94%     Physical Exam  Vitals and nursing note reviewed. Constitutional:       General: She is not in acute distress. Appearance: Normal appearance. HENT:      Head: Normocephalic and atraumatic. Right Ear: External ear normal.      Left Ear: External ear normal.      Nose: Nose normal.   Eyes:      General:         Right eye: No discharge. Left eye: No discharge. Conjunctiva/sclera: Conjunctivae normal.   Cardiovascular:      Rate and Rhythm: Normal rate and regular rhythm. Pulses: no weak pulses          Dorsalis pedis pulses are 2+ on the right side and 2+ on the left side. Posterior tibial pulses are 2+ on the right side and 2+ on the left side. Heart sounds: Normal heart sounds. No murmur heard. Pulmonary:      Effort: Pulmonary effort is normal.      Breath sounds: Normal breath sounds. Abdominal:      General: Bowel sounds are normal.      Palpations: Abdomen is soft. Musculoskeletal:         General: Normal range of motion. Cervical back: Normal range of motion. Right lower leg: No edema. Left lower leg: No edema. Feet:      Right foot:      Skin integrity: No ulcer, skin breakdown, erythema, warmth, callus or dry skin. Left foot:      Skin integrity: No ulcer, skin breakdown, erythema, warmth, callus or dry skin.    Lymphadenopathy:      Cervical: No cervical adenopathy. Skin:     General: Skin is warm and dry. Neurological:      Mental Status: She is alert and oriented to person, place, and time. Psychiatric:         Mood and Affect: Mood normal.         Behavior: Behavior normal.         Thought Content:  Thought content normal.         Judgment: Judgment normal.       KEIRA Gann

## 2024-01-13 ENCOUNTER — AMB VIDEO VISIT (OUTPATIENT)
Dept: OTHER | Facility: HOSPITAL | Age: 59
End: 2024-01-13

## 2024-01-13 VITALS — HEART RATE: 84 BPM | RESPIRATION RATE: 20 BRPM

## 2024-01-13 DIAGNOSIS — K04.7 PERIAPICAL ABSCESS: Primary | ICD-10-CM

## 2024-01-13 PROCEDURE — ECARE PR SL URGENT CARE VIRTUAL VISIT: Performed by: PHYSICIAN ASSISTANT

## 2024-01-13 RX ORDER — CLINDAMYCIN HYDROCHLORIDE 300 MG/1
300 CAPSULE ORAL EVERY 8 HOURS SCHEDULED
Qty: 30 CAPSULE | Refills: 0 | Status: SHIPPED | OUTPATIENT
Start: 2024-01-13 | End: 2024-01-23

## 2024-01-13 RX ORDER — PREDNISONE 10 MG/1
10 TABLET ORAL DAILY
COMMUNITY

## 2024-01-13 NOTE — CARE ANYWHERE EVISITS
Visit Summary for BETH LOCKWOOD - Gender: Female - Date of Birth: 1965  Date: [id number] - Duration: 10 minutes  Patient: BETH LOCKWOOD  Provider: Shannon Severino PA-C    Patient Contact Information  Address  [address] [place] [address]; [place] [address]  [phone]    Visit Topics    Triage Questions   What is your current physical address in the event of a medical emergency? Answer []  Are you allergic to any medications? Answer []  Are you now or could you be pregnant? Answer []  Do you have any immune system compromise or chronic lung   disease? Answer []  Do you have any vulnerable family members in the home (infant, pregnant, cancer, elderly)? Answer []     Conversation Transcripts  [0A][0A] [Notification] You are connected with Shannon Severino PA-C, Urgent Care Specialist.[0A][Notification] BETH LOCKWOOD is located in [place].[0A][Notification] BETH LOCKWOOD has shared health history...[0A]    Diagnosis  Periapical abscess without sinus    Procedures  Value: [id number] Code: CPT-4 UNLISTED E&M SERVICE    Medications Prescribed    No prescriptions ordered    Electronically signed by: Severino PA-C, Shannon(NPI [id number])

## 2024-01-13 NOTE — PATIENT INSTRUCTIONS
Gingivitis   WHAT YOU NEED TO KNOW:   Gingivitis is mild gum disease caused by plaque that builds up on your teeth and gums. Plaque contains bacteria that can irritate your gums and cause an infection.  DISCHARGE INSTRUCTIONS:   Return to the emergency department if:   You have new trouble swallowing.      Call your dentist if:   You have a fever.    Your gums bleed every time you brush or floss your teeth.    Your gums hurt when you touch them.    You cannot use a toothbrush or dental floss because of the pain.    You have a sore or lump on your gums that stays for over 3 weeks.    You have 1 or more loose teeth.    Your face or neck is swollen.    You have questions or concerns about your condition or care.    Prevent or manage gingivitis:  Gingivitis may lead to a more serious form of gum disease called periodontitis. Periodontitis can cause other dental problems, and you may even may even lose your teeth. Gingivitis may come back if you do not care for your teeth properly at home. The following can help prevent gingivitis or prevent it from getting worse between dental visits:  Brush your teeth 2 times each day to remove plaque.  Brush for at least 2 minutes. Use fluoride toothpaste. A battery-powered toothbrush may remove plaque better than a regular toothbrush. Your dentist can help you decide on the right toothbrush for you.    Floss your teeth at least 1 time each day.  Use dental floss to clean between and around each tooth.         Use dental rinse, if directed.  A dental rinse helps reduce plaque and decrease swollen gums. Ask your dentist which kind to use.    Get regular checkups.  See your dentist regularly for dental cleanings and oral exams.    Do not smoke.  Nicotine and other chemicals in cigarettes and cigars can prevent treatments for gum disease from working. Ask your healthcare provider for information if you currently smoke and need help to quit. E-cigarettes or smokeless tobacco still contain  nicotine. Talk to your provider before you use these products.    Follow up with your dentist as directed:  Write down your questions so you remember to ask them during your visits.  © Copyright [employer] 2023 Information is for End User's use only and may not be sold, redistributed or otherwise used for commercial purposes.  The above information is an  only. It is not intended as medical advice for individual conditions or treatments. Talk to your doctor, nurse or pharmacist before following any medical regimen to see if it is safe and effective for you.

## 2024-01-13 NOTE — PROGRESS NOTES
Required Documentation:  Encounter provider Shannon D Severino, PA-C    Provider located at [facility]  VIRTUAL CARE   [address] [id number]    Identify all parties in car with patient during virtual visit:  No one else    The patient was identified by name and date of birth. Kamilah Renteria was informed that this is a telemedicine visit and that the visit is being conducted through the [employer] [employer] platform. She agrees to proceed..  My office door was closed. No one else was in the room.  She acknowledged consent and understanding of privacy and security of the video platform. The patient has agreed to participate and understands they can discontinue the visit at any time.    Verification of patient location:    Patient is located at  [facility]  in the following state in which I hold an active license [place]    Patient is aware this is a billable service.     Reason for visit is No chief complaint on file.       Subjective  HPI   Pt reports a dental infection about 1 month ago, had a root canal was on clindamycin for 7 days. Reports gum swelling and increased pain starting again yesterday, worsening today. Took ibuprofen today with some relief. Denies fevers. Currently on prednisone and methotrexate. 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   HENT:  Positive for dental problem. Negative for nosebleeds.    Eyes:  Negative for redness.   Respiratory:  Negative for shortness of breath.    Cardiovascular:  Negative for chest pain.   Gastrointestinal:  Negative for blood in stool.   Genitourinary:  Negative for hematuria.   Musculoskeletal:  Negative for gait problem.   Skin:  Negative for rash.   Neurological:  Negative for seizures.   Psychiatric/Behavioral:  Negative for behavioral problems.        Video Exam    Vitals:    01/13/24 0957   Pulse: 84   Resp: 20       Physical Exam  Constitutional:       General: She is not in acute distress.     Appearance: Normal appearance. She is not toxic-appearing.   HENT:      Head: Normocephalic and atraumatic.      Nose: No rhinorrhea.      Mouth/Throat:      Mouth: Mucous membranes are moist.      Comments: Gum swelling and erythema between tooth #5-6  Eyes:      Conjunctiva/sclera: Conjunctivae normal.   Pulmonary:      Effort: Pulmonary effort is normal. No respiratory distress.      Breath sounds: No wheezing (no gross audible wheeze through computer).   Musculoskeletal:      Cervical back: Normal range of motion.   Skin:     Findings: No rash (on face or neck).   Neurological:      Mental Status: She is alert.      Cranial Nerves: No dysarthria or facial asymmetry.   Psychiatric:         Mood and Affect: Mood normal.         Behavior: Behavior normal.         Visit Time  Total Visit Duration: 10 minutes    Assessment/Plan:    Diagnoses and all orders for this visit:    Periapical abscess  -     clindamycin (CLEOCIN) 300 MG capsule; Take 1 capsule (300 mg total) by mouth every 8 (eight) hours for 10 days        Patient Instructions   Gingivitis   WHAT YOU NEED TO KNOW:   Gingivitis is mild gum disease caused by plaque that builds up on your teeth and gums. Plaque contains bacteria that can irritate your gums and cause an infection.  DISCHARGE INSTRUCTIONS:   Return to the emergency department if:    You have new trouble swallowing.      Call your dentist if:   You have a fever.    Your gums bleed every time you brush or floss your teeth.    Your gums hurt when you touch them.    You cannot use a toothbrush or dental floss because of the pain.    You have a sore or lump on your gums that stays for over 3 weeks.    You have 1 or more loose teeth.    Your face or neck is swollen.    You have questions or concerns about your condition or care.    Prevent or manage gingivitis:  Gingivitis may lead to a more serious form of gum disease called periodontitis. Periodontitis can cause other dental problems, and you may even may even lose your teeth. Gingivitis may come back if you do not care for your teeth properly at home. The following can help prevent gingivitis or prevent it from getting worse between dental visits:  Brush your teeth 2 times each day to remove plaque.  Brush for at least 2 minutes. Use fluoride toothpaste. A battery-powered toothbrush may remove plaque better than a regular toothbrush. Your dentist can help you decide on the right toothbrush for you.    Floss your teeth at least 1 time each day.  Use dental floss to clean between and around each tooth.         Use dental rinse, if directed.  A dental rinse helps reduce plaque and decrease swollen gums. Ask your dentist which kind to use.    Get regular checkups.  See your dentist regularly for dental cleanings and oral exams.    Do not smoke.  Nicotine and other chemicals in cigarettes and cigars can prevent treatments for gum disease from working. Ask your healthcare provider for information if you currently smoke and need help to quit. E-cigarettes or smokeless tobacco still contain nicotine. Talk to your provider before you use these products.    Follow up with your dentist as directed:  Write down your questions so you remember to ask them during your visits.  © Copyright [employer] 2023 Information is for End User's use only and may not be  sold, redistributed or otherwise used for commercial purposes.  The above information is an  only. It is not intended as medical advice for individual conditions or treatments. Talk to your doctor, nurse or pharmacist before following any medical regimen to see if it is safe and effective for you.

## 2024-01-21 DIAGNOSIS — F41.9 ANXIETY: ICD-10-CM

## 2024-01-22 RX ORDER — SERTRALINE HYDROCHLORIDE 100 MG/1
200 TABLET, FILM COATED ORAL DAILY
Qty: 180 TABLET | Refills: 1 | Status: SHIPPED | OUTPATIENT
Start: 2024-01-22

## 2024-02-06 ENCOUNTER — TELEPHONE (OUTPATIENT)
Dept: ADMINISTRATIVE | Facility: OTHER | Age: 59
End: 2024-02-06

## 2024-02-06 NOTE — TELEPHONE ENCOUNTER
Upon review of the In Basket request we were able to locate, review, and update the patient chart as requested for DEXA Scan.    Any additional questions or concerns should be emailed to the Practice Liaisons via the appropriate education email address, please do not reply via In Basket.    Thank you  Altagracia Doan MA

## 2024-02-06 NOTE — TELEPHONE ENCOUNTER
----- Message from KEIRA Marc sent at 2/6/2024 11:34 AM EST -----  Regarding: Care gap update  02/06/24 11:34 AM    Hello, our patient Kamilah Renteria has had osteoporosis screening completed/performed. Please assist in updating the patient chart by pulling the document from media Tab within Chart Review. The date of service is 02/05/2024.     Thank you,  Amber SOLIZ [facility]

## 2024-02-08 ENCOUNTER — OFFICE VISIT (OUTPATIENT)
Dept: FAMILY MEDICINE CLINIC | Facility: CLINIC | Age: 59
End: 2024-02-08

## 2024-02-08 VITALS
OXYGEN SATURATION: 96 % | TEMPERATURE: 97.6 F | BODY MASS INDEX: 39.44 KG/M2 | SYSTOLIC BLOOD PRESSURE: 128 MMHG | DIASTOLIC BLOOD PRESSURE: 76 MMHG | HEIGHT: 64 IN | HEART RATE: 82 BPM | RESPIRATION RATE: 16 BRPM | WEIGHT: 231 LBS

## 2024-02-08 DIAGNOSIS — U07.1 COVID: Primary | ICD-10-CM

## 2024-02-08 DIAGNOSIS — B34.9 VIRAL SYNDROME: ICD-10-CM

## 2024-02-08 LAB
S PYO AG THROAT QL: NEGATIVE
SARS-COV-2 AG UPPER RESP QL IA: POSITIVE
SL AMB POCT RAPID FLU A: NORMAL
SL AMB POCT RAPID FLU B: NORMAL
VALID CONTROL: ABNORMAL

## 2024-02-08 PROCEDURE — 99213 OFFICE O/P EST LOW 20 MIN: CPT | Performed by: NURSE PRACTITIONER

## 2024-02-08 PROCEDURE — 87811 SARS-COV-2 COVID19 W/OPTIC: CPT | Performed by: NURSE PRACTITIONER

## 2024-02-08 PROCEDURE — 87804 INFLUENZA ASSAY W/OPTIC: CPT | Performed by: NURSE PRACTITIONER

## 2024-02-08 PROCEDURE — 87880 STREP A ASSAY W/OPTIC: CPT | Performed by: NURSE PRACTITIONER

## 2024-02-08 NOTE — PATIENT INSTRUCTIONS
COVID-19 and Chronic Health Conditions   WHAT YOU NEED TO KNOW:   Your chronic condition may increase your risk for COVID-19 or serious problems it can cause. Healthcare providers might need to make changes that affect how you usually manage your chronic health condition. Providers may change hours of operation or not have patients come in to be seen. You may not be able to make appointments to get blood drawn or to have tests or procedures. This may continue until the virus that causes COVID-19 is controlled. Until then, you can take steps to manage your condition. The steps will also lower your risk for COVID-19 or the serious problems it causes. If you do develop COVID-19, healthcare providers will tell you when it is okay to be around others after you recover. This depends on your chronic condition, any symptoms of COVID-19 that developed, and how severe the symptoms were.  DISCHARGE INSTRUCTIONS:   Call your local emergency number (911 in the ) or an emergency department if:   You have trouble breathing or shortness of breath.    You have chest pain or pressure that lasts longer than 5 minutes.    You become confused or hard to wake.    Return to the emergency department if:   The skin on your face, fingers, or toes look blue or darker than usual.      Call your doctor or specialist if:   You have a fever.    You have symptoms of COVID-19.    You have questions or concerns about COVID-19 or your chronic condition.    What you need to know about serious problems from the virus:  You may develop long-term health problems caused by the virus. Your risk is higher if you are 65 or older. A weak immune system, obesity, diabetes, chronic kidney disease, or a heart or lung condition can also increase your risk. Your risk is also higher if you are a current or former cigarette smoker. COVID-19 can lead to any of the following:  Multisymptom inflammatory syndrome in adults (MIS-A) or in children (MIS-C), causing  inflammation in the heart, digestive system, skin, or brain    Shortness of breath, serious lower respiratory conditions, such as pneumonia or acute respiratory distress syndrome (ARDS)    Blood clots or blood vessel damage    Organ damage from a lack of oxygen or from blood clots    Sleep problems    Problems thinking clearly, remembering information, or concentrating    Mood changes, depression, or anxiety    Long-term problems tasting or smelling    Loss of appetite and weight loss    Nerve pain    Fatigue (feeling mentally and physically tired)    How the 2019 coronavirus spreads:  Close personal contact with an infected person increases your risk for infection. This means being within 6 feet (2 meters) of the person for at least 15 minutes over 24 hours.  The virus travels in droplets that form when a person talks, sings, coughs, or sneezes.  The droplets can also float in the air for minutes or hours. Infection happens when you breathe in the droplets or get them in your eyes or nose.    Person-to-person contact can spread the virus.  For example, a person with the virus on his or her hands can spread it by shaking hands with someone.    The virus can stay on objects and surfaces for hours to days.  You may become infected by touching the object or surface and then touching your eyes or mouth.    What you need to know about the signs and symptoms of COVID-19:  Signs and symptoms usually start about 5 days after infection but can take 2 to 14 days. Signs and symptoms range from mild to severe. You may feel like you have the flu or a bad cold. Your chronic health condition may cause some of the same symptoms COVID-19 causes. This can make it hard to know if a symptom is from COVID-19 or your chronic condition. Keep a record of any new or worsening symptom you have. This is especially important if you have a condition that often causes shortness of breath. Your provider can tell you if you should be tested for  COVID-19. Tell your healthcare provider if you think you were infected but develop signs or symptoms not listed below:  A cough    Shortness of breath or trouble breathing that may become severe    A fever of at least 100.4°F, or 38°C (may be lower in adults 65 or older)    Chills that might include shaking    Muscle pain, body aches, or a headache    A sore throat    Suddenly not being able to taste or smell anything    Feeling very tired (fatigue)    Congestion (stuffy head and nose), or a runny nose    Diarrhea, nausea, or vomiting    Manage your chronic health condition:  If you do not have a regular healthcare provider, experts recommend you contact a local [facility] health center or health department. The following can help you manage your condition and prevent COVID-19 during an active outbreak in your area:  Get emergency care for your condition if needed.  Talk to your healthcare providers about symptoms of your chronic condition that need immediate care. Your providers can help you create a plan or add exacerbation management to your plan. The plan will include when to go to an emergency department and when to call your local emergency number. This will depend on where you live and the services that are available.    Go to dialysis appointments as scheduled.  It is important to stay on schedule. You will need to have enough food to be able to follow the emergency diet plan if you must miss a session. The emergency diet needs to be part of the management plan for your condition.    Ask your healthcare provider for other ways to have appointments.  Some providers offer phone, video, or other types of appointments.    Follow any regular management plan you use.  Your healthcare provider will tell you if you need to make any changes to your regular management plan. For example, if you have asthma, continue to follow your asthma action plan. If you have diabetes, you may need to check your blood sugar level  more often. Stress and illness can make blood sugar levels go up. You may need to adjust medicine such as insulin. If you have a heart condition or high blood pressure, you may need to check your blood pressure more often. Stress and illness can also raise your blood pressure.    Talk to your healthcare providers about your medicines.  You may be able to get more than 1 month of medicine at a time. This will lower the number of times you need to go to a pharmacy to get your medicines. Make sure you have enough medicine if you have a condition that can lead to an emergency. Examples include asthma medicines, insulin, or an epinephrine pen. Check the expiration dates on the medicines you currently have. Ask for refills as soon as possible, if needed. If it is not time to refill prescriptions, you may be able to get an emergency supply of some medicines. Medicine plans vary, so ask your healthcare provider or pharmacist for options.    Have supplies available in your home or delivered as needed.  If possible, get extra supplies you use regularly. Examples include absorbent pads, syringes, and wound cleaning solutions. This will limit the number of trips out of your home to get supplies. Have food, medicines, and other supplies delivered. You can instead ask someone who does not have COVID-19 to get items you need.    What you need to know about COVID-19 vaccines:  Your healthcare provider can give you more information about what to expect, depending on your chronic condition. You are considered fully vaccinated against COVID-19 two weeks after the final dose of any COVID-19 vaccine. Let your healthcare provider know when you have received the final dose of the vaccine. Make a copy of your vaccination card. Keep the original with you in case you need to show it. Keep the copy in a safe place.  Get a COVID-19 vaccine as directed.  Vaccination is recommended for everyone 6 months or older. COVID-19 vaccines are given as a  shot in 1 to 3 doses as a primary series. This depends on the vaccine brand and the age of the person who receives it. A booster dose is recommended for everyone 5 years or older after the primary series is complete. A second booster  is recommended for all adults 50 or older and for immunocompromised adolescents. The second booster is also recommended for anyone who got the 1-dose brand of vaccine for the first dose and a booster. Your provider can give you more information on boosters and help you schedule all needed doses.         Continue to protect yourself and others.  You can become infected even after you get the vaccine. You may also be able to pass the virus to others without knowing you are infected.    After you get the vaccine, check local, national, and international travel rules.  You may need to be tested before you travel. Some countries require proof of a negative test before you travel. You may also need to quarantine after you return.    Medicine may be given to prevent infection.  The medicine can be given if you are at high risk for infection and cannot get the vaccine. It can also be given if your immune system does not respond well to the vaccine.    Lower your risk for COVID-19:   Stay home if you are sick or think you may have COVID-19.  It is important to stay home if you are waiting for a testing appointment or for test results.    Wash your hands often throughout the day.  Use soap and water. Rub your soapy hands together, lacing your fingers, for at least 20 seconds. Rinse with warm, running water. Dry your hands with a clean towel or paper towel. Use hand  that contains alcohol if soap and water are not available. Teach children how to wash their hands and use hand .         Cover sneezes and coughs.  Turn your face away and cover your mouth and nose with a tissue. Throw the tissue away. Use the bend of your arm if a tissue is not available. Then wash your hands with  soap and water or use hand . Teach children how to cover a cough or sneeze.    Wear a face covering (mask) when needed.  Use a cloth covering with at least 2 layers. You can also create layers by putting a cloth covering over a disposable non-medical mask. Cover your mouth and your nose.         Try to keep space between you and others when you are out of the house.  Avoid crowds as much as possible. Wear a face covering when you must be around a large group and cannot keep space between you and others.    Clean and disinfect high-touch surfaces and objects in your home often.  Use disinfecting wipes, or make a solution by mixing 4 teaspoons of bleach with 1 quart (4 cups) of water. Do not  use any cleaning or disinfecting products that can trigger an asthma attack or other breathing problems. Open windows or have circulating air as you clean. Do not  mix ammonia with bleach. This will create toxic fumes.       Help strengthen your immune system:   Ask about other vaccines you may need.  Get the influenza (flu) vaccine as soon as recommended each year, usually starting in September or October. Get the pneumonia vaccine if recommended. Your healthcare provider can tell you if you should also get other vaccines, and when to get them.    Do not smoke.  Nicotine and other chemicals in cigarettes and cigars can increase your risk for infection and for serious COVID-19 effects. Ask your healthcare provider for information if you currently smoke and need help to quit. E-cigarettes or smokeless tobacco still contain nicotine. Talk to your healthcare provider before you use these products.    Eat a variety of healthy foods.  Examples include vegetables, fruits, whole-grain breads and cereals, lean meats and poultry, fish, low-fat dairy products, and cooked beans. Healthy foods contain nutrients that help keep your immune system strong.         Find ways to manage stress.  Talk to your healthcare providers about ways  to manage stress. Stress can lead to breathing problems or trigger an attack or exacerbation of many health conditions. Do things you enjoy to help you relax. For example, talk to a friend, read a book or magazine, or listen to soothing music.    Follow up with your doctor or specialist as directed:  Your providers will tell you when you can come in for tests, procedures, or check-ups. Bring your symptom record with you to all appointments. Write down your questions so you remember to ask them during your visits.  For more information:   Centers for Disease Control and Prevention  [address] [place] [address]  Phone: 3- 599 - 9588831  Phone: 7- 522 - 7135557  Web Address: http://www.cdc.gov    © Copyright Merative 2023 Information is for End User's use only and may not be sold, redistributed or otherwise used for commercial purposes.  The above information is an  only. It is not intended as medical advice for individual conditions or treatments. Talk to your doctor, nurse or pharmacist before following any medical regimen to see if it is safe and effective for you.

## 2024-02-08 NOTE — PROGRESS NOTES
Name: Kamilah Renteria      : 1965      MRN: [id number]  Encounter Provider: KEIRA Marc  Encounter Date: 2024   Encounter department: [facility] IN PARTNERSHIP WITH [facility]    Assessment & Plan     1. COVID  Assessment & Plan:  Patient positive for COVID today.  We discussed in length antiviral treatment such as Paxlovid, at this time patient feels symptoms are mild, and would like to defer Paxlovid.  Patient advised she has 5 days to start Paxlovid.  We discussed CDC guidelines for quarantine.  Work note provided today.  We discussed vitamin D, vitamin C, and zinc.  Patient advised on symptoms which require more emergent follow-up.  Advised on symptomatic treatment at this time.  Patient verbalized understanding and is in agreement with plan.    Orders:  -     Poct Covid 19 Rapid Antigen Test    2. Viral syndrome  -     Poct Covid 19 Rapid Antigen Test  -     POCT rapid flu A and B  -     POCT rapid ANTIGEN strepA           Subjective      Woke up yesterday sore throat, felt feverish, productive cough, hoarse voice, sinus pressure, congestion, no ear pain.   No n/v/d/ear pain , fevers  Was just on abx for tooth infection from oral surery.     Cough      Review of Systems   Respiratory:  Positive for cough.        Current Outpatient Medications on File Prior to Visit   Medication Sig   • atorvastatin (LIPITOR) 80 mg tablet TAKE 1 TABLET DAILY   • Bacillus Coagulans-Inulin (Probiotic) 1-250 BILLION-MG CAPS Start: 06/15/23 10:34:00 EDT, PO, 1 Unknown, 1 Refill(s), Take 1 each by mouth daily   • Calcium Carb-Cholecalciferol (Calcium/Vitamin D) 600-400 MG-UNIT TABS Take 1 tablet by mouth 2 (two) times a day   • Calcium Citrate-Vitamin D 250-5 MG-MCG TABS Start: 06/15/23 10:31:00 EDT, PO, 1 Unknown, 1 Refill(s), Take 1 tablet by mouth 2 (two) times a day   • diphenhydrAMINE (BENADRYL) 25 mg tablet    • Farxiga 10 MG tablet Take 1 tablet (10 mg total) by  "mouth daily   • fexofenadine (ALLEGRA) 180 MG tablet Take 180 mg by mouth daily PRN   • fluticasone (FLONASE) 50 mcg/act nasal spray    • folic acid (FOLVITE) 1 mg tablet Take 1,000 mcg by mouth daily   • hydrochlorothiazide (HYDRODIURIL) 25 mg tablet Take 0.5 tablets (12.5 mg total) by mouth daily   • hydrOXYzine HCL (ATARAX) 10 mg tablet Take 1 tablet (10 mg total) by mouth every 6 (six) hours as needed for anxiety   • Kevzara 150 MG/1.14ML SOAJ    • levothyroxine 150 mcg tablet Take 1 tablet (150 mcg total) by mouth daily   • lisinopril (ZESTRIL) 40 mg tablet TAKE 1 TABLET DAILY   • Magnesium 400 MG CAPS Take 1 each by mouth daily   • methotrexate 2.5 MG tablet Take 8 tablets by mouth once a week   • metroNIDAZOLE (METROCREAM) 0.75 % cream    • Misc Natural Products (FIBER SUPREME PO) Take 1 Scoop by mouth daily   • omeprazole (PriLOSEC) 20 mg delayed release capsule TAKE 1 CAPSULE DAILY   • predniSONE 10 mg tablet Take 10 mg by mouth daily Tapering down- 4 more weeks- for polymyaglia rheumatica   • Probiotic Product (CULTURELLE PROBIOTICS PO) Take 1 each by mouth daily   • sertraline (ZOLOFT) 100 mg tablet Take 2 tablets (200 mg total) by mouth daily       Objective     /76 (BP Location: Left arm, Patient Position: Sitting, Cuff Size: Large)   Pulse 82   Temp 97.6 °F (36.4 °C) (Tympanic)   Resp 16   Ht 5' 4\" (1.626 m)   Wt 105 kg (231 lb)   SpO2 96%   BMI 39.65 kg/m²     Physical Exam  KEIRA Marc    "

## 2024-02-08 NOTE — ASSESSMENT & PLAN NOTE
Patient positive for COVID today.  We discussed in length antiviral treatment such as Paxlovid, at this time patient feels symptoms are mild, and would like to defer Paxlovid.  Patient advised she has 5 days to start Paxlovid.  We discussed CDC guidelines for quarantine.  Work note provided today.  We discussed vitamin D, vitamin C, and zinc.  Patient advised on symptoms which require more emergent follow-up.  Advised on symptomatic treatment at this time.  Patient verbalized understanding and is in agreement with plan.

## 2024-02-08 NOTE — LETTER
February 8, 2024     Patient: Kamilah Renteria  YOB: 1965  Date of Visit: 2/8/2024      To Whom it May Concern:    Kamilah Renteria is under my professional care. Kamilah was seen in my office on 2/8/2024. Kamilah may return to work on 02/12/2024 .    If you have any questions or concerns, please don't hesitate to call.         Sincerely,          KEIRA Marc        CC: No Recipients

## 2024-02-08 NOTE — PROGRESS NOTES
COVID-19 Outpatient Progress Note    Assessment/Plan:    Problem List Items Addressed This Visit        Other    COVID - Primary     Patient positive for COVID today.  We discussed in length antiviral treatment such as Paxlovid, at this time patient feels symptoms are mild, and would like to defer Paxlovid.  Patient advised she has 5 days to start Paxlovid.  We discussed CDC guidelines for quarantine.  Work note provided today.  We discussed vitamin D, vitamin C, and zinc.  Patient advised on symptoms which require more emergent follow-up.  Advised on symptomatic treatment at this time.  Patient verbalized understanding and is in agreement with plan.         Relevant Orders    Poct Covid 19 Rapid Antigen Test (Completed)    Viral syndrome    Relevant Orders    Poct Covid 19 Rapid Antigen Test (Completed)    POCT rapid flu A and B (Completed)    POCT rapid ANTIGEN strepA (Completed)        Disposition:     Patient with asymptomatic/mild COVID-19: They were recommended to isolate for at least 5 days (day 0 is the day symptoms appeared or the date the specimen was collected for the positive test for people who are asymptomatic). If they are asymptomatic or symptoms are improving with no fevers in the past 24 hours, isolation may be ended followed by 5 days of wearing a high quality mask when around others to minimize risk of infecting others. They should wear a mask through day 10 and a test-based strategy may be used to remove a mask sooner.  If COVID symptoms worsen after ending isolation, restart isolation at day 0.     Discussed symptom directed medication options with patient. Discussed vitamin D, vitamin C, and/or zinc supplementation with patient.     I have spent a total time of 20 minutes on the day of the encounter for this patient including discussing prognosis, risks and benefits of treatment options, impressions and reviewing/ordering tests, medicine, procedures.       Encounter provider: Amber Garcia  KEIRA Arias     Provider located at: [facility]NERSHIP WITH [facility]  [address]  [facility] [address]     Recent Visits  No visits were found meeting these conditions.  Showing recent visits within past 7 days and meeting all other requirements  Today's Visits  Date Type Provider Dept   02/08/24 Office Visit KEIRA Marc [facility]   Showing today's visits and meeting all other requirements  Future Appointments  No visits were found meeting these conditions.  Showing future appointments within next 150 days and meeting all other requirements     Subjective:   Kamilah Renteria is a 58 y.o. female who is concerned about COVID-19. Patient's symptoms include nasal congestion, sore throat and cough (productive). Patient denies fever, chills, fatigue, malaise, rhinorrhea, anosmia, loss of taste, shortness of breath, chest tightness, abdominal pain, nausea, vomiting, diarrhea, myalgias and headaches.     - Date of symptom onset: 2/7/2024      COVID-19 vaccination status: Fully vaccinated with booster    Patient here today with concerns of cough, hoarse voice, sinus pressure, which occurred yesterday.  Patient denies ear pain, nausea, vomiting, fevers.  Patient reports she just completed azithromycin which was ordered by her oral surgeon for tooth extraction.     Lab Results   Component Value Date    SARSCOV2 Negative 10/24/2021    SARSCOV2 Not Detected 12/21/2020    SARSCOVAG Positive (A) 02/08/2024       Review of Systems   Constitutional: Negative.  Negative for chills, fatigue and fever.   HENT:  Positive for congestion, sinus pressure, sinus pain, sore throat and voice change. Negative for ear discharge, ear pain, facial swelling, rhinorrhea, tinnitus and trouble swallowing.    Eyes: Negative.  Negative for discharge.   Respiratory:  Positive for cough (productive). Negative for apnea,  choking, chest tightness, shortness of breath, wheezing and stridor.    Gastrointestinal: Negative.  Negative for abdominal pain, diarrhea, nausea and vomiting.   Musculoskeletal: Negative.  Negative for myalgias.   Skin:  Negative for rash.   Neurological: Negative.  Negative for headaches.     Current Outpatient Medications on File Prior to Visit   Medication Sig   • atorvastatin (LIPITOR) 80 mg tablet TAKE 1 TABLET DAILY   • Bacillus Coagulans-Inulin (Probiotic) 1-250 BILLION-MG CAPS Start: 06/15/23 10:34:00 EDT, PO, 1 Unknown, 1 Refill(s), Take 1 each by mouth daily   • Calcium Carb-Cholecalciferol (Calcium/Vitamin D) 600-400 MG-UNIT TABS Take 1 tablet by mouth 2 (two) times a day   • Calcium Citrate-Vitamin D 250-5 MG-MCG TABS Start: 06/15/23 10:31:00 EDT, PO, 1 Unknown, 1 Refill(s), Take 1 tablet by mouth 2 (two) times a day   • diphenhydrAMINE (BENADRYL) 25 mg tablet    • Farxiga 10 MG tablet Take 1 tablet (10 mg total) by mouth daily   • fexofenadine (ALLEGRA) 180 MG tablet Take 180 mg by mouth daily PRN   • fluticasone (FLONASE) 50 mcg/act nasal spray    • folic acid (FOLVITE) 1 mg tablet Take 1,000 mcg by mouth daily   • hydrochlorothiazide (HYDRODIURIL) 25 mg tablet Take 0.5 tablets (12.5 mg total) by mouth daily   • hydrOXYzine HCL (ATARAX) 10 mg tablet Take 1 tablet (10 mg total) by mouth every 6 (six) hours as needed for anxiety   • Kevzara 150 MG/1.14ML SOAJ    • levothyroxine 150 mcg tablet Take 1 tablet (150 mcg total) by mouth daily   • lisinopril (ZESTRIL) 40 mg tablet TAKE 1 TABLET DAILY   • Magnesium 400 MG CAPS Take 1 each by mouth daily   • methotrexate 2.5 MG tablet Take 8 tablets by mouth once a week   • metroNIDAZOLE (METROCREAM) 0.75 % cream    • Misc Natural Products (FIBER SUPREME PO) Take 1 Scoop by mouth daily   • omeprazole (PriLOSEC) 20 mg delayed release capsule TAKE 1 CAPSULE DAILY   • predniSONE 10 mg tablet Take 10 mg by mouth daily Tapering down- 4 more weeks- for polymyaglia  "rheumatica   • Probiotic Product (CULTURELLE PROBIOTICS PO) Take 1 each by mouth daily   • sertraline (ZOLOFT) 100 mg tablet Take 2 tablets (200 mg total) by mouth daily       Objective:    /76 (BP Location: Left arm, Patient Position: Sitting, Cuff Size: Large)   Pulse 82   Temp 97.6 °F (36.4 °C) (Tympanic)   Resp 16   Ht 5' 4\" (1.626 m)   Wt 105 kg (231 lb)   SpO2 96%   BMI 39.65 kg/m²        Physical Exam  Vitals and nursing note reviewed.   Constitutional:       General: She is not in acute distress.     Appearance: Normal appearance. She is not ill-appearing.   HENT:      Head: Normocephalic and atraumatic.      Nose: Nose normal.   Eyes:      General:         Right eye: No discharge.         Left eye: No discharge.      Conjunctiva/sclera: Conjunctivae normal.   Cardiovascular:      Rate and Rhythm: Normal rate and regular rhythm.      Heart sounds: Normal heart sounds.   Pulmonary:      Effort: Pulmonary effort is normal. No respiratory distress.      Breath sounds: Normal breath sounds. No stridor. No wheezing, rhonchi or rales.   Chest:      Chest wall: No tenderness.   Abdominal:      General: Bowel sounds are normal.      Palpations: Abdomen is soft.   Musculoskeletal:         General: Normal range of motion.      Cervical back: Normal range of motion.      Right lower leg: No edema.      Left lower leg: No edema.   Lymphadenopathy:      Cervical: No cervical adenopathy.   Skin:     General: Skin is warm and dry.   Neurological:      Mental Status: She is alert and oriented to person, place, and time.   Psychiatric:         Mood and Affect: Mood normal.         Behavior: Behavior normal.         Thought Content: Thought content normal.         Judgment: Judgment normal.       KEIRA Marc    "

## 2024-05-18 LAB — HBA1C MFR BLD HPLC: 6 %

## 2024-05-20 ENCOUNTER — TELEPHONE (OUTPATIENT)
Age: 59
End: 2024-05-20

## 2024-05-20 ENCOUNTER — PATIENT MESSAGE (OUTPATIENT)
Dept: ENDOCRINOLOGY | Facility: CLINIC | Age: 59
End: 2024-05-20

## 2024-05-20 DIAGNOSIS — E11.9 TYPE 2 DIABETES MELLITUS WITHOUT COMPLICATION, WITHOUT LONG-TERM CURRENT USE OF INSULIN (HCC): ICD-10-CM

## 2024-05-20 DIAGNOSIS — C73 THYROID CANCER (HCC): ICD-10-CM

## 2024-05-20 RX ORDER — DAPAGLIFLOZIN 10 MG/1
10 TABLET, FILM COATED ORAL DAILY
Qty: 90 TABLET | Refills: 1 | Status: SHIPPED | OUTPATIENT
Start: 2024-05-20

## 2024-05-20 RX ORDER — LEVOTHYROXINE SODIUM 0.15 MG/1
150 TABLET ORAL DAILY
Qty: 90 TABLET | Refills: 1 | Status: SHIPPED | OUTPATIENT
Start: 2024-05-20

## 2024-05-20 NOTE — TELEPHONE ENCOUNTER
Patient was calling in regards to her lab work from another provider. She said her rheumatologist told her to Call Dr. Valencia to see if he can look at her TSH level, and call the patient with an update.

## 2024-05-29 DIAGNOSIS — R53.83 OTHER FATIGUE: Primary | ICD-10-CM

## 2024-06-03 ENCOUNTER — RA CDI HCC (OUTPATIENT)
Dept: OTHER | Facility: HOSPITAL | Age: 59
End: 2024-06-03

## 2024-06-04 LAB
25(OH)D3 SERPL-MCNC: 45 NG/ML (ref 30–100)
B19V IGG SER IA-ACNC: 4.8
B19V IGM SER IA-ACNC: 0.2
BASOPHILS # BLD AUTO: 42 CELLS/UL (ref 0–200)
BASOPHILS NFR BLD AUTO: 0.6 %
EOSINOPHIL # BLD AUTO: 133 CELLS/UL (ref 15–500)
EOSINOPHIL NFR BLD AUTO: 1.9 %
ERYTHROCYTE [DISTWIDTH] IN BLOOD BY AUTOMATED COUNT: 12.9 % (ref 11–15)
FERRITIN SERPL-MCNC: 54 NG/ML (ref 16–232)
FOLATE SERPL-MCNC: 12.2 NG/ML
HCT VFR BLD AUTO: 41.8 % (ref 35–45)
HGB BLD-MCNC: 13.9 G/DL (ref 11.7–15.5)
IRON SATN MFR SERPL: 22 % (CALC) (ref 16–45)
IRON SERPL-MCNC: 74 MCG/DL (ref 45–160)
LYMPHOCYTES # BLD AUTO: 1379 CELLS/UL (ref 850–3900)
LYMPHOCYTES NFR BLD AUTO: 19.7 %
MAGNESIUM SERPL-MCNC: 2 MG/DL (ref 1.5–2.5)
MCH RBC QN AUTO: 35.5 PG (ref 27–33)
MCHC RBC AUTO-ENTMCNC: 33.3 G/DL (ref 32–36)
MCV RBC AUTO: 106.9 FL (ref 80–100)
MONOCYTES # BLD AUTO: 427 CELLS/UL (ref 200–950)
MONOCYTES NFR BLD AUTO: 6.1 %
NEUTROPHILS # BLD AUTO: 5019 CELLS/UL (ref 1500–7800)
NEUTROPHILS NFR BLD AUTO: 71.7 %
PLATELET # BLD AUTO: 240 THOUSAND/UL (ref 140–400)
PMV BLD REES-ECKER: 11.4 FL (ref 7.5–12.5)
RBC # BLD AUTO: 3.91 MILLION/UL (ref 3.8–5.1)
TIBC SERPL-MCNC: 332 MCG/DL (CALC) (ref 250–450)
VIT B12 SERPL-MCNC: 365 PG/ML (ref 200–1100)
WBC # BLD AUTO: 7 THOUSAND/UL (ref 3.8–10.8)
ZINC SERPL-MCNC: 71 MCG/DL (ref 60–130)

## 2024-06-07 ENCOUNTER — OFFICE VISIT (OUTPATIENT)
Dept: ENDOCRINOLOGY | Facility: CLINIC | Age: 59
End: 2024-06-07
Payer: COMMERCIAL

## 2024-06-07 ENCOUNTER — TELEPHONE (OUTPATIENT)
Dept: ADMINISTRATIVE | Facility: OTHER | Age: 59
End: 2024-06-07

## 2024-06-07 ENCOUNTER — OFFICE VISIT (OUTPATIENT)
Age: 59
End: 2024-06-07

## 2024-06-07 ENCOUNTER — PATIENT MESSAGE (OUTPATIENT)
Dept: ENDOCRINOLOGY | Facility: CLINIC | Age: 59
End: 2024-06-07

## 2024-06-07 VITALS
DIASTOLIC BLOOD PRESSURE: 82 MMHG | HEIGHT: 63 IN | HEART RATE: 103 BPM | WEIGHT: 233 LBS | BODY MASS INDEX: 41.29 KG/M2 | OXYGEN SATURATION: 97 % | SYSTOLIC BLOOD PRESSURE: 132 MMHG

## 2024-06-07 VITALS
SYSTOLIC BLOOD PRESSURE: 132 MMHG | OXYGEN SATURATION: 99 % | WEIGHT: 232 LBS | BODY MASS INDEX: 41.11 KG/M2 | HEIGHT: 63 IN | DIASTOLIC BLOOD PRESSURE: 82 MMHG | HEART RATE: 92 BPM

## 2024-06-07 DIAGNOSIS — C73 THYROID CANCER (HCC): ICD-10-CM

## 2024-06-07 DIAGNOSIS — E11.9 TYPE 2 DIABETES MELLITUS WITHOUT COMPLICATION, WITHOUT LONG-TERM CURRENT USE OF INSULIN (HCC): Primary | ICD-10-CM

## 2024-06-07 DIAGNOSIS — F17.211 CIGARETTE NICOTINE DEPENDENCE IN REMISSION: ICD-10-CM

## 2024-06-07 DIAGNOSIS — G47.33 OBSTRUCTIVE SLEEP APNEA SYNDROME: ICD-10-CM

## 2024-06-07 DIAGNOSIS — F41.9 ANXIETY: ICD-10-CM

## 2024-06-07 DIAGNOSIS — R20.2 LEFT HAND PARESTHESIA: ICD-10-CM

## 2024-06-07 DIAGNOSIS — Z12.2 SCREENING FOR LUNG CANCER: ICD-10-CM

## 2024-06-07 DIAGNOSIS — I10 PRIMARY HYPERTENSION: ICD-10-CM

## 2024-06-07 DIAGNOSIS — M35.3 POLYMYALGIA RHEUMATICA (HCC): ICD-10-CM

## 2024-06-07 DIAGNOSIS — E89.0 POSTOPERATIVE HYPOTHYROIDISM: ICD-10-CM

## 2024-06-07 PROBLEM — R53.83 OTHER FATIGUE: Status: RESOLVED | Noted: 2023-07-20 | Resolved: 2024-06-07

## 2024-06-07 PROBLEM — B34.9 VIRAL SYNDROME: Status: RESOLVED | Noted: 2024-02-08 | Resolved: 2024-06-07

## 2024-06-07 LAB — SL AMB POCT HEMOGLOBIN AIC: 6 (ref ?–6.5)

## 2024-06-07 PROCEDURE — 99214 OFFICE O/P EST MOD 30 MIN: CPT | Performed by: NURSE PRACTITIONER

## 2024-06-07 PROCEDURE — 99214 OFFICE O/P EST MOD 30 MIN: CPT | Performed by: STUDENT IN AN ORGANIZED HEALTH CARE EDUCATION/TRAINING PROGRAM

## 2024-06-07 PROCEDURE — 83036 HEMOGLOBIN GLYCOSYLATED A1C: CPT | Performed by: NURSE PRACTITIONER

## 2024-06-07 RX ORDER — LEVOTHYROXINE SODIUM 175 UG/1
TABLET ORAL
Qty: 90 TABLET | Refills: 3 | Status: SHIPPED | OUTPATIENT
Start: 2024-06-07

## 2024-06-07 NOTE — ASSESSMENT & PLAN NOTE
Pt reports experiencing popping in her left hand for 2 months which resolved but she is now experiencing tingling/numbness in the palm of her left hand. Exam shows neurovascularly intact. She denies injury to area. Will refer to hand specialist.

## 2024-06-07 NOTE — PROGRESS NOTES
Kamilah Renteria 58 y.o. female MRN: [id number]    Encounter: [id number]      Assessment & Plan     1. Papillary thyroid cancer - low grade PTC 2cm on pathology March 2016.  No evidence of disease based on recent labs and imaging. Tsh is elevated >10. Increase LT4 175 mcg daily. Repeat TFTs in 3-mo for monitoring.      2. Post-operative hypothyroidism - TSH is high. Increase LT4 as above. Will follow up levels for normal TSH goal 0.5 - 2.5.      3. Type 2 diabetes - well controlled on farxiga monotherapy. A1c 6%. Patient has goal for weight loss. We discussed transition to GLP therapy. Pro/cons reviewed. No history of pancreatitis. No personal history of medullary thyroid cancer. Will rx mounjaro 2.5 mg weekly x4 weeks, increase to 5 mg weekly as tolerated.      4. Hyperlipidemia - continue statin Rx. Will update lipid panel in 6-months     5. Hypertension - at goal.     6. Polymyalgia rheumatica - following with rheum    Problem List Items Addressed This Visit     Type 2 diabetes mellitus (HCC) - Primary    Relevant Medications    tirzepatide (Mounjaro) 2.5 MG/0.5ML    levothyroxine 175 mcg tablet    Other Relevant Orders    Hemoglobin A1C    Hypertension    Thyroid cancer (HCC)    Relevant Medications    levothyroxine 175 mcg tablet    Other Relevant Orders    TSH, 3rd generation    T4, free   Other Visit Diagnoses     Postoperative hypothyroidism        Relevant Medications    levothyroxine 175 mcg tablet    Other Relevant Orders    TSH, 3rd generation    T4, free        RTC 3-mo    CC: type 2 diabetes, post-operative hypothyroidism    History of Present Illness     HPI:    Leana returns today in follow-up of type 2 diabetes and postoperative hypothyroidism secondary to thyroid cancer.    Leana has no significant interval changes in health. She received prednisone for PMR, which is improving. Her blood sugars have remained stable. However, she is concerned about weight and fatigue, which has been worsening.     For  diabetes, she takes farxiga 10 mg daily. She is up to date on screening examinations.     She has formulary options to review.     For thyroid cancer, Leana has history of low risk PTC 2 cm s/p thyroidectomy .     For hypothyroidism, she is taking levothyroxine 150 mcg daily. She denies any neck complaints or hyperthyroid symptoms.     There is no history of pancreatitis or personal or family history of MTC.     Review of Systems   Constitutional:  Positive for fatigue and unexpected weight change. Negative for diaphoresis.   HENT:  Negative for trouble swallowing and voice change.    Gastrointestinal:  Negative for nausea and vomiting.   Endocrine: Negative for heat intolerance, polydipsia and polyuria.   Musculoskeletal:  Positive for arthralgias.   Neurological:  Negative for tremors and weakness.       Historical Information   Past Medical History:   Diagnosis Date   • Allergic    • Anxiety    • Cancer (HCC)    • Disease of thyroid gland    • High cholesterol    • Hypertension    • Hypothyroid    • Primary iridocyclitis of left eye 2023   • Thyroid cancer (HCC)      Past Surgical History:   Procedure Laterality Date   • BACK SURGERY     • BREAST EXCISIONAL BIOPSY Bilateral     benign - over 35 years   • CARPAL TUNNEL RELEASE Right    • ENDOMETRIAL ABLATION     • HYSTERECTOMY     • LAMINECTOMY     • TOTAL VAGINAL HYSTERECTOMY       Social History   Social History     Substance and Sexual Activity   Alcohol Use Yes   • Alcohol/week: 4.0 standard drinks of alcohol   • Types: 4 Shots of liquor per week     Social History     Substance and Sexual Activity   Drug Use Not Currently   • Types: Marijuana    Comment: Had Medical Card for Anxiety     Social History     Tobacco Use   Smoking Status Former   • Current packs/day: 0.00   • Average packs/day: 1 pack/day for 35.0 years (35.0 ttl pk-yrs)   • Types: Cigarettes   • Start date: 1980   • Quit date: 2011   • Years since quittin.1    Smokeless Tobacco Never   Tobacco Comments    QUIT 2010     Family History:   Family History   Problem Relation Age of Onset   • No Known Problems Mother    • No Known Problems Father    • Cancer Sister         Liver Cancer   • Liver cancer Sister    • Diabetes Sister         Type 2   • No Known Problems Maternal Grandmother    • No Known Problems Maternal Grandfather    • No Known Problems Paternal Grandmother    • No Known Problems Paternal Grandfather    • No Known Problems Maternal Aunt    • No Known Problems Maternal Aunt    • No Known Problems Maternal Aunt    • No Known Problems Maternal Aunt    • No Known Problems Maternal Aunt    • Cancer Brother         Leukemia       Meds/Allergies   Current Outpatient Medications   Medication Sig Dispense Refill   • atorvastatin (LIPITOR) 80 mg tablet TAKE 1 TABLET DAILY 90 tablet 3   • Bacillus Coagulans-Inulin (Probiotic) 1-250 BILLION-MG CAPS Start: 06/15/23 10:34:00 EDT, PO, 1 Unknown, 1 Refill(s), Take 1 each by mouth daily     • Calcium Carb-Cholecalciferol (Calcium/Vitamin D) 600-400 MG-UNIT TABS Take 1 tablet by mouth 2 (two) times a day     • Calcium Citrate-Vitamin D 250-5 MG-MCG TABS Start: 06/15/23 10:31:00 EDT, PO, 1 Unknown, 1 Refill(s), Take 1 tablet by mouth 2 (two) times a day     • diphenhydrAMINE (BENADRYL) 25 mg tablet      • Farxiga 10 MG tablet TAKE 1 TABLET DAILY 90 tablet 1   • fexofenadine (ALLEGRA) 180 MG tablet Take 180 mg by mouth daily PRN     • fluticasone (FLONASE) 50 mcg/act nasal spray      • folic acid (FOLVITE) 1 mg tablet Take 1,000 mcg by mouth daily     • hydrochlorothiazide (HYDRODIURIL) 25 mg tablet Take 0.5 tablets (12.5 mg total) by mouth daily 30 tablet 3   • hydrOXYzine HCL (ATARAX) 10 mg tablet Take 1 tablet (10 mg total) by mouth every 6 (six) hours as needed for anxiety 30 tablet 1   • levothyroxine 175 mcg tablet Take one tablet daily on empty stomach 1 hour before breakfast and 4 hours apart from calcium and vitamin D  "supplementation 90 tablet 3   • lisinopril (ZESTRIL) 40 mg tablet TAKE 1 TABLET DAILY 90 tablet 3   • Magnesium 400 MG CAPS Take 1 each by mouth daily     • methotrexate 2.5 MG tablet Take 8 tablets by mouth once a week     • metroNIDAZOLE (METROCREAM) 0.75 % cream      • Misc Natural Products (FIBER SUPREME PO) Take 1 Scoop by mouth daily     • omeprazole (PriLOSEC) 20 mg delayed release capsule TAKE 1 CAPSULE DAILY 90 capsule 3   • sertraline (ZOLOFT) 100 mg tablet Take 2 tablets (200 mg total) by mouth daily 180 tablet 1   • tirzepatide (Mounjaro) 2.5 MG/0.5ML Inject 0.5 mL (2.5 mg total) under the skin every 7 days 2 mL 0   • Probiotic Product (CULTURELLE PROBIOTICS PO) Take 1 each by mouth daily (Patient not taking: Reported on 6/7/2024)       No current facility-administered medications for this visit.     Allergies   Allergen Reactions   • Penicillin G Rash   • Penicillins Anaphylaxis   • Sulfamethoxazole-Trimethoprim Diarrhea and GI Intolerance   • Tetracycline Swelling       Objective   Vitals: Blood pressure 132/82, pulse 103, height 5' 3\" (1.6 m), weight 106 kg (233 lb), SpO2 97%.    Physical Exam  Vitals reviewed.   Constitutional:       Appearance: Normal appearance.   HENT:      Head: Normocephalic and atraumatic.      Nose: Nose normal.   Eyes:      General: No scleral icterus.     Conjunctiva/sclera: Conjunctivae normal.   Neck:      Thyroid: No thyroid mass.      Comments: No palpable tissue in thyroid bed  Pulmonary:      Effort: Pulmonary effort is normal. No respiratory distress.   Musculoskeletal:      Cervical back: Normal range of motion.      Right lower leg: No edema.      Left lower leg: No edema.   Lymphadenopathy:      Cervical: No cervical adenopathy.   Neurological:      General: No focal deficit present.      Mental Status: She is alert.   Psychiatric:         Mood and Affect: Mood normal.         Behavior: Behavior normal.         The history was obtained from the review of the chart, " "patient.    Lab Results:   Lab Results   Component Value Date/Time    Free t4 1.2 11/11/2023 07:05 AM                 Imaging Studies:   Results for orders placed in visit on 05/15/23    US thyroid    5/13/2023  NECK ULTRASOUND     INDICATION:     C73: Malignant neoplasm of thyroid gland.     COMPARISON: Thyroid ultrasound June 25, 2021     FINDINGS:     Ultrasound of the thyroidectomy bed and cervical lymph node chains was performed with a high frequency linear transducer.     There is no suspicion of recurrent mass in the thyroidectomy bed.     Lymph nodes are similar in appearance to prior study, and maintain normal morphologic contour, echogenicity and short axis dimensions of up to 0.7 cm.  No evidence for microcalcification or focal nodularity.     IMPRESSION:     No evidence of recurrent or metastatic disease.       I have personally reviewed pertinent reports.      Portions of the record may have been created with voice recognition software. Occasional wrong word or \"sound a like\" substitutions may have occurred due to the inherent limitations of voice recognition software. Read the chart carefully and recognize, using context, where substitutions have occurred.  "

## 2024-06-07 NOTE — ASSESSMENT & PLAN NOTE
Well controlled in office.  Discussed low salt diet, increasing exercise to 30 mins 3-4x a week. Check home BP and record for next visit. BP goal <140/90. Discussed ER precautions for chest pain, stroke precautions, or BP of 180/120 or greater (hypertensive crisis), change in LOC or worsening symptoms. Call with new or worsening symptoms.   If you have numbness, tingling, weakness, or severe headache with elevated BP go to the ED.

## 2024-06-07 NOTE — TELEPHONE ENCOUNTER
----- Message from KEIRA Evangelista sent at 6/7/2024 10:04 AM EDT -----  Regarding: Care Gap Outreach  06/07/24 10:04 AM    Hello, our patient Kamilah Renteria has had Diabetic Eye Exam completed/performed. Please assist in updating the patient chart by making an External outreach to [facility] located in [place], [place]. The date of service is ?.    Thank you,  Amber SOLIZ [facility]

## 2024-06-07 NOTE — PROGRESS NOTES
Ambulatory Visit  Name: Kamilah Renteria      : 1965      MRN: [id number]  Encounter Provider: KEIRA Marc  Encounter Date: 2024   Encounter department: [facility] IN PARTNERSHIP WITH [facility]    Assessment & Plan   1. Type 2 diabetes mellitus without complication, without long-term current use of insulin (HCC)  Assessment & Plan:  Pt is following endocrinology. Microalbumin/creat level checked in office today. Foot exam WNL.   Lab Results   Component Value Date    HGBA1C 6.0 2024     Orders:  -     POCT hemoglobin A1c  -     Microalbumin, Random Urine (W/Creatinine)  2. BMI 40.0-44.9, adult ([facility])  Assessment & Plan:  Goal to consume 500 to 1,000 fewer calories per day for a 1-2 lbs weight loss per week. Increase exercise to 30 min, 5 times a week as tolerated for a goal of 150 mins a week. Calorie tracking apps such as myfitness pal can be helpful for keeping track of calorie intake. Decrease simple carbohydrates (white bread, pasta, white rice), and increasing vegetables, fruit, and protein.  Increase water.    3. Thyroid cancer (HCC)  Assessment & Plan:  Pt is following endocrinology.   4. Polymyalgia rheumatica (HCC)  Assessment & Plan:  Following [facility] rheumatology.   5. Left hand paresthesia  Assessment & Plan:  Pt reports experiencing popping in her left hand for 2 months which resolved but she is now experiencing tingling/numbness in the palm of her left hand. Exam shows neurovascularly intact. She denies injury to area. Will refer to hand specialist.   Orders:  -     Ambulatory Referral to Orthopedic Surgery; Future  6. Primary hypertension  Assessment & Plan:  Well controlled in office.  Discussed low salt diet, increasing exercise to 30 mins 3-4x a week. Check home BP and record for next visit. BP goal <140/90. Discussed ER precautions for chest pain, stroke precautions, or BP of 180/120 or greater (hypertensive crisis), change in LOC or  worsening symptoms. Call with new or worsening symptoms.   If you have numbness, tingling, weakness, or severe headache with elevated BP go to the ED.    7. Obstructive sleep apnea syndrome  Assessment & Plan:  Cpap nightly   8. Cigarette nicotine dependence in remission  -     CT lung screening program; Future; Expected date: 06/07/2024  9. Screening for lung cancer  -     CT lung screening program; Future; Expected date: 06/07/2024  10. Anxiety  Assessment & Plan:  Well controlled on Zoloft. Released by . Denies SI/HI.       History of Present Illness   {Disappearing Hyperlinks I Encounters * My Last Note * Since Last Visit * History :26599}  Pt here today for a f/u with concerns for tingling in left palm and decreased  strength. She reports noting a popping sensation when opening and closing palm for months, which resolved and now she has a tingling/numbness sensation in her left palm. She denies injury to the area. She does have a hx of rheumatoid arthritis.   She is following with endocrinology today for diabetes f/u.         Review of Systems   Constitutional: Negative.  Negative for chills and fever.   HENT: Negative.  Negative for ear pain and sore throat.    Eyes: Negative.  Negative for pain and visual disturbance.   Respiratory: Negative.  Negative for cough and shortness of breath.    Cardiovascular: Negative.  Negative for chest pain and palpitations.   Gastrointestinal: Negative.  Negative for abdominal pain and vomiting.   Endocrine: Negative.    Genitourinary: Negative.  Negative for dysuria and hematuria.   Musculoskeletal:  Negative for arthralgias and back pain.        Left palm numbness/tingling.    Skin: Negative.  Negative for color change and rash.   Allergic/Immunologic: Negative.    Neurological: Negative.  Negative for seizures and syncope.   Hematological: Negative.    Psychiatric/Behavioral: Negative.     All other systems reviewed and are negative.  Diabetic Foot Exam    Patient's  "shoes and socks removed.    Right Foot/Ankle   Right Foot Inspection  Skin Exam: skin normal and skin intact. No dry skin, no warmth, no callus, no erythema, no maceration, no abnormal color, no pre-ulcer, no ulcer and no callus.     Toe Exam: ROM and strength within normal limits.     Sensory   Vibration: intact  Proprioception: intact  Monofilament testing: intact    Vascular  Capillary refills: < 3 seconds  The right DP pulse is 2+. The right PT pulse is 2+.     Left Foot/Ankle  Left Foot Inspection  Skin Exam: skin normal and skin intact. No dry skin, no warmth, no erythema, no maceration, normal color, no pre-ulcer, no ulcer and no callus.     Toe Exam: ROM and strength within normal limits.     Sensory   Vibration: intact  Proprioception: intact  Monofilament testing: intact    Vascular  Capillary refills: < 3 seconds  The left DP pulse is 2+. The left PT pulse is 2+.     Assign Risk Category  No deformity present  No loss of protective sensation  No weak pulses  Risk: 0    Medical History Reviewed by provider this encounter:  Tobacco  Allergies  Meds  Problems  Med Hx  Surg Hx  Fam Hx       Objective   {Disappearing Hyperlinks   Review Vitals * Enter New Vitals * Results Review * Labs * Imaging * Cardiology * Procedures * Lung Cancer Screening :86924}  /82 (BP Location: Right arm, Patient Position: Sitting, Cuff Size: Standard)   Pulse 92   Ht 5' 3\" (1.6 m)   Wt 105 kg (232 lb)   SpO2 99%   BMI 41.10 kg/m²     Physical Exam  Vitals and nursing note reviewed.   Constitutional:       General: She is not in acute distress.     Appearance: She is well-developed.   HENT:      Head: Normocephalic and atraumatic.   Eyes:      Conjunctiva/sclera: Conjunctivae normal.   Cardiovascular:      Rate and Rhythm: Normal rate and regular rhythm.      Pulses: no weak pulses.           Dorsalis pedis pulses are 2+ on the right side and 2+ on the left side.        Posterior tibial pulses are 2+ on the right " side and 2+ on the left side.      Heart sounds: No murmur heard.  Pulmonary:      Effort: Pulmonary effort is normal. No respiratory distress.      Breath sounds: Normal breath sounds.   Abdominal:      Palpations: Abdomen is soft.      Tenderness: There is no abdominal tenderness.   Musculoskeletal:         General: No swelling.      Cervical back: Neck supple.   Feet:      Right foot:      Skin integrity: No ulcer, skin breakdown, erythema, warmth, callus or dry skin.      Left foot:      Skin integrity: No ulcer, skin breakdown, erythema, warmth, callus or dry skin.   Skin:     General: Skin is warm and dry.      Capillary Refill: Capillary refill takes less than 2 seconds.   Neurological:      Mental Status: She is alert.   Psychiatric:         Mood and Affect: Mood normal.       Administrative Statements {Disappearing Hyperlinks I  Level of Service * [facility]}  I have spent a total time of 23 minutes on 06/07/24 In caring for this patient including Risks and benefits of tx options, Impressions, Documenting in the medical record, Reviewing / ordering tests, medicine, procedures  , and Obtaining or reviewing history  .

## 2024-06-07 NOTE — ASSESSMENT & PLAN NOTE
Pt is following endocrinology. Microalbumin/creat level checked in office today. Foot exam WNL.   Lab Results   Component Value Date    HGBA1C 6.0 06/07/2024

## 2024-06-07 NOTE — PATIENT INSTRUCTIONS
Diabetes and Exercise   WHAT YOU NEED TO KNOW:   Physical activity, such as exercise, can help keep your blood sugar level steady or improve insulin resistance. Activity can help decrease your risk for heart disease, and help you lose weight, if needed. Exercise can also help lower your A1c or keep it at goal. Your diabetes care team provider will help you create an exercise plan. The plan will be based on the type of diabetes you have and your starting fitness level.  DISCHARGE INSTRUCTIONS:   Call your local emergency number (911 in the ) if:   You have chest pain or shortness of breath.      Return to the emergency department if:   You have a low blood sugar level and it does not improve with treatment. Symptoms are trouble thinking, a pounding heartbeat, and sweating.    Your blood sugar level is above 240 mg/dL and does not come down within 15 minutes of treatment.    You have blurred or double vision.    Your breath has a fruity, sweet smell, or your breathing is shallow.    Call your doctor or diabetes care team if:   You have ketones in your blood or urine.    You have a fever.    Your blood sugar levels are higher than your target goals.    You often have low blood sugar levels.    Your skin is red, dry, warm, or swollen.    You have a wound that does not heal.    You have trouble coping with diabetes, or you feel anxious or depressed.    You have questions or concerns about your condition or care.    Tips to help you create and meet your exercise goals:   Set a goal for at least 150 minutes (2.5 hours) of moderate to vigorous aerobic activity each week.  Aerobic activity helps your heart stay strong. Aerobic activity includes walking, bicycling, dancing, swimming, and raking leaves. Spread aerobic activity over 3 to 5 days. Do not take more than 2 days off in a row. It is best to do at least 10 minutes at a time and 30 minutes each day. You can work up to these goals. Remember that any activity is better  than no activity. Over time, you can make exercise more intense or last longer. You can also add more days of exercise as your fitness level improves. Your diabetes care team can help you make a step-by-step plan to achieve your goals.         Set a strength training goal of 2 to 3 times a week.  Take at least 1 day off in between strength training sessions. Strength training helps you keep the muscles you have and build new muscles. Strength training includes lifting weights, climbing stairs, yoga, and jose chi.         Older adults should include balance training 2 to 3 times each week.  These include walking backwards, standing on one foot, and walking heel to toe in a straight line.             Other healthy activity tips:   Stretch before and after you exercise to prevent injury.         Drink water or liquids that do not contain sugar before, during, and after exercise. Ask your dietitian or healthcare provider which liquids you should drink when you exercise.    Do not  sit for longer than 30 minutes at a time during your day. If you cannot walk around, at least stand up. This will help you stay active and keep your blood circulating. Try to be active throughout your day.       Exercise and blood sugar levels:  Check your blood sugar level before and after exercise, if you use insulin. Healthcare providers may tell you to change the amount of insulin you take or food you eat.  If your blood sugar level is high, check your blood or urine for ketones before you exercise. Do not exercise if your blood sugar level is high and you have ketones.     If your blood sugar level is less than 100 mg/dL, have a carbohydrate snack before you exercise. Examples are 4 to 6 crackers, ½ banana, 8 ounces (1 cup) of milk, or 4 ounces (½ cup) of juice.       Follow up with your doctor or diabetes care team provider as directed:  Your doctor or provider may recommend counseling to help you meet your exercise goals. Write down your  questions so you remember to ask them during your visits.  © Copyright [employer] 2023 Information is for End User's use only and may not be sold, redistributed or otherwise used for commercial purposes.  The above information is an  only. It is not intended as medical advice for individual conditions or treatments. Talk to your doctor, nurse or pharmacist before following any medical regimen to see if it is safe and effective for you.

## 2024-06-08 LAB
ALBUMIN/CREAT UR: 5 MG/G CREAT
CREAT UR-MCNC: 142 MG/DL (ref 20–275)
MICROALBUMIN UR-MCNC: 0.7 MG/DL

## 2024-06-13 NOTE — TELEPHONE ENCOUNTER
06/13/24 1:54 PM     Chart reviewed for Diabetic Eye Exam was/were submitted to the patient's insurance.     Shanique Valentin   [facility]

## 2024-06-14 ENCOUNTER — OFFICE VISIT (OUTPATIENT)
Dept: OBGYN CLINIC | Facility: CLINIC | Age: 59
End: 2024-06-14
Payer: COMMERCIAL

## 2024-06-14 VITALS
SYSTOLIC BLOOD PRESSURE: 130 MMHG | TEMPERATURE: 97.9 F | HEIGHT: 63 IN | OXYGEN SATURATION: 97 % | HEART RATE: 75 BPM | DIASTOLIC BLOOD PRESSURE: 80 MMHG | WEIGHT: 235 LBS | BODY MASS INDEX: 41.64 KG/M2

## 2024-06-14 DIAGNOSIS — G56.02 CARPAL TUNNEL SYNDROME ON LEFT: Primary | ICD-10-CM

## 2024-06-14 DIAGNOSIS — R20.2 LEFT HAND PARESTHESIA: ICD-10-CM

## 2024-06-14 PROCEDURE — 99213 OFFICE O/P EST LOW 20 MIN: CPT | Performed by: STUDENT IN AN ORGANIZED HEALTH CARE EDUCATION/TRAINING PROGRAM

## 2024-06-14 NOTE — PROGRESS NOTES
"1. Carpal tunnel syndrome on left        2. Left hand paresthesia  Ambulatory Referral to Orthopedic Surgery        No orders of the defined types were placed in this encounter.       Imaging Studies (I personally reviewed images in PACS and report):    No recent relevant imaging     IMPRESSION:  Chronic left hand numbness secondary to carpal tunnel syndrome (+carpal tunnel compression testing, +tinel's)     Other factors:  H/o right sided CTR  Hypothyroidism  BMI 41    PLAN:    Clinical exam and radiographic imaging reviewed with patient today, with impression as per above. I have discussed with the patient the pathophysiology of this diagnosis and reviewed how the examination correlates with this diagnosis.    Imaging obtained/reviewed as per above. I will follow up official radiology interpretation.  Treatment options were discussed at length, including risks and benefits; after discussing these treatment options, the patient elected to defer carpal tunnel cortisone injection today in favor of conservative treatments.  I did consider prescribing her a wrist brace to wear at night but patient states she already owns this brace.  Patient states she already has the exercises and stretches for carpal tunnel syndrome given she has a history of on her right side.  She prefers to try the conservative treatments for now and see if it alleviates progressively will consider other options including surgery if it does not progressively improve which is a reasonable option.    No follow-ups on file.    Portions of the record may have been created with voice recognition software. Occasional wrong word or \"sound a like\" substitutions may have occurred due to the inherent limitations of voice recognition software. Read the chart carefully and recognize, using context, where substitutions have occurred.     CHIEF COMPLAINT:  Chief Complaint   Patient presents with    Left Hand - Pain         HPI:  Kamilah Renteria is a 58 y.o. female  " who presents in regards to referral from KEIRA Marc, for       Visit 2024 :  Initial evaluation of left hand pain/numbness:  Of note, h/o right sided CTR  PMHx BMI 41, post-operative hypothyroidism (d/t thyroidcancer), h/o PMR and follows rheum  Ongoing issue for months  Atraumatic  N/T along palmar aspect of hand.  Aggravated with gripping and lifting.  Denies any loss of strength of her hand or inadvertently dropping objects though.  She does not recall numbness extending into her fingers.  Denies any hand swelling or discoloration.  Patient states there was a catching or locking sensation of her hand previously but this is no longer present.        Medical, Surgical, Family, and Social History    Past Medical History:   Diagnosis Date    Allergic     Anxiety     Cancer (HCC) 2016    Disease of thyroid gland 2016    High cholesterol     Hypertension     Hypothyroid     Primary iridocyclitis of left eye 2023    Thyroid cancer (HCC)      Past Surgical History:   Procedure Laterality Date    BACK SURGERY      BREAST EXCISIONAL BIOPSY Bilateral     benign - over 35 years    CARPAL TUNNEL RELEASE Right     ENDOMETRIAL ABLATION      HYSTERECTOMY  's    LAMINECTOMY      TOTAL VAGINAL HYSTERECTOMY       Social History   Social History     Substance and Sexual Activity   Alcohol Use Yes    Alcohol/week: 4.0 standard drinks of alcohol    Types: 4 Shots of liquor per week     Social History     Substance and Sexual Activity   Drug Use Not Currently    Types: Marijuana    Comment: Had Medical Card for Anxiety     Social History     Tobacco Use   Smoking Status Former    Current packs/day: 0.00    Average packs/day: 1 pack/day for 35.0 years (35.0 ttl pk-yrs)    Types: Cigarettes    Start date: 1980    Quit date: 2011    Years since quittin.2   Smokeless Tobacco Never   Tobacco Comments    QUIT 2010     Family History   Problem Relation Age of Onset    No Known Problems Mother      "No Known Problems Father     Cancer Sister         Liver Cancer    Liver cancer Sister     Diabetes Sister         Type 2    No Known Problems Maternal Grandmother     No Known Problems Maternal Grandfather     No Known Problems Paternal Grandmother     No Known Problems Paternal Grandfather     No Known Problems Maternal Aunt     No Known Problems Maternal Aunt     No Known Problems Maternal Aunt     No Known Problems Maternal Aunt     No Known Problems Maternal Aunt     Cancer Brother         Leukemia     Allergies   Allergen Reactions    Penicillin G Rash    Penicillins Anaphylaxis    Sulfamethoxazole-Trimethoprim Diarrhea and GI Intolerance    Tetracycline Swelling          Physical Exam  /80   Pulse 75   Temp 97.9 °F (36.6 °C) (Temporal)   Ht 5' 3\" (1.6 m)   Wt 107 kg (235 lb)   SpO2 97%   BMI 41.63 kg/m²     Constitutional:  see vital signs  Gen: well-developed, normocephalic/atraumatic, well-groomed  Eyes: No inflammation or discharge of conjunctiva or lids; sclera clear   Pulmonary/Chest: Effort normal. No respiratory distress.     Left Hand Exam     Tenderness   The patient is experiencing no tenderness.     Range of Motion   Wrist   Extension:  45   Flexion:  70   Pronation:  90   Supination:  90     Muscle Strength   Wrist extension: 5/5   Wrist flexion: 5/5   :  5/5     Tests   Phalen’s sign: positive  Tinel's sign (median nerve): positive  Finkelstein's test: negative    Other   Erythema: absent    Comments:  Full digit MCP/PIP/DIP motion without malrotation or digital scissoring  Thumb MCP/DIP intact with opposition  No swelling, bruising, erythema  Sensation intact in radial/median/ulnar distribution    Carpal tunnel compression test: Positive    Tinel's test over the left elbow: Negative              Procedures        "

## 2024-06-23 DIAGNOSIS — I10 HYPERTENSION, UNSPECIFIED TYPE: ICD-10-CM

## 2024-06-24 RX ORDER — HYDROCHLOROTHIAZIDE 25 MG/1
12.5 TABLET ORAL DAILY
Qty: 30 TABLET | Refills: 0 | Status: SHIPPED | OUTPATIENT
Start: 2024-06-24

## 2024-07-04 DIAGNOSIS — F41.9 ANXIETY: ICD-10-CM

## 2024-07-05 RX ORDER — SERTRALINE HYDROCHLORIDE 100 MG/1
200 TABLET, FILM COATED ORAL DAILY
Qty: 180 TABLET | Refills: 0 | Status: SHIPPED | OUTPATIENT
Start: 2024-07-05

## 2024-07-08 DIAGNOSIS — I10 HYPERTENSION, UNSPECIFIED TYPE: ICD-10-CM

## 2024-07-08 RX ORDER — HYDROCHLOROTHIAZIDE 25 MG/1
12.5 TABLET ORAL DAILY
Qty: 90 TABLET | Refills: 0 | Status: SHIPPED | OUTPATIENT
Start: 2024-07-08

## 2024-07-19 ENCOUNTER — HOSPITAL ENCOUNTER (OUTPATIENT)
Dept: CT IMAGING | Facility: HOSPITAL | Age: 59
End: 2024-07-19
Payer: COMMERCIAL

## 2024-07-19 ENCOUNTER — HOSPITAL ENCOUNTER (OUTPATIENT)
Dept: RADIOLOGY | Facility: CLINIC | Age: 59
End: 2024-07-19
Payer: COMMERCIAL

## 2024-07-19 VITALS — HEIGHT: 63 IN | BODY MASS INDEX: 40.75 KG/M2 | WEIGHT: 230 LBS

## 2024-07-19 DIAGNOSIS — Z12.2 SCREENING FOR LUNG CANCER: ICD-10-CM

## 2024-07-19 DIAGNOSIS — F17.211 CIGARETTE NICOTINE DEPENDENCE IN REMISSION: ICD-10-CM

## 2024-07-19 DIAGNOSIS — Z12.31 ENCOUNTER FOR SCREENING MAMMOGRAM FOR BREAST CANCER: ICD-10-CM

## 2024-07-19 PROCEDURE — 77067 SCR MAMMO BI INCL CAD: CPT

## 2024-07-19 PROCEDURE — 77063 BREAST TOMOSYNTHESIS BI: CPT

## 2024-07-19 PROCEDURE — 71271 CT THORAX LUNG CANCER SCR C-: CPT

## 2024-07-23 DIAGNOSIS — R91.8 MULTIPLE LUNG NODULES ON CT: Primary | ICD-10-CM

## 2024-07-23 DIAGNOSIS — J43.9 MILD EMPHYSEMA (HCC): ICD-10-CM

## 2024-08-01 ENCOUNTER — OFFICE VISIT (OUTPATIENT)
Dept: URGENT CARE | Facility: CLINIC | Age: 59
End: 2024-08-01
Payer: COMMERCIAL

## 2024-08-01 VITALS
BODY MASS INDEX: 41.36 KG/M2 | TEMPERATURE: 96.5 F | WEIGHT: 233.4 LBS | SYSTOLIC BLOOD PRESSURE: 139 MMHG | HEIGHT: 63 IN | OXYGEN SATURATION: 95 % | DIASTOLIC BLOOD PRESSURE: 70 MMHG | RESPIRATION RATE: 18 BRPM | HEART RATE: 101 BPM

## 2024-08-01 DIAGNOSIS — M76.62 ACHILLES TENDINITIS OF LEFT LOWER EXTREMITY: Primary | ICD-10-CM

## 2024-08-01 DIAGNOSIS — E11.9 TYPE 2 DIABETES MELLITUS WITHOUT COMPLICATION, WITHOUT LONG-TERM CURRENT USE OF INSULIN (HCC): ICD-10-CM

## 2024-08-01 PROCEDURE — S9083 URGENT CARE CENTER GLOBAL: HCPCS | Performed by: PHYSICIAN ASSISTANT

## 2024-08-01 PROCEDURE — G0382 LEV 3 HOSP TYPE B ED VISIT: HCPCS | Performed by: PHYSICIAN ASSISTANT

## 2024-08-01 RX ORDER — SEMAGLUTIDE 0.68 MG/ML
INJECTION, SOLUTION SUBCUTANEOUS
Qty: 3 ML | Refills: 2 | Status: SHIPPED | OUTPATIENT
Start: 2024-08-01

## 2024-08-01 RX ORDER — METHOTREXATE 25 MG/ML
INJECTION, SOLUTION INTRA-ARTERIAL; INTRAMUSCULAR; INTRAVENOUS
COMMUNITY
Start: 2024-07-31

## 2024-08-01 RX ORDER — PREDNISONE 20 MG/1
40 TABLET ORAL DAILY
Qty: 10 TABLET | Refills: 0 | Status: SHIPPED | OUTPATIENT
Start: 2024-08-01 | End: 2024-08-06

## 2024-08-01 NOTE — PROGRESS NOTES
[facility]        NAME: Kamilah Renteria is a 58 y.o. female  : 1965    MRN: [id number]  DATE: 2024  TIME: 2:00 PM    Assessment and Plan   Achilles tendinitis of left lower extremity [M76.62]  1. Achilles tendinitis of left lower extremity  predniSONE 20 mg tablet    Ambulatory Referral to Physical Therapy    Ambulatory Referral to Orthopedic Surgery            Patient Instructions   Ice.  Stretch.  Prednisone.  Tylenol.  Physical therapy.  Orthopedics.    Follow up with PCP in 3-5 days.  Proceed to  ER if symptoms worsen.    If tests have been performed at [facility], our office will contact you with results if changes need to be made to the care plan discussed with you at the visit.  [facility]    Chief Complaint     Chief Complaint   Patient presents with    Foot Pain     Left heel pain starting 1 week ago. Worsening today. Denies any injuries. Pain increase to 10/10 with ambulation, 0/10 when sitting.          History of Present Illness       Patient is a 58-year-old female with significant past medical history of hypertension, diabetes, thyroid cancer, and polymyalgia rheumatica presents the office complaining of left heel pain for 1 week.  Pain is located to the posterior heel which is most noticeable first in the morning and then in the evening and rated 10 out of 10.  Symptoms are worse with ambulation and physical activity.  No pain with sitting.  States she was camping and was more physically active than usual.  Denies injury or trauma.  She tried over-the-counter remedies with little to no relief.        Review of Systems   Review of Systems   Musculoskeletal:  Positive for arthralgias. Negative for joint swelling.   Skin:  Negative for wound.   Neurological:  Negative for numbness.         Current Medications       Current Outpatient Medications:     atorvastatin (LIPITOR) 80 mg tablet, TAKE 1 TABLET DAILY, Disp: 90 tablet, Rfl: 3     Calcium Carb-Cholecalciferol (Calcium/Vitamin D) 600-400 MG-UNIT TABS, Take 1 tablet by mouth 2 (two) times a day, Disp: , Rfl:     diphenhydrAMINE (BENADRYL) 25 mg tablet, , Disp: , Rfl:     Farxiga 10 MG tablet, TAKE 1 TABLET DAILY, Disp: 90 tablet, Rfl: 1    fexofenadine (ALLEGRA) 180 MG tablet, Take 180 mg by mouth daily PRN, Disp: , Rfl:     fluticasone (FLONASE) 50 mcg/act nasal spray, , Disp: , Rfl:     folic acid (FOLVITE) 1 mg tablet, Take 1,000 mcg by mouth daily, Disp: , Rfl:     hydroCHLOROthiazide 25 mg tablet, Take 0.5 tablets (12.5 mg total) by mouth daily, Disp: 90 tablet, Rfl: 0    hydrOXYzine HCL (ATARAX) 10 mg tablet, Take 1 tablet (10 mg total) by mouth every 6 (six) hours as needed for anxiety, Disp: 30 tablet, Rfl: 1    levothyroxine 175 mcg tablet, Take one tablet daily on empty stomach 1 hour before breakfast and 4 hours apart from calcium and vitamin D supplementation, Disp: 90 tablet, Rfl: 3    lisinopril (ZESTRIL) 40 mg tablet, TAKE 1 TABLET DAILY, Disp: 90 tablet, Rfl: 3    Magnesium 400 MG CAPS, Take 1 each by mouth daily, Disp: , Rfl:     methotrexate 50 MG/2ML injection, , Disp: , Rfl:     Misc Natural Products (FIBER SUPREME PO), Take 1 Scoop by mouth daily, Disp: , Rfl:     omeprazole (PriLOSEC) 20 mg delayed release capsule, TAKE 1 CAPSULE DAILY, Disp: 90 capsule, Rfl: 3    predniSONE 20 mg tablet, Take 2 tablets (40 mg total) by mouth daily for 5 days, Disp: 10 tablet, Rfl: 0    Probiotic Product (CULTURELLE PROBIOTICS PO), Take 1 each by mouth daily, Disp: , Rfl:     semaglutide, 0.25 or 0.5 mg/dose, (Ozempic, 0.25 or 0.5 MG/DOSE,) 2 mg/3 mL injection pen, INJECT 0.25 MG WEEKLY FOR 4-WEEKS, THEN 0.5 MG WEEKLY THEREAFTER, Disp: 3 mL, Rfl: 2    sertraline (ZOLOFT) 100 mg tablet, TAKE 2 TABLETS DAILY, Disp: 180 tablet, Rfl: 0    Bacillus Coagulans-Inulin (Probiotic) 1-250 BILLION-MG CAPS, Start: 06/15/23 10:34:00 EDT, PO, 1 Unknown, 1 Refill(s), Take 1 each by mouth daily (Patient  not taking: Reported on 8/1/2024), Disp: , Rfl:     Calcium Citrate-Vitamin D 250-5 MG-MCG TABS, Start: 06/15/23 10:31:00 EDT, PO, 1 Unknown, 1 Refill(s), Take 1 tablet by mouth 2 (two) times a day (Patient not taking: Reported on 8/1/2024), Disp: , Rfl:     methotrexate 2.5 MG tablet, Take 8 tablets by mouth once a week (Patient not taking: Reported on 8/1/2024), Disp: , Rfl:     metroNIDAZOLE (METROCREAM) 0.75 % cream, , Disp: , Rfl:     Current Allergies     Allergies as of 08/01/2024 - Reviewed 08/01/2024   Allergen Reaction Noted    Penicillin g Rash 07/10/2023    Penicillins Anaphylaxis 12/13/2012    Sulfamethoxazole-trimethoprim Diarrhea and GI Intolerance 12/13/2012    Tetracycline Swelling 07/10/2023            The following portions of the patient's history were reviewed and updated as appropriate: allergies, current medications, past family history, past medical history, past social history, past surgical history and problem list.     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    Thyroid cancer (HCC) 2016       Past Surgical History:   Procedure Laterality Date    BACK SURGERY      BREAST EXCISIONAL BIOPSY Bilateral     benign - over 35 years    CARPAL TUNNEL RELEASE Right     ENDOMETRIAL ABLATION      HYSTERECTOMY  2000's    LAMINECTOMY      THYROIDECTOMY      TOTAL VAGINAL HYSTERECTOMY         Family History   Problem Relation Age of Onset    No Known Problems Mother     No Known Problems Father     Cancer Sister         Liver Cancer    Liver cancer Sister     Diabetes Sister         Type 2    No Known Problems Maternal Grandmother     No Known Problems Maternal Grandfather     No Known Problems Paternal Grandmother     No Known  "Problems Paternal Grandfather     No Known Problems Maternal Aunt     No Known Problems Maternal Aunt     No Known Problems Maternal Aunt     No Known Problems Maternal Aunt     No Known Problems Maternal Aunt     Cancer Brother         Leukemia         Medications have been verified.        Objective   /70   Pulse 101   Temp (!) 96.5 °F (35.8 °C)   Resp 18   Ht 5' 3\" (1.6 m)   Wt 106 kg (233 lb 6.4 oz)   SpO2 95%   BMI 41.34 kg/m²   No LMP recorded. Patient has had a hysterectomy.       Physical Exam     Physical Exam  Vitals and nursing note reviewed.   Constitutional:       Appearance: She is well-developed.   HENT:      Head: Normocephalic and atraumatic.      Right Ear: External ear normal.      Left Ear: External ear normal.      Nose: Nose normal.   Eyes:      General: Lids are normal.      Conjunctiva/sclera: Conjunctivae normal.   Musculoskeletal:      Left ankle: No swelling, deformity or ecchymosis. No tenderness. No lateral malleolus, medial malleolus, ATF ligament or AITF ligament tenderness. Normal range of motion. Anterior drawer test negative. Normal pulse.      Left Achilles Tendon: Tenderness (Distal aspect point tenderness) present.   Skin:     General: Skin is warm and dry.      Capillary Refill: Capillary refill takes less than 2 seconds.      Findings: No rash.   Neurological:      Mental Status: She is alert.                   "

## 2024-08-19 ENCOUNTER — TELEPHONE (OUTPATIENT)
Age: 59
End: 2024-08-19

## 2024-08-19 NOTE — TELEPHONE ENCOUNTER
----- Message from KEIRA Evangelista sent at 8/17/2024 11:22 AM EDT -----  Regarding: CT results faxed to Respiratory specalist  Can you please fax pt's most recent CT results to respiratory specialist. Thank you!

## 2024-09-01 DIAGNOSIS — I10 HYPERTENSION, UNSPECIFIED TYPE: ICD-10-CM

## 2024-09-02 RX ORDER — HYDROCHLOROTHIAZIDE 25 MG/1
12.5 TABLET ORAL DAILY
Qty: 30 TABLET | Refills: 3 | Status: SHIPPED | OUTPATIENT
Start: 2024-09-02

## 2024-09-13 DIAGNOSIS — I10 ESSENTIAL HYPERTENSION: ICD-10-CM

## 2024-09-13 RX ORDER — LISINOPRIL 40 MG/1
40 TABLET ORAL DAILY
Qty: 90 TABLET | Refills: 3 | Status: SHIPPED | OUTPATIENT
Start: 2024-09-13

## 2024-09-15 DIAGNOSIS — I10 HYPERTENSION, UNSPECIFIED TYPE: ICD-10-CM

## 2024-09-16 RX ORDER — HYDROCHLOROTHIAZIDE 25 MG/1
12.5 TABLET ORAL DAILY
Qty: 45 TABLET | Refills: 3 | Status: SHIPPED | OUTPATIENT
Start: 2024-09-16

## 2024-09-23 ENCOUNTER — TELEPHONE (OUTPATIENT)
Dept: ENDOCRINOLOGY | Facility: CLINIC | Age: 59
End: 2024-09-23

## 2024-10-03 DIAGNOSIS — F41.9 ANXIETY: ICD-10-CM

## 2024-10-03 RX ORDER — SERTRALINE HYDROCHLORIDE 100 MG/1
200 TABLET, FILM COATED ORAL DAILY
Qty: 180 TABLET | Refills: 3 | Status: SHIPPED | OUTPATIENT
Start: 2024-10-03

## 2024-10-06 LAB
HBA1C MFR BLD: 6 % OF TOTAL HGB
T4 FREE SERPL-MCNC: 1.3 NG/DL (ref 0.8–1.8)
TSH SERPL-ACNC: 0.69 MIU/L (ref 0.4–4.5)

## 2024-10-07 ENCOUNTER — TELEPHONE (OUTPATIENT)
Age: 59
End: 2024-10-07

## 2024-10-07 NOTE — TELEPHONE ENCOUNTER
Request prior auth for Ozempic -  Pt prescription coverage changed.   She now needs auth.   She is calling it in now. She has one pen left.       Pt gave prior auth fax request #.   Fax-[facility] [place] [phone]

## 2024-10-14 ENCOUNTER — OFFICE VISIT (OUTPATIENT)
Dept: FAMILY MEDICINE CLINIC | Facility: CLINIC | Age: 59
End: 2024-10-14

## 2024-10-14 ENCOUNTER — OFFICE VISIT (OUTPATIENT)
Dept: ENDOCRINOLOGY | Facility: CLINIC | Age: 59
End: 2024-10-14
Payer: COMMERCIAL

## 2024-10-14 VITALS
BODY MASS INDEX: 40.85 KG/M2 | HEIGHT: 62 IN | DIASTOLIC BLOOD PRESSURE: 60 MMHG | OXYGEN SATURATION: 96 % | SYSTOLIC BLOOD PRESSURE: 106 MMHG | WEIGHT: 222 LBS | HEART RATE: 111 BPM | TEMPERATURE: 98 F

## 2024-10-14 VITALS
HEIGHT: 62 IN | DIASTOLIC BLOOD PRESSURE: 68 MMHG | BODY MASS INDEX: 40.85 KG/M2 | HEART RATE: 94 BPM | OXYGEN SATURATION: 98 % | WEIGHT: 222 LBS | SYSTOLIC BLOOD PRESSURE: 116 MMHG

## 2024-10-14 DIAGNOSIS — E78.2 MIXED HYPERLIPIDEMIA: ICD-10-CM

## 2024-10-14 DIAGNOSIS — C73 THYROID CANCER (HCC): ICD-10-CM

## 2024-10-14 DIAGNOSIS — G47.33 OBSTRUCTIVE SLEEP APNEA SYNDROME: ICD-10-CM

## 2024-10-14 DIAGNOSIS — E89.0 POSTOPERATIVE HYPOTHYROIDISM: ICD-10-CM

## 2024-10-14 DIAGNOSIS — E66.813 CLASS 3 OBESITY DUE TO DISRUPTION OF MC4R PATHWAY WITH SERIOUS COMORBIDITY AND BODY MASS INDEX (BMI) OF 40.0 TO 44.9 IN ADULT (HCC): ICD-10-CM

## 2024-10-14 DIAGNOSIS — K21.9 GASTROESOPHAGEAL REFLUX DISEASE WITHOUT ESOPHAGITIS: ICD-10-CM

## 2024-10-14 DIAGNOSIS — I10 PRIMARY HYPERTENSION: Primary | ICD-10-CM

## 2024-10-14 DIAGNOSIS — E11.9 TYPE 2 DIABETES MELLITUS WITHOUT COMPLICATION, WITHOUT LONG-TERM CURRENT USE OF INSULIN (HCC): ICD-10-CM

## 2024-10-14 DIAGNOSIS — M35.3 POLYMYALGIA RHEUMATICA (HCC): ICD-10-CM

## 2024-10-14 DIAGNOSIS — I10 PRIMARY HYPERTENSION: ICD-10-CM

## 2024-10-14 DIAGNOSIS — E78.00 HYPERCHOLESTEROLEMIA: ICD-10-CM

## 2024-10-14 DIAGNOSIS — E88.82 CLASS 3 OBESITY DUE TO DISRUPTION OF MC4R PATHWAY WITH SERIOUS COMORBIDITY AND BODY MASS INDEX (BMI) OF 40.0 TO 44.9 IN ADULT (HCC): ICD-10-CM

## 2024-10-14 DIAGNOSIS — Z15.2 CLASS 3 OBESITY DUE TO DISRUPTION OF MC4R PATHWAY WITH SERIOUS COMORBIDITY AND BODY MASS INDEX (BMI) OF 40.0 TO 44.9 IN ADULT (HCC): ICD-10-CM

## 2024-10-14 DIAGNOSIS — E11.9 TYPE 2 DIABETES MELLITUS WITHOUT COMPLICATION, WITHOUT LONG-TERM CURRENT USE OF INSULIN (HCC): Primary | ICD-10-CM

## 2024-10-14 LAB — SL AMB POCT HEMOGLOBIN AIC: 5.7 (ref ?–6.5)

## 2024-10-14 PROCEDURE — 83036 HEMOGLOBIN GLYCOSYLATED A1C: CPT | Performed by: NURSE PRACTITIONER

## 2024-10-14 PROCEDURE — 99214 OFFICE O/P EST MOD 30 MIN: CPT | Performed by: STUDENT IN AN ORGANIZED HEALTH CARE EDUCATION/TRAINING PROGRAM

## 2024-10-14 PROCEDURE — 99214 OFFICE O/P EST MOD 30 MIN: CPT | Performed by: NURSE PRACTITIONER

## 2024-10-14 RX ORDER — TIRZEPATIDE 5 MG/.5ML
5 INJECTION, SOLUTION SUBCUTANEOUS WEEKLY
Qty: 2 ML | Refills: 1 | Status: SHIPPED | OUTPATIENT
Start: 2024-10-14 | End: 2024-10-18 | Stop reason: SDUPTHER

## 2024-10-14 RX ORDER — ATORVASTATIN CALCIUM 80 MG/1
80 TABLET, FILM COATED ORAL DAILY
Qty: 90 TABLET | Refills: 3 | Status: SHIPPED | OUTPATIENT
Start: 2024-10-14

## 2024-10-14 NOTE — ASSESSMENT & PLAN NOTE
Good control. She is on sglt2i and glp. Ozempic is not covered under formularly. Needs A1c documentation >6.5%, which is unavailable. Patient will review prior records from [facility]. She can benefit from GLP due to cardiometabolic health. Will look to Rx zepbound through [facility] weight management. May continue zepbound 5 mg weekly following termination of ozempic.   Lab Results   Component Value Date    HGBA1C 5.7 10/14/2024       Orders:    Hemoglobin A1C; Future    Basic metabolic panel; Future    Basic metabolic panel

## 2024-10-14 NOTE — PROGRESS NOTES
Ambulatory Visit  Name: Kamilah Renteria      : 1965      MRN: [id number]  Encounter Provider: Kaushal Valencia DO  Encounter Date: 10/14/2024   Encounter department: [facility]    Assessment & Plan  Type 2 diabetes mellitus without complication, without long-term current use of insulin (HCC)  Good control. She is on sglt2i and glp. Ozempic is not covered under formularly. Needs A1c documentation >6.5%, which is unavailable. Patient will review prior records from [facility]. She can benefit from GLP due to cardiometabolic health. Will look to Rx zepbound through indication weight management. May continue zepbound 5 mg weekly following termination of ozempic.   Lab Results   Component Value Date    HGBA1C 5.7 10/14/2024       Orders:    Hemoglobin A1C; Future    Basic metabolic panel; Future    Basic metabolic panel    Thyroid cancer (HCC)  Low risk PTC s/p thyroidectomy, no SILVA. Will plan maintenance of biochemical euthyroidism. Follow up Tg with labs in 6-mo. BRITTNEY on last LNM US , no need at present for routine follow up US monitoring.   Orders:    TSH, 3rd generation; Future    T4, free; Future    THYROID CANCER (THYROGLOBULIN) MONITOR; Future    TSH, 3rd generation    T4, free    THYROID CANCER (THYROGLOBULIN) MONITOR    Postoperative hypothyroidism  She is taking synthroid 175 mcg daily. 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 She has no hx pancreatitis or fam  "hx MTC. She could benefit from pharmacologic assisted weight loss. She has weight related comorbidities including T2D, HLD, osteoarthritis. Pro/cons of GLP reviewed. Start zepbound 5 mg weekly, stop ozempic  Orders:    tirzepatide (Zepbound) 5 mg/0.5 mL auto-injector; Inject 0.5 mL (5 mg total) under the skin once a week for 28 days    RTC 6-mo    History of Present Illness     Kamilah Renteria is a 59 y.o. female who presents in follow up of T2D and post-operative hypothyroidism. For DM, she iss taking farxiga 10 mg daily and ozempic 0.5 mg weekly. Ozempic is tolerated and patient has lost ~10lb. Her insurance will not cover ozempic due to lack of documentation of A1c >6.5%, which patient states is due to treatment. She may have older records from [facility] available.     For post-op hypothyroidism, she takes synthroid 175 mcg daily. She reports good compliance. No thyroidal complaints. No neck Sx.     History obtained from : patient  Review of Systems   Constitutional:  Negative for appetite change, diaphoresis and unexpected weight change.   Gastrointestinal:  Negative for nausea and vomiting.   Endocrine: Negative for polydipsia and polyuria.   All other systems reviewed and are negative.          Objective     /60 (BP Location: Left arm, Patient Position: Sitting)   Pulse (!) 111   Temp 98 °F (36.7 °C)   Ht 5' 2\" (1.575 m)   Wt 101 kg (222 lb)   SpO2 96%   BMI 40.60 kg/m²     Physical Exam  Vitals reviewed.   Constitutional:       General: She is not in acute distress.     Appearance: Normal appearance.   HENT:      Head: Normocephalic and atraumatic.   Eyes:      General: No scleral icterus.     Conjunctiva/sclera: Conjunctivae normal.   Pulmonary:      Effort: Pulmonary effort is normal. No respiratory distress.   Musculoskeletal:         General: No deformity.      Cervical back: Normal range of motion.   Neurological:      General: No focal deficit present.      Mental Status: She is alert. "   Psychiatric:         Mood and Affect: Mood normal.         Behavior: Behavior normal.           Component      Latest Ref Rng 10/5/2024   FREE T4      0.8 - 1.8 ng/dL 1.3    TSH, POC      0.40 - 4.50 mIU/L 0.69    Hemoglobin A1C      <5.7 % of total Hgb 6.0 (H)       Legend:  (H) High

## 2024-10-14 NOTE — ASSESSMENT & PLAN NOTE
Goal to consume 500 to 1,000 fewer calories per day for a 1-2 lbs weight loss per week. Increase exercise to 30 min, 5 times a week as tolerated for a goal of 150 mins a week. Calorie tracking apps such as myfitness pal can be helpful for keeping track of calorie intake. Decrease simple carbohydrates (white bread, pasta, white rice), and increasing vegetables, fruit, and protein.  Increase water.    Orders:    Comprehensive metabolic panel    CBC and differential    Lipid Panel with Direct LDL reflex

## 2024-10-14 NOTE — PROGRESS NOTES
Ambulatory Visit  Name: Kamilah Renteria      : 1965      MRN: [id number]  Encounter Provider: KEIRA Marc  Encounter Date: 10/14/2024   Encounter department: [facility]    Assessment & Plan  Primary hypertension  Well controlled  Discussed low salt diet, increasing exercise to 30 mins 3-4x a week. Check home BP and record for next visit. BP goal <140/90. Discussed ER precautions for chest pain, stroke precautions, or BP of 180/120 or greater (hypertensive crisis), change in LOC or worsening symptoms. Call with new or worsening symptoms.   If you have numbness, tingling, weakness, or severe headache with elevated BP go to the ED.    Orders:    Comprehensive metabolic panel    CBC and differential    Lipid Panel with Direct LDL reflex    Gastroesophageal reflux disease without esophagitis  Controlled at this time. Avoid alcohol and caffeine beverages. Avoid eating for at least 2 hours prior to bedtime, tight clothes, greasy/fatty/spicy foods, mints, smoking. Weight loss can help with GERD symptoms.    IOrders:    Comprehensive metabolic panel    CBC and differential    Lipid Panel with Direct LDL reflex    Type 2 diabetes mellitus without complication, without long-term current use of insulin ([facility])  Pt following with [facility] endocrinology. She recently started Ozempic and has had a 10lb weight loss. In office POC A1C shows A1c 5.7. Continue to follow with endocrinology. GFR ordered today.   Lab Results   Component Value Date    HGBA1C 5.7 10/14/2024       Orders:    Comprehensive metabolic panel    CBC and differential    Lipid Panel with Direct LDL reflex    POCT hemoglobin A1c    BMI 40.0-44.9, adult ([facility])  Goal to consume 500 to 1,000 fewer calories per day for a 1-2 lbs weight loss per week. Increase exercise to 30 min, 5 times a week as tolerated for a goal of 150 mins a week. Calorie tracking apps such as [employer] pal  can be helpful for keeping track of calorie intake. Decrease simple carbohydrates (white bread, pasta, white rice), and increasing vegetables, fruit, and protein.  Increase water.    Orders:    Comprehensive metabolic panel    CBC and differential    Lipid Panel with Direct LDL reflex    Polymyalgia rheumatica (HCC)  Pt reports she completed her methotrexate treatments but continues to follow [facility].        Obstructive sleep apnea syndrome  Pt reports she wears Cpap nightly.        Mixed hyperlipidemia  Discussed adopting a low fat, low cholesterol diet. Adding Omega-3 supplements. Discussed setting exercise goal of 3-4 times a week for 30 mins a day. Will recheck lipid panel.        Thyroid cancer (HCC)  Continue to f/u with endocrinology.           History of Present Illness     Hypertension  Patient is here for follow-up of elevated blood pressure. She is exercising and is adherent to a low-salt diet. Blood pressure is well controlled at home. Cardiac symptoms: none. Patient denies chest pain, dyspnea, fatigue, near-syncope, orthopnea, paroxysmal nocturnal dyspnea, syncope, and tachypnea. Cardiovascular risk factors: diabetes mellitus, dyslipidemia, hypertension, and obesity (BMI >= 30 kg/m2). Use of agents associated with hypertension: none. History of target organ damage: none.          History obtained from : patient  Review of Systems   Constitutional: Negative.  Negative for chills and fever.   HENT: Negative.  Negative for ear pain and sore throat.    Eyes:  Negative for pain and visual disturbance.   Respiratory: Negative.  Negative for cough and shortness of breath.    Cardiovascular:  Negative for chest pain and palpitations.   Gastrointestinal:  Negative for abdominal pain and vomiting.   Genitourinary: Negative.  Negative for dysuria and hematuria.   Musculoskeletal: Negative.  Negative for arthralgias and back pain.   Skin:  Negative for color change and rash.   Neurological: Negative.   "Negative for seizures and syncope.   Psychiatric/Behavioral: Negative.     All other systems reviewed and are negative.          Objective     /68 (BP Location: Left arm, Patient Position: Sitting, Cuff Size: Large)   Pulse 94   Ht 5' 2\" (1.575 m)   Wt 101 kg (222 lb)   SpO2 98%   BMI 40.60 kg/m²     Physical Exam  Vitals and nursing note reviewed.   Constitutional:       General: She is not in acute distress.     Appearance: She is well-developed.   HENT:      Head: Normocephalic and atraumatic.   Eyes:      Conjunctiva/sclera: Conjunctivae normal.   Cardiovascular:      Rate and Rhythm: Normal rate and regular rhythm.      Heart sounds: No murmur heard.  Pulmonary:      Effort: Pulmonary effort is normal. No respiratory distress.      Breath sounds: Normal breath sounds.   Abdominal:      Palpations: Abdomen is soft.      Tenderness: There is no abdominal tenderness.   Musculoskeletal:         General: No swelling.      Cervical back: Neck supple.   Skin:     General: Skin is warm and dry.      Capillary Refill: Capillary refill takes less than 2 seconds.   Neurological:      Mental Status: She is alert.   Psychiatric:         Mood and Affect: Mood normal.       Administrative Statements   I have spent a total time of 36 minutes in caring for this patient on the day of the visit/encounter including Risks and benefits of tx options, Instructions for management, Patient and family education, Risk factor reductions, Counseling / Coordination of care, Documenting in the medical record, and Reviewing / ordering tests, medicine, procedures  .  "

## 2024-10-14 NOTE — ASSESSMENT & PLAN NOTE
Low risk PTC s/p thyroidectomy, no SILVA. Will plan maintenance of biochemical euthyroidism. Follow up Tg with labs in 6-mo. BRITTNEY on last LNM US 2023, no need at present for routine follow up US monitoring.   Orders:    TSH, 3rd generation; Future    T4, free; Future    THYROID CANCER (THYROGLOBULIN) MONITOR; Future    TSH, 3rd generation    T4, free    THYROID CANCER (THYROGLOBULIN) MONITOR

## 2024-10-14 NOTE — ASSESSMENT & PLAN NOTE
Pt following with [facility] endocrinology. She recently started Ozempic and has had a 10lb weight loss. In office POC A1C shows A1c 5.7. Continue to follow with endocrinology. GFR ordered today.   Lab Results   Component Value Date    HGBA1C 5.7 10/14/2024       Orders:    Comprehensive metabolic panel    CBC and differential    Lipid Panel with Direct LDL reflex    POCT hemoglobin A1c

## 2024-10-14 NOTE — PATIENT INSTRUCTIONS
"Patient Education     Diabetes and diet   The Basics   Written by the doctors and editors at [facility]   Why is diet important if I have diabetes? -- Diet is important because it is part of diabetes treatment. Many people need to change what they eat and how much they eat to help treat their diabetes. It is important for people to treat their diabetes so they:   Keep their blood sugar at goal   Prevent long-term problems, such as heart or kidney problems, that can happen in people with diabetes  Changing your diet can also help treat obesity, high blood pressure, and high cholesterol. These conditions can affect people with diabetes and can lead to future problems, such as heart attacks or strokes.  Who will help me change my diet? -- Your doctor or nurse will work with you to make a food plan to change your diet. They might also recommend that you work with a dietitian. A dietitian is an expert on food and eating.  Do I need to eat at the same times every day? -- When and how often you should eat depends, in part, on the diabetes medicines you take. For example:   People who take about the same amount of insulin at the same time each day (called a \"fixed regimen\") should eat meals at the same times. This is also true for people who take pills that increase insulin levels, such as sulfonylureas. Eating meals at the same time every day helps prevent low blood sugar.   People who adjust the dose and timing of their insulin each day (called a \"flexible regimen\") do not always have to eat meals at the same time. That's because they can time their insulin dose for before they plan to eat, and also adjust the dose for how much they plan to eat.   People who take medicines that don't usually cause low blood sugar, such as metformin, don't have to eat meals at the same time every day.  What do I need to think about when planning what to eat? -- Our bodies break down the food we eat into small pieces called carbohydrates, " "proteins, and fats.  When planning what to eat, people with diabetes need to think about:   Carbohydrates (or \"carbs\") - These are sugars the body uses for energy. They can raise your blood sugar level. Your doctor, nurse, or dietitian will tell you how many carbs you should eat at each meal or snack. Foods that have carbs include:   Bread, pasta, and rice   Vegetables and fruits   Dairy foods   Foods and drinks with added sugar  It is best to get your carbs from fruits, vegetables, whole grains, and low-fat milk. It is best to avoid drinks with added sugar, like soda, juices, and sports drinks.   Protein - Your doctor, nurse, or dietitian will tell you how much protein you should eat each day. It is best to eat lean meats, fish, eggs, beans, peas, soy products, nuts, and seeds. Avoid or limit processed meats like gonzalez, hot dogs, and sausages.   Fats - The type of fat you eat is more important than the amount of fat. \"Saturated\" and \"trans\" fats can increase your risk for heart problems, like a heart attack.   Foods that have saturated fats include meat, butter, cheese, and ice cream.   Foods that have trans fats include processed food with \"partially hydrogenated oils\" on the ingredient list. This might include fried foods, store-bought cookies, muffins, pies, and cakes.  \"Monounsaturated\" and \"polyunsaturated\" fats are better for you. Foods with these types of fat include fish, avocado, olive oil, and nuts.   Calories - You need to eat a certain amount of calories each day to keep your weight the same. If you have excess body weight and want to lose weight, you need to eat fewer calories each day.   Fiber - Eating foods with a lot of fiber can help manage your blood sugar level. Foods that have a lot of fiber include apples, green beans, peas, beans, lentils, nuts, oatmeal, and whole grains.   Salt - People who have high blood pressure should not eat foods that contain a lot of salt (also called sodium). People " with high blood pressure should also eat healthy foods, such as fruits, vegetables, and low-fat dairy foods.   Alcohol and sugary drinks - Having more than 1 alcoholic drink (for females) or 2 drinks (for males) a day can raise blood sugar levels. Also, sugary drinks like fruit juice or soda can raise blood sugar levels.  How can I lose weight? -- You can:   Exercise - Try to get at least 30 minutes of physical activity a day, most days of the week. Even gentle exercise, like walking, is good for your health. Some people with diabetes need to change their medicine dose before they exercise. They might also need to check their blood sugar levels before and after exercising.   Eat fewer calories - Your doctor, nurse, or dietitian can tell you how many calories you should eat each day to lose weight.  If you are worried about your weight, size, or shape, talk with your doctor, nurse, or dietitian. They can help you make changes to improve your health.  Can I eat the same foods as my family? -- Yes. You do not need to eat special foods if you have diabetes. You and your family can eat the same foods. Changing your diet is mostly about eating healthy foods in healthy amounts.  What are the other parts of diabetes treatment? -- Besides changing your diet, the other parts of diabetes treatment are:   Exercise   Medicines  Some people with diabetes need to learn how to match their diet and exercise with their medicine dose. For example, people who use insulin might need to choose the dose of insulin they give themselves. To choose their dose, they need to think about:   What they plan to eat at the next meal   How much exercise they plan to do   What their blood sugar level is  If the diet and exercise do not match the medicine dose, a person's blood sugar level can get too low or too high. Blood sugar levels that are too low or too high can cause problems.  All topics are updated as new evidence becomes available and our  peer review process is complete.  This topic retrieved from [employer] on: Mar 27, 2024.  Topic 24544 Version 13.0  Release: 32.2.4 - C32.85  © 2024 UpToDate, Inc. and/or its affiliates. All rights reserved.  Consumer Information Use and Disclaimer   Disclaimer: This generalized information is a limited summary of diagnosis, treatment, and/or medication information. It is not meant to be comprehensive and should be used as a tool to help the user understand and/or assess potential diagnostic and treatment options. It does NOT include all information about conditions, treatments, medications, side effects, or risks that may apply to a specific patient. It is not intended to be medical advice or a substitute for the medical advice, diagnosis, or treatment of a health care provider based on the health care provider's examination and assessment of a patient's specific and unique circumstances. Patients must speak with a health care provider for complete information about their health, medical questions, and treatment options, including any risks or benefits regarding use of medications. This information does not endorse any treatments or medications as safe, effective, or approved for treating a specific patient. UpToDate, Inc. and its affiliates disclaim any warranty or liability relating to this information or the use thereof.The use of this information is governed by the Terms of Use, available at https://www.[employer].com/en/know/clinical-effectiveness-terms. 2024© UpToDate, Inc. and its affiliates and/or licensors. All rights reserved.  Copyright   © 2024 UpToDate, Inc. and/or its affiliates. All rights reserved.

## 2024-10-14 NOTE — ASSESSMENT & PLAN NOTE
Well controlled  Discussed low salt diet, increasing exercise to 30 mins 3-4x a week. Check home BP and record for next visit. BP goal <140/90. Discussed ER precautions for chest pain, stroke precautions, or BP of 180/120 or greater (hypertensive crisis), change in LOC or worsening symptoms. Call with new or worsening symptoms.   If you have numbness, tingling, weakness, or severe headache with elevated BP go to the ED.    Orders:    Comprehensive metabolic panel    CBC and differential    Lipid Panel with Direct LDL reflex

## 2024-10-14 NOTE — ASSESSMENT & PLAN NOTE
Discussed adopting a low fat, low cholesterol diet. Adding Omega-3 supplements. Discussed setting exercise goal of 3-4 times a week for 30 mins a day. Will recheck lipid panel.

## 2024-10-14 NOTE — ASSESSMENT & PLAN NOTE
Controlled at this time. Avoid alcohol and caffeine beverages. Avoid eating for at least 2 hours prior to bedtime, tight clothes, greasy/fatty/spicy foods, mints, smoking. Weight loss can help with GERD symptoms.    IOrders:    Comprehensive metabolic panel    CBC and differential    Lipid Panel with Direct LDL reflex

## 2024-10-15 ENCOUNTER — TELEPHONE (OUTPATIENT)
Age: 59
End: 2024-10-15

## 2024-10-15 NOTE — TELEPHONE ENCOUNTER
PA for tirzepatide (Zepbound) 5 mg/0.5 mL auto-injector SUBMITTED     via      [x][employer]-Case ID # [id number]      Office notes sent, clinical questions answered. Awaiting determination    Turnaround time for your insurance to make a decision on your Prior Authorization can take 7-21 business days.

## 2024-10-16 NOTE — TELEPHONE ENCOUNTER
PA for ZEPBOUND 5 MG APPROVED     Date(s) approved October 15, 2024 to October 15, 2025         Patient advised by          [x]MyChart Message  []Phone call   []LMOM  []L/M to call office as no active Communication consent on file  []Unable to leave detailed message as VM not approved on Communication consent       Pharmacy advised by    [x]Fax  []Phone call    Approval letter scanned into Media No APPROVED VIS SURE SCRIPTS WILL SCAN APPROVAL WHEN MADE AVAILABLE

## 2024-10-18 DIAGNOSIS — E66.813 CLASS 3 OBESITY DUE TO DISRUPTION OF MC4R PATHWAY WITH SERIOUS COMORBIDITY AND BODY MASS INDEX (BMI) OF 40.0 TO 44.9 IN ADULT (HCC): ICD-10-CM

## 2024-10-18 DIAGNOSIS — E88.82 CLASS 3 OBESITY DUE TO DISRUPTION OF MC4R PATHWAY WITH SERIOUS COMORBIDITY AND BODY MASS INDEX (BMI) OF 40.0 TO 44.9 IN ADULT (HCC): ICD-10-CM

## 2024-10-18 DIAGNOSIS — Z15.2 CLASS 3 OBESITY DUE TO DISRUPTION OF MC4R PATHWAY WITH SERIOUS COMORBIDITY AND BODY MASS INDEX (BMI) OF 40.0 TO 44.9 IN ADULT (HCC): ICD-10-CM

## 2024-10-18 RX ORDER — TIRZEPATIDE 5 MG/.5ML
5 INJECTION, SOLUTION SUBCUTANEOUS WEEKLY
Qty: 2 ML | Refills: 1 | Status: SHIPPED | OUTPATIENT
Start: 2024-10-18 | End: 2024-11-15

## 2024-11-14 DIAGNOSIS — E66.813 CLASS 3 OBESITY DUE TO DISRUPTION OF MC4R PATHWAY WITH SERIOUS COMORBIDITY AND BODY MASS INDEX (BMI) OF 40.0 TO 44.9 IN ADULT (HCC): Primary | ICD-10-CM

## 2024-11-14 DIAGNOSIS — Z15.2 CLASS 3 OBESITY DUE TO DISRUPTION OF MC4R PATHWAY WITH SERIOUS COMORBIDITY AND BODY MASS INDEX (BMI) OF 40.0 TO 44.9 IN ADULT (HCC): Primary | ICD-10-CM

## 2024-11-14 DIAGNOSIS — E88.82 CLASS 3 OBESITY DUE TO DISRUPTION OF MC4R PATHWAY WITH SERIOUS COMORBIDITY AND BODY MASS INDEX (BMI) OF 40.0 TO 44.9 IN ADULT (HCC): Primary | ICD-10-CM

## 2024-11-14 RX ORDER — TIRZEPATIDE 7.5 MG/.5ML
7.5 INJECTION, SOLUTION SUBCUTANEOUS WEEKLY
Qty: 2 ML | Refills: 1 | Status: SHIPPED | OUTPATIENT
Start: 2024-11-14 | End: 2024-12-12

## 2024-11-15 DIAGNOSIS — E11.9 TYPE 2 DIABETES MELLITUS WITHOUT COMPLICATION, WITHOUT LONG-TERM CURRENT USE OF INSULIN (HCC): ICD-10-CM

## 2024-11-15 DIAGNOSIS — K21.9 GASTROESOPHAGEAL REFLUX DISEASE WITHOUT ESOPHAGITIS: ICD-10-CM

## 2024-11-15 RX ORDER — DAPAGLIFLOZIN 10 MG/1
10 TABLET, FILM COATED ORAL DAILY
Qty: 90 TABLET | Refills: 1 | Status: SHIPPED | OUTPATIENT
Start: 2024-11-15

## 2024-12-07 ENCOUNTER — TELEMEDICINE (OUTPATIENT)
Dept: OTHER | Facility: HOSPITAL | Age: 59
End: 2024-12-07
Payer: COMMERCIAL

## 2024-12-07 DIAGNOSIS — R21 RASH: Primary | ICD-10-CM

## 2024-12-07 PROCEDURE — 99213 OFFICE O/P EST LOW 20 MIN: CPT | Performed by: NURSE PRACTITIONER

## 2024-12-07 RX ORDER — PREDNISONE 10 MG/1
TABLET ORAL
Qty: 26 TABLET | Refills: 0 | Status: SHIPPED | OUTPATIENT
Start: 2024-12-07 | End: 2024-12-14 | Stop reason: ALTCHOICE

## 2024-12-07 NOTE — PROGRESS NOTES
Virtual Regular Visit  Name: Kamilah Renteria      : 1965      MRN: [id number]  Encounter Provider: Your Video Visit Provider  Encounter Date: 2024   Encounter department: VIRTUAL CARE       Verification of patient location:  Patient is located at Home in the following state in which I hold an active license [place] :  Assessment & Plan  Rash    Orders:    predniSONE 10 mg tablet; 3 tablets BID x 2 days,2 tablets BID x 2 days,1 tablet BID x 2 days,1 tablet daily x 2 days.        Encounter provider Your Video Visit Provider    The patient was identified by name and date of birth. Kamilah Renteria was informed that this is a telemedicine visit and that the visit is being conducted through the Epic Embedded platform. She agrees to proceed..  My office door was closed. No one else was in the room.  She acknowledged consent and understanding of privacy and security of the video platform. The patient has agreed to participate and understands they can discontinue the visit at any time.    Patient is aware this is a billable service.     History was obtained from: History obtained from: patient  History of Present Illness     Rash  This is a new problem. The current episode started in the past 7 days. The problem has been gradually worsening since onset. The affected locations include the left arm and torso. The problem is mild. The rash is characterized by itchiness. Associated with: got a new tattoo on left forearm 1 week ago. Associated symptoms include itching. Pertinent negatives include no facial edema, fatigue, fever or joint pain. Past treatments include nothing. The treatment provided no relief. There were no sick contacts.     Review of Systems   Constitutional:  Negative for chills, diaphoresis, fatigue and fever.   HENT: Negative.     Respiratory: Negative.     Cardiovascular: Negative.    Musculoskeletal: Negative.  Negative for joint pain.   Skin:  Positive for itching and rash.       Objective   There were  no vitals taken for this visit.    Physical Exam  Constitutional:       General: She is not in acute distress.     Appearance: She is well-developed. She is not diaphoretic.   HENT:      Mouth/Throat:      Comments: Airway patent. No tongue or lip swelling noted.   Cardiovascular:      Comments: Unable to assess in this setting   Pulmonary:      Effort: Pulmonary effort is normal.      Comments: Patient able to converse in full sentences, easy non-labored respirations noted. No dyspnea observed.      Skin:     Coloration: Skin is not pale.      Findings: Rash (maculopapular to arms and chest. no vesicles noted) present.      Comments: Tattoo to left forearm appears to be healing. No surrounding swelling, drainage or induration noted   Neurological:      Mental Status: She is alert and oriented to person, place, and time.         Visit Time  Total Visit Duration: 15 minutes not including the time spent for establishing the audio/video connection.

## 2024-12-07 NOTE — PATIENT INSTRUCTIONS
Take medication as directed.  Recommend applying over-the-counter hydrocortisone cream to affected areas.  Can take Benadryl as needed for itching, it can make you drowsy. If you develop any increased redness, rash is spreading, facial swelling, shortness of breath, difficulty breathing, fever, any new or concerning symptoms please return or proceed ER.  Advised follow-up with PCP in 3-5 days

## 2024-12-14 ENCOUNTER — TELEMEDICINE (OUTPATIENT)
Dept: OTHER | Facility: HOSPITAL | Age: 59
End: 2024-12-14
Payer: COMMERCIAL

## 2024-12-14 DIAGNOSIS — J01.10 ACUTE NON-RECURRENT FRONTAL SINUSITIS: Primary | ICD-10-CM

## 2024-12-14 PROBLEM — U07.1 COVID: Status: RESOLVED | Noted: 2024-02-08 | Resolved: 2024-12-14

## 2024-12-14 PROBLEM — Z12.2 SCREENING FOR LUNG CANCER: Status: RESOLVED | Noted: 2024-06-07 | Resolved: 2024-12-14

## 2024-12-14 PROCEDURE — 99213 OFFICE O/P EST LOW 20 MIN: CPT | Performed by: PHYSICIAN ASSISTANT

## 2024-12-14 RX ORDER — CLINDAMYCIN HYDROCHLORIDE 300 MG/1
300 CAPSULE ORAL EVERY 8 HOURS SCHEDULED
Qty: 30 CAPSULE | Refills: 0 | Status: SHIPPED | OUTPATIENT
Start: 2024-12-14 | End: 2024-12-24

## 2024-12-14 NOTE — PROGRESS NOTES
Virtual Regular Visit  Name: Kamilah Renteria      : 1965      MRN: [id number]  Encounter Provider: Shannon D Severino, PA-C  Encounter Date: 2024   Encounter department: VIRTUAL CARE       Verification of patient location:  Patient is located at Home in the following state in which [place] an active license [place]  Assessment & Plan  Acute non-recurrent frontal sinusitis    Orders:    clindamycin (CLEOCIN) 300 MG capsule; Take 1 capsule (300 mg total) by mouth every 8 (eight) hours for 10 days        Encounter provider Shannon D Severino, PA-C    The patient was identified by name and date of birth. Kamilah Renteria was informed that this is a telemedicine visit and that the visit is being conducted through the Epic Embedded platform. She agrees to proceed..  My office door was closed. No one else was in the room.  She acknowledged consent and understanding of privacy and security of the video platform. The patient has agreed to participate and understands they can discontinue the visit at any time.    Patient is aware this is a billable service.     History was obtained from: History obtained from: patient  History of Present Illness     Pt cierra has had sinus infection for 2 weeks. Was on prednisone for skin condition which helped with the sinus congestion some.Today sinus pressure is worse in frontal region, green rhinorrhea. Reports able to get out green mucous. Mucinex, nasal lavage, Flonase.      Review of Systems   Constitutional:  Negative for fever.   HENT:  Positive for congestion, postnasal drip, rhinorrhea, sinus pressure and sinus pain. Negative for nosebleeds and sore throat.    Eyes:  Negative for redness.   Respiratory:  Negative for shortness of breath.    Cardiovascular:  Negative for chest pain.   Gastrointestinal:  Negative for blood in stool.   Genitourinary:  Negative for hematuria.   Musculoskeletal:  Negative for gait problem.   Skin:  Negative for rash.   Neurological:  Positive for  headaches. Negative for seizures.   Psychiatric/Behavioral:  Negative for behavioral problems.        Objective   There were no vitals taken for this visit.    Physical Exam  Constitutional:       General: She is not in acute distress.     Appearance: Normal appearance. She is obese. She is not toxic-appearing.   HENT:      Head: Normocephalic and atraumatic.      Nose: No rhinorrhea.      Right Sinus: Frontal sinus tenderness present.      Left Sinus: Frontal sinus tenderness present.      Mouth/Throat:      Mouth: Mucous membranes are moist.   Eyes:      Conjunctiva/sclera: Conjunctivae normal.      Comments: glasses   Pulmonary:      Effort: Pulmonary effort is normal. No respiratory distress.      Breath sounds: No wheezing (no gross audible wheeze through computer).   Musculoskeletal:      Cervical back: Normal range of motion.   Skin:     Findings: No rash (on face or neck).   Neurological:      Mental Status: She is alert.      Cranial Nerves: No dysarthria or facial asymmetry.   Psychiatric:         Mood and Affect: Mood normal.         Behavior: Behavior normal.         Visit Time  Total Visit Duration: 8 minutes not including the time spent for establishing the audio/video connection.

## 2024-12-30 ENCOUNTER — OFFICE VISIT (OUTPATIENT)
Dept: FAMILY MEDICINE CLINIC | Facility: CLINIC | Age: 59
End: 2024-12-30

## 2024-12-30 VITALS
HEART RATE: 90 BPM | HEIGHT: 62 IN | SYSTOLIC BLOOD PRESSURE: 132 MMHG | WEIGHT: 226 LBS | DIASTOLIC BLOOD PRESSURE: 70 MMHG | BODY MASS INDEX: 41.59 KG/M2 | OXYGEN SATURATION: 96 %

## 2024-12-30 DIAGNOSIS — L23.9 ALLERGIC DERMATITIS: Primary | ICD-10-CM

## 2024-12-30 PROCEDURE — 99213 OFFICE O/P EST LOW 20 MIN: CPT | Performed by: NURSE PRACTITIONER

## 2024-12-30 RX ORDER — TRIAMCINOLONE ACETONIDE 1 MG/G
OINTMENT TOPICAL 2 TIMES DAILY
Qty: 15 G | Refills: 0 | Status: SHIPPED | OUTPATIENT
Start: 2024-12-30

## 2024-12-30 NOTE — PROGRESS NOTES
"Name: Kamilah Renteria      : 1965      MRN: [id number]  Encounter Provider: KEIRA Marc  Encounter Date: 2024   [facility]  :  Assessment & Plan  Allergic dermatitis  Kenalog ointment sent to pharmacy. Discussed SE and use. Advised to contact office if signs of infection develop.   Orders:  •  triamcinolone (KENALOG) 0.1 % ointment; Apply topically 2 (two) times a day           History of Present Illness     Pt here today with concerns of a pruritic rash on her left forearm following a tattoo she receive 2 weeks ago. Pt reports she did receive an oral steroid through urgent care which improved her rash and itchy reaction. Pt notes she has some lingering itchiness, and small bumps in the area of the tattoo. She denies signs of infection such as pain, redness, heat, discharge from the area. She has not used any OTC products on the area.       Review of Systems   Constitutional:  Negative for chills and fever.   HENT:  Negative for ear pain and sore throat.    Eyes:  Negative for pain and visual disturbance.   Respiratory:  Negative for cough and shortness of breath.    Cardiovascular:  Negative for chest pain and palpitations.   Gastrointestinal:  Negative for abdominal pain and vomiting.   Genitourinary:  Negative for dysuria and hematuria.   Musculoskeletal:  Negative for arthralgias and back pain.   Skin:  Positive for rash. Negative for color change.   Neurological:  Negative for seizures and syncope.   All other systems reviewed and are negative.      Objective   /70 (BP Location: Left arm, Patient Position: Sitting, Cuff Size: Large)   Pulse 90   Ht 5' 2\" (1.575 m)   Wt 103 kg (226 lb)   SpO2 96%   BMI 41.34 kg/m²      Physical Exam  Constitutional:       General: She is not in acute distress.     Appearance: Normal appearance. She is not ill-appearing, toxic-appearing or " diaphoretic.   HENT:      Head: Normocephalic and atraumatic.   Eyes:      General:         Right eye: No discharge.         Left eye: No discharge.   Pulmonary:      Effort: Pulmonary effort is normal. No respiratory distress.   Skin:     Coloration: Skin is not jaundiced or pale.      Findings: Rash present. No bruising, erythema or lesion. Rash is urticarial.      Comments: Small area of excoriation noted. No redness or signs of infection noted.    Neurological:      Mental Status: She is alert and oriented to person, place, and time.   Psychiatric:         Mood and Affect: Mood normal.         Behavior: Behavior normal.         Thought Content: Thought content normal.         Judgment: Judgment normal.       Administrative Statements   I have spent a total time of 18 minutes in caring for this patient on the day of the visit/encounter including Instructions for management, Patient and family education, Reviewing / ordering tests, medicine, procedures  , and Obtaining or reviewing history  .

## 2024-12-30 NOTE — ASSESSMENT & PLAN NOTE
Kenalog ointment sent to pharmacy. Discussed SE and use. Advised to contact office if signs of infection develop.   Orders:  •  triamcinolone (KENALOG) 0.1 % ointment; Apply topically 2 (two) times a day

## 2024-12-30 NOTE — PATIENT INSTRUCTIONS
Patient Education     Contact dermatitis   The Basics   Written by the doctors and editors at [facility]   What is dermatitis? -- Dermatitis is a type of skin rash that can happen after your skin touches something that irritates it or something you are allergic to.  Things that irritate the skin can be found in products that you use every day, such as soaps or cleansers. Some of the things that can cause skin allergies include:   Certain medicines, perfumes, or cosmetics   The metal in some kinds of jewelry   Plants, such as poison ivy and poison oak  Sometimes, you can develop a rash the first time you touch something. But it is also possible to get a rash from something that you have used before without any problems.  What other symptoms should I watch for? -- If you have a rash, your skin might be dry, itchy, or cracked (picture 1). In people with light skin, the rash is often red. In people with darker skin, it might appear purple, brown, gray, or black (picture 2). If your rash is caused by an allergy, you might also have some swelling or blisters where you have the rash.  Severe symptoms include:   Pain   Widespread swelling   Blisters, oozing, or crusting of the skin  Is there anything I can do on my own? -- Yes. You can:   Avoid using or touching whatever might have caused your rash.   Protect your skin from anything that might irritate it or cause an allergy. For example, wear gloves if you need to work with harsh soaps.   Use cool or warm water, not hot, for baths and showers. You can also try a special kind of bath called an oatmeal bath.   Try using soothing skin products to help with the itching and discomfort. Examples include thick moisturizing cream or petroleum jelly. Put this on your skin right after you get out of the bath or shower and after washing your hands.   Avoid scratching your skin. It might help to:   Wear cotton gloves at night.   Keep your nails short and clean.   Cover the parts of your  skin that itch.  How are skin rashes treated? -- Your doctor might prescribe different treatments or medicines to help your rash heal. These can include:   Steroid creams and ointments - These go on the skin, and they relieve itching and redness.   Steroid pills - You might need to take these for a short time if your rash is severe. But your doctor or nurse will want to take you off of the steroid pills as soon as possible. Even though these medicines help, they can also cause problems of their own.   Wet or damp dressings - These can be helpful for skin that is crusting or oozing. To use a wet or damp dressing, you need to wear 2 layers of clothing. First, put on a layer of damp cotton clothes over your rash. Then, put on a layer of dry clothes on top of the damp ones. People who need these dressings often wear them at night when they sleep.  When should I call the doctor? -- Call your doctor or nurse for advice if:   You have a rash that does not go away within 2 weeks.   Your rash gets worse or spreads over large parts of your body.   You have signs of infection like swelling, warmth, pain, or fever.  All topics are updated as new evidence becomes available and our peer review process is complete.  This topic retrieved from [employer] on: Feb 26, 2024.  Topic 20291 Version 12.0  Release: 32.2.4 - C32.56  © 2024 UpToDate, Inc. and/or its affiliates. All rights reserved.  picture 1: Chronic irritant contact dermatitis     If you have dermatitis, your skin might be red, dry, itchy, or cracked.  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 They have dermatitis where the button of their jeans touched their skin.  Graphic 436112 Version 1.0  Consumer Information Use and Disclaimer   Disclaimer: This generalized information is a limited summary of diagnosis, treatment, and/or medication information. It is not meant to be comprehensive  and should be used as a tool to help the user understand and/or assess potential diagnostic and treatment options. It does NOT include all information about conditions, treatments, medications, side effects, or risks that may apply to a specific patient. It is not intended to be medical advice or a substitute for the medical advice, diagnosis, or treatment of a health care provider based on the health care provider's examination and assessment of a patient's specific and unique circumstances. Patients must speak with a health care provider for complete information about their health, medical questions, and treatment options, including any risks or benefits regarding use of medications. This information does not endorse any treatments or medications as safe, effective, or approved for treating a specific patient. UpToDate, Inc. and its affiliates disclaim any warranty or liability relating to this information or the use thereof.The use of this information is governed by the Terms of Use, available at https://www.[employer].com/en/know/clinical-effectiveness-terms. 2024© UpToDate, Inc. and its affiliates and/or licensors. All rights reserved.  Copyright   © 2024 UpToDate, Inc. and/or its affiliates. All rights reserved.

## 2025-01-21 ENCOUNTER — TELEPHONE (OUTPATIENT)
Age: 60
End: 2025-01-21

## 2025-01-21 DIAGNOSIS — E66.813 CLASS 3 OBESITY DUE TO DISRUPTION OF MC4R PATHWAY WITH SERIOUS COMORBIDITY AND BODY MASS INDEX (BMI) OF 40.0 TO 44.9 IN ADULT (HCC): Primary | ICD-10-CM

## 2025-01-21 DIAGNOSIS — Z15.2 CLASS 3 OBESITY DUE TO DISRUPTION OF MC4R PATHWAY WITH SERIOUS COMORBIDITY AND BODY MASS INDEX (BMI) OF 40.0 TO 44.9 IN ADULT (HCC): Primary | ICD-10-CM

## 2025-01-21 DIAGNOSIS — E88.82 CLASS 3 OBESITY DUE TO DISRUPTION OF MC4R PATHWAY WITH SERIOUS COMORBIDITY AND BODY MASS INDEX (BMI) OF 40.0 TO 44.9 IN ADULT (HCC): Primary | ICD-10-CM

## 2025-01-21 NOTE — TELEPHONE ENCOUNTER
Per [employer] message sent by patient today:    Hi Dr. Valencia:     I just realized that I do not have any more refills of my zepbound 7.5. Can you send an rx to [facility]? I used my last injection yesterday.      No active order in chart for Zepbound 7.5mg, please send in new script with refills to [facility] per patient request. Please advise, thank you.

## 2025-01-22 RX ORDER — TIRZEPATIDE 7.5 MG/.5ML
7.5 INJECTION, SOLUTION SUBCUTANEOUS WEEKLY
Qty: 2 ML | Refills: 3 | Status: SHIPPED | OUTPATIENT
Start: 2025-01-22 | End: 2025-02-19

## 2025-03-13 DIAGNOSIS — E11.9 TYPE 2 DIABETES MELLITUS WITHOUT COMPLICATION, WITHOUT LONG-TERM CURRENT USE OF INSULIN (HCC): ICD-10-CM

## 2025-03-13 RX ORDER — LEVOTHYROXINE SODIUM 175 UG/1
TABLET ORAL
Qty: 90 TABLET | Refills: 1 | Status: SHIPPED | OUTPATIENT
Start: 2025-03-13

## 2025-03-28 ENCOUNTER — TELEMEDICINE (OUTPATIENT)
Dept: OTHER | Facility: HOSPITAL | Age: 60
End: 2025-03-28
Payer: COMMERCIAL

## 2025-03-28 VITALS — TEMPERATURE: 99.8 F

## 2025-03-28 DIAGNOSIS — J32.9 SINUSITIS, UNSPECIFIED CHRONICITY, UNSPECIFIED LOCATION: Primary | ICD-10-CM

## 2025-03-28 PROCEDURE — 99213 OFFICE O/P EST LOW 20 MIN: CPT | Performed by: NURSE PRACTITIONER

## 2025-03-28 RX ORDER — DOXYCYCLINE HYCLATE 100 MG
100 TABLET ORAL 2 TIMES DAILY
Qty: 14 TABLET | Refills: 0 | Status: SHIPPED | OUTPATIENT
Start: 2025-03-28 | End: 2025-04-04

## 2025-03-28 NOTE — PATIENT INSTRUCTIONS
"Rest and drink extra fluids.  Start antibiotic.  Take probiotic.  OTC cough and cold as needed.  Flonase can also be helpful.  Nasal saline flushes or gennaro pot can help flush the sinuses.  Follow up with PCP if no improvement.  Go to ER with any worsening symptoms.     Patient Education     Sinusitis in adults   The Basics   Written by the doctors and editors at [facility]   What is sinusitis? -- Sinusitis is a condition that can cause a stuffy nose, pain in the face, and discharge or \"mucus\" from the nose.  The sinuses are hollow areas in the bones of the face (figure 1). They have a thin lining that normally makes a small amount of mucus. When this lining gets irritated or infected, it swells and makes extra mucus. This causes symptoms.  Sinusitis usually happens after a person gets sick with a cold. The germs causing the cold can infect the sinuses, too. Sometimes, other germs can be the cause of the infection. Often, a person feels like their cold is getting better. But then, they get sinusitis and begin to feel sick again.  What are the symptoms of sinusitis? -- Common symptoms of sinusitis include:   Stuffy or blocked nose   Thick white, yellow, or green discharge from the nose   Pain in the teeth   Pain or pressure in the face - This often feels worse when a person bends forward.  People with sinusitis can also have other symptoms, such as:   Fever   Cough   Trouble smelling   Ear pressure or fullness   Headache   Bad breath   Feeling tired  Most of the time, symptoms start to improve in 7 to 10 days.  Should I see a doctor or nurse? -- See your doctor or nurse if your symptoms last more than 10 days, or if your symptoms first get better but then get worse.  Rarely, sinusitis can lead to serious problems. See your doctor or nurse right away (do not wait 10 days) if you have:   Fever higher than 102°F (38.9°C)   Sudden and severe pain in the face and head   Trouble seeing, or seeing double   Trouble thinking " clearly   Swelling or redness around 1 or both eyes   Stiff neck  Is there anything I can do on my own to feel better? -- Yes. To help with your symptoms, you can:   Take an over-the-counter pain reliever to reduce the pain.   Rinse your nose and sinuses with salt water a few times a day - Ask your doctor or nurse about the best way to do this.   Drink plenty of fluids - Staying hydrated might help to thin the mucus and make it drain more easily.  Your doctor might also recommend a steroid nose spray to reduce the swelling in your nose, especially if you have allergies. Talk to your doctor if you are thinking of using a steroid spray.  How is sinusitis treated? -- Most of the time, sinusitis does not need to be treated with antibiotic medicines. This is because most sinusitis is caused by viruses, not bacteria, and antibiotics do not kill viruses. In fact, even sinusitis caused by bacteria will usually get better on its own without antibiotics.  Some people with sinusitis do need treatment with antibiotics. If your symptoms have not improved after 10 days, ask your doctor if you should take antibiotics. They might recommend that you wait 1 more week to see if your symptoms improve. But if you have symptoms such as a fever or a lot of pain, they might prescribe antibiotics. If you do get antibiotics, follow all of your doctor's instructions about taking them.  What if my symptoms do not get better? -- If your symptoms do not get better, talk with your doctor or nurse. They might order tests to figure out why you still have symptoms. These can include:   CT scan or other imaging tests - Imaging tests create pictures of the inside of the body.   A test to look inside the sinuses - For this test, a doctor puts a thin tube with a camera on the end into the nose and up into the sinuses.  Some people get a lot of sinus infections or have symptoms that last at least 3 months. These people can have a different type of  "sinusitis called \"chronic sinusitis.\" Chronic sinusitis can be caused by different things. For example, some people have growths inside their nose or sinuses that are called \"polyps.\" Other people have allergies that cause their symptoms.  Chronic sinusitis can be treated in different ways. If you have chronic sinusitis, talk with your doctor about which treatments are right for you.  All topics are updated as new evidence becomes available and our peer review process is complete.  This topic retrieved from [employer] on: Feb 28, 2024.  Topic 56553 Version 21.0  Release: 32.2.4 - C32.58  © 2024 UpToDate, Inc. and/or its affiliates. All rights reserved.  figure 1: Sinuses of the face     The sinuses are hollow areas in the bones of the face. This drawing shows where the sinuses are, from the side and front views. There are 4 pairs of sinuses, named for the bones around them: sphenoid, frontal, ethmoid, and maxillary.  Graphic 761192 Version 3.0  Consumer Information Use and Disclaimer   Disclaimer: This generalized information is a limited summary of diagnosis, treatment, and/or medication information. It is not meant to be comprehensive and should be used as a tool to help the user understand and/or assess potential diagnostic and treatment options. It does NOT include all information about conditions, treatments, medications, side effects, or risks that may apply to a specific patient. It is not intended to be medical advice or a substitute for the medical advice, diagnosis, or treatment of a health care provider based on the health care provider's examination and assessment of a patient's specific and unique circumstances. Patients must speak with a health care provider for complete information about their health, medical questions, and treatment options, including any risks or benefits regarding use of medications. This information does not endorse any treatments or medications as safe, effective, or approved for " treating a specific patient. UpToDate, Inc. and its affiliates disclaim any warranty or liability relating to this information or the use thereof.The use of this information is governed by the Terms of Use, available at https://www.[employer]uwer.com/en/know/clinical-effectiveness-terms. 2024© UpToDate, Inc. and its affiliates and/or licensors. All rights reserved.  Copyright   © 2024 UpToDate, Inc. and/or its affiliates. All rights reserved.

## 2025-03-28 NOTE — PROGRESS NOTES
Virtual Regular Visit  Name: Kamilah Renteria      : 1965      MRN: [id number]  Encounter Provider: KEIRA Raza  Encounter Date: 3/28/2025   Encounter department: VIRTUAL CARE       Verification of patient location:  Patient is located at Other in the following state in which I hold an active license [place] :  Assessment & Plan  Sinusitis, unspecified chronicity, unspecified location    Orders:    doxycycline hyclate (VIBRA-TABS) 100 mg tablet; Take 1 tablet (100 mg total) by mouth 2 (two) times a day for 7 days        Encounter provider KEIRA Raza    The patient was identified by name and date of birth. Kamilah Renteria was informed that this is a telemedicine visit and that the visit is being conducted through the Epic Embedded platform. She agrees to proceed..  My office door was closed. No one else was in the room.  She acknowledged consent and understanding of privacy and security of the video platform. The patient has agreed to participate and understands they can discontinue the visit at any time.    Patient is aware this is a billable service.       History of Present Illness     This is a 59 year old female here today for video visit.  She states she has been sick since 3/19.  She states she was sinus headache and congestion.  She states she is having post nasal drip.  She states last weekend she felt like flu symptoms and now having low grade fevers.  She states she has sinus headache.  She states this Am woke up with earache.  She has been using mucinex which helps with drainage.  She is now having some pain in left ear.  She has been using gennaro pot, flonase and mucinex.    Patient states she had doxy in the past.       Review of Systems   Constitutional:  Positive for fatigue and fever. Negative for activity change and chills.   HENT:  Positive for congestion, ear pain, sinus pressure and sinus pain.    Respiratory: Negative.     Neurological: Negative.    Psychiatric/Behavioral:  Negative.         Objective   Temp 99.8 °F (37.7 °C)     Physical Exam  Constitutional:       General: She is not in acute distress.     Appearance: Normal appearance. She is not ill-appearing.   HENT:      Head: Normocephalic and atraumatic.   Pulmonary:      Effort: Pulmonary effort is normal. No respiratory distress.   Neurological:      Mental Status: She is alert.   Psychiatric:         Mood and Affect: Mood normal.         Behavior: Behavior normal.         Thought Content: Thought content normal.         Judgment: Judgment normal.         Visit Time  Total Visit Duration: 5 minutes not including the time spent for establishing the audio/video connection.

## 2025-03-31 ENCOUNTER — RA CDI HCC (OUTPATIENT)
Dept: OTHER | Facility: HOSPITAL | Age: 60
End: 2025-03-31

## 2025-04-02 ENCOUNTER — RESULTS FOLLOW-UP (OUTPATIENT)
Age: 60
End: 2025-04-02

## 2025-04-02 ENCOUNTER — RESULTS FOLLOW-UP (OUTPATIENT)
Dept: ENDOCRINOLOGY | Facility: CLINIC | Age: 60
End: 2025-04-02

## 2025-04-02 ENCOUNTER — OFFICE VISIT (OUTPATIENT)
Age: 60
End: 2025-04-02

## 2025-04-02 VITALS
BODY MASS INDEX: 40.45 KG/M2 | TEMPERATURE: 98.6 F | DIASTOLIC BLOOD PRESSURE: 68 MMHG | HEART RATE: 85 BPM | HEIGHT: 62 IN | SYSTOLIC BLOOD PRESSURE: 128 MMHG | OXYGEN SATURATION: 98 % | WEIGHT: 219.8 LBS

## 2025-04-02 DIAGNOSIS — J01.10 ACUTE NON-RECURRENT FRONTAL SINUSITIS: Primary | ICD-10-CM

## 2025-04-02 DIAGNOSIS — R74.8 ELEVATED LIVER ENZYMES: ICD-10-CM

## 2025-04-02 DIAGNOSIS — C73 THYROID CANCER (HCC): ICD-10-CM

## 2025-04-02 DIAGNOSIS — E11.9 TYPE 2 DIABETES MELLITUS WITHOUT COMPLICATION, WITHOUT LONG-TERM CURRENT USE OF INSULIN (HCC): ICD-10-CM

## 2025-04-02 DIAGNOSIS — M35.3 POLYMYALGIA RHEUMATICA (HCC): ICD-10-CM

## 2025-04-02 PROBLEM — E78.00 HYPERCHOLESTEREMIA: Status: ACTIVE | Noted: 2023-04-10

## 2025-04-02 LAB
ALBUMIN SERPL-MCNC: 4.3 G/DL (ref 3.6–5.1)
ALBUMIN/GLOB SERPL: 1.6 (CALC) (ref 1–2.5)
ALP SERPL-CCNC: 87 U/L (ref 37–153)
ALT SERPL-CCNC: 40 U/L (ref 6–29)
AST SERPL-CCNC: 62 U/L (ref 10–35)
BASOPHILS # BLD AUTO: 32 CELLS/UL (ref 0–200)
BASOPHILS NFR BLD AUTO: 0.7 %
BILIRUB SERPL-MCNC: 0.9 MG/DL (ref 0.2–1.2)
BUN SERPL-MCNC: 13 MG/DL (ref 7–25)
BUN/CREAT SERPL: ABNORMAL (CALC) (ref 6–22)
CALCIUM SERPL-MCNC: 9.3 MG/DL (ref 8.6–10.4)
CHLORIDE SERPL-SCNC: 99 MMOL/L (ref 98–110)
CHOLEST SERPL-MCNC: 150 MG/DL
CHOLEST/HDLC SERPL: 5.8 (CALC)
CO2 SERPL-SCNC: 28 MMOL/L (ref 20–32)
CREAT SERPL-MCNC: 0.61 MG/DL (ref 0.5–1.03)
EOSINOPHIL # BLD AUTO: 81 CELLS/UL (ref 15–500)
EOSINOPHIL NFR BLD AUTO: 1.8 %
ERYTHROCYTE [DISTWIDTH] IN BLOOD BY AUTOMATED COUNT: 13.7 % (ref 11–15)
GFR/BSA.PRED SERPLBLD CYS-BASED-ARV: 103 ML/MIN/1.73M2
GLOBULIN SER CALC-MCNC: 2.7 G/DL (CALC) (ref 1.9–3.7)
GLUCOSE SERPL-MCNC: 113 MG/DL (ref 65–99)
HCT VFR BLD AUTO: 36.9 % (ref 35–45)
HDLC SERPL-MCNC: 26 MG/DL
HGB BLD-MCNC: 12.1 G/DL (ref 11.7–15.5)
LDLC SERPL CALC-MCNC: 76 MG/DL (CALC)
LYMPHOCYTES # BLD AUTO: 1877 CELLS/UL (ref 850–3900)
LYMPHOCYTES NFR BLD AUTO: 41.7 %
MCH RBC QN AUTO: 32.9 PG (ref 27–33)
MCHC RBC AUTO-ENTMCNC: 32.8 G/DL (ref 32–36)
MCV RBC AUTO: 100.3 FL (ref 80–100)
MONOCYTES # BLD AUTO: 270 CELLS/UL (ref 200–950)
MONOCYTES NFR BLD AUTO: 6 %
NEUTROPHILS # BLD AUTO: 2241 CELLS/UL (ref 1500–7800)
NEUTROPHILS NFR BLD AUTO: 49.8 %
NONHDLC SERPL-MCNC: 124 MG/DL (CALC)
PLATELET # BLD AUTO: 195 THOUSAND/UL (ref 140–400)
PMV BLD REES-ECKER: 11.1 FL (ref 7.5–12.5)
POTASSIUM SERPL-SCNC: 4.4 MMOL/L (ref 3.5–5.3)
PROT SERPL-MCNC: 7 G/DL (ref 6.1–8.1)
RBC # BLD AUTO: 3.68 MILLION/UL (ref 3.8–5.1)
SERVICE CMNT-IMP: ABNORMAL
SODIUM SERPL-SCNC: 136 MMOL/L (ref 135–146)
TRIGL SERPL-MCNC: 398 MG/DL
WBC # BLD AUTO: 4.5 THOUSAND/UL (ref 3.8–10.8)

## 2025-04-02 PROCEDURE — 99214 OFFICE O/P EST MOD 30 MIN: CPT | Performed by: NURSE PRACTITIONER

## 2025-04-02 PROCEDURE — MEDROL 4MG MEDROL 4MG: Performed by: NURSE PRACTITIONER

## 2025-04-02 RX ORDER — TIRZEPATIDE 7.5 MG/.5ML
INJECTION, SOLUTION SUBCUTANEOUS
COMMUNITY
Start: 2025-03-21

## 2025-04-02 RX ORDER — METHYLPREDNISOLONE 4 MG/1
TABLET ORAL
Start: 2025-04-02

## 2025-04-02 NOTE — PROGRESS NOTES
Name: Kamilah Renteria      : 1965      MRN: [id number]  Encounter Provider: KEIRA Marc  Encounter Date: 2025   [facility]  :  Assessment & Plan  Acute non-recurrent frontal sinusitis  Pt on day 5 of 7 doxy. We discussed sx not improving likely indicates a viral infection. Advised on sx treatment. Increase fluids, rest, continue flonase and mucinex. Discussed SE and use of medrol pack. Advised steroids may increase glucose levels so monitor as appropriate.   Orders:  •  methylPREDNISolone 4 MG tablet therapy pack; Use as directed on package    Elevated liver enzymes  Will repeat LFT. Advised Tylenol, alcohol, and recent viral illness may increase levels.  Orders:  •  Hepatic function panel; Future    Thyroid cancer (HCC)  Continue with Endo as scheduled.        Polymyalgia rheumatica (HCC)  Continue with rheumatology as scheduled.        Type 2 diabetes mellitus without complication, without long-term current use of insulin (HCC)  Continue with endo as scheduled.   Lab Results   Component Value Date    HGBA1C 5.7 10/14/2024                BMI Counseling: Body mass index is 40.2 kg/m². The BMI is above normal. Nutrition recommendations include decreasing portion sizes and encouraging healthy choices of fruits and vegetables. Exercise recommendations include moderate physical activity 150 minutes/week. Rationale for BMI follow-up plan is due to patient being overweight or obese.       History of Present Illness   Pt here today with concerns of sinus pressure, HA, PND, and fatigue since 2025. Pt had a virtual apt on 3/28 and was prescribed Doxycycline. Pt states sx have persisted. She is taking Mucinex, Flonase, and allegra, as well as doing sinus saline rinses. Pt is on day 5 of 7 of Doxy. She denies sore throat, cough, N/V/D, fever, chills, body aches.     Sinus Problem  This is a new problem. The  "current episode started in the past 7 days. The problem is unchanged. There has been no fever. Associated symptoms include congestion, headaches and sinus pressure. Pertinent negatives include no chills, coughing, diaphoresis, ear pain, hoarse voice, neck pain, shortness of breath, sneezing, sore throat or swollen glands.     Review of Systems   Constitutional: Negative.  Negative for chills, diaphoresis and fever.   HENT:  Positive for congestion, postnasal drip, sinus pressure and sinus pain. Negative for ear discharge, ear pain, hoarse voice, rhinorrhea, sneezing, sore throat, tinnitus and trouble swallowing.    Respiratory: Negative.  Negative for cough and shortness of breath.    Cardiovascular: Negative.  Negative for chest pain.   Gastrointestinal: Negative.  Negative for abdominal distention, abdominal pain, diarrhea and nausea.   Musculoskeletal: Negative.  Negative for neck pain.   Skin: Negative.    Neurological:  Positive for headaches. Negative for dizziness, tremors, seizures, syncope, facial asymmetry, speech difficulty and weakness.       Objective   /68 (BP Location: Left arm, Patient Position: Sitting, Cuff Size: Large)   Pulse 85   Temp 98.6 °F (37 °C)   Ht 5' 2\" (1.575 m)   Wt 99.7 kg (219 lb 12.8 oz)   SpO2 98%   BMI 40.20 kg/m²      Physical Exam  Constitutional:       General: She is not in acute distress.     Appearance: Normal appearance. She is not ill-appearing, toxic-appearing or diaphoretic.   HENT:      Head: Normocephalic and atraumatic.      Right Ear: Tympanic membrane, ear canal and external ear normal.      Left Ear: Tympanic membrane, ear canal and external ear normal.      Nose: No congestion or rhinorrhea.      Right Sinus: Frontal sinus tenderness present. No maxillary sinus tenderness.      Left Sinus: Frontal sinus tenderness present. No maxillary sinus tenderness.   Eyes:      General:         Right eye: No discharge.         Left eye: No discharge. "   Cardiovascular:      Rate and Rhythm: Normal rate and regular rhythm.      Heart sounds: Normal heart sounds.   Pulmonary:      Effort: Pulmonary effort is normal. No respiratory distress.      Breath sounds: No stridor. No wheezing, rhonchi or rales.   Chest:      Chest wall: No tenderness.   Abdominal:      General: Bowel sounds are normal.      Palpations: Abdomen is soft.   Skin:     Coloration: Skin is not jaundiced or pale.      Findings: No bruising, erythema, lesion or rash.   Neurological:      Mental Status: She is alert and oriented to person, place, and time.   Psychiatric:         Mood and Affect: Mood normal.         Behavior: Behavior normal.         Thought Content: Thought content normal.         Judgment: Judgment normal.

## 2025-04-02 NOTE — LETTER
April 2, 2025     Patient: Kamilah Renteria  YOB: 1965  Date of Visit: 4/2/2025      To Whom it May Concern:    Kamilah Renteria is under my professional care. Kamilah was seen in my office on 4/2/2025. Kamilah may return to work on 04/09/2025 or sooner if symptoms improve .    If you have any questions or concerns, please don't hesitate to call.         Sincerely,          KEIRA Marc        CC: No Recipients

## 2025-04-02 NOTE — PATIENT INSTRUCTIONS
"Patient Education     Sinusitis in adults   The Basics   Written by the doctors and editors at [facility]   What is sinusitis? -- Sinusitis is a condition that can cause a stuffy nose, pain in the face, and discharge or \"mucus\" from the nose.  The sinuses are hollow areas in the bones of the face (figure 1). They have a thin lining that normally makes a small amount of mucus. When this lining gets irritated or infected, it swells and makes extra mucus. This causes symptoms.  Sinusitis usually happens after a person gets sick with a cold. The germs causing the cold can infect the sinuses, too. Sometimes, other germs can be the cause of the infection. Often, a person feels like their cold is getting better. But then, they get sinusitis and begin to feel sick again.  What are the symptoms of sinusitis? -- Common symptoms of sinusitis include:   Stuffy or blocked nose   Thick white, yellow, or green discharge from the nose   Pain in the teeth   Pain or pressure in the face - This often feels worse when a person bends forward.  People with sinusitis can also have other symptoms, such as:   Fever   Cough   Trouble smelling   Ear pressure or fullness   Headache   Bad breath   Feeling tired  Most of the time, symptoms start to improve in 7 to 10 days.  Should I see a doctor or nurse? -- See your doctor or nurse if your symptoms last more than 10 days, or if your symptoms first get better but then get worse.  Rarely, sinusitis can lead to serious problems. See your doctor or nurse right away (do not wait 10 days) if you have:   Fever higher than 102°F (38.9°C)   Sudden and severe pain in the face and head   Trouble seeing, or seeing double   Trouble thinking clearly   Swelling or redness around 1 or both eyes   Stiff neck  Is there anything I can do on my own to feel better? -- Yes. To help with your symptoms, you can:   Take an over-the-counter pain reliever to reduce the pain.   Rinse your nose and sinuses with salt water a " "few times a day - Ask your doctor or nurse about the best way to do this.   Drink plenty of fluids - Staying hydrated might help to thin the mucus and make it drain more easily.  Your doctor might also recommend a steroid nose spray to reduce the swelling in your nose, especially if you have allergies. Talk to your doctor if you are thinking of using a steroid spray.  How is sinusitis treated? -- Most of the time, sinusitis does not need to be treated with antibiotic medicines. This is because most sinusitis is caused by viruses, not bacteria, and antibiotics do not kill viruses. In fact, even sinusitis caused by bacteria will usually get better on its own without antibiotics.  Some people with sinusitis do need treatment with antibiotics. If your symptoms have not improved after 10 days, ask your doctor if you should take antibiotics. They might recommend that you wait 1 more week to see if your symptoms improve. But if you have symptoms such as a fever or a lot of pain, they might prescribe antibiotics. If you do get antibiotics, follow all of your doctor's instructions about taking them.  What if my symptoms do not get better? -- If your symptoms do not get better, talk with your doctor or nurse. They might order tests to figure out why you still have symptoms. These can include:   CT scan or other imaging tests - Imaging tests create pictures of the inside of the body.   A test to look inside the sinuses - For this test, a doctor puts a thin tube with a camera on the end into the nose and up into the sinuses.  Some people get a lot of sinus infections or have symptoms that last at least 3 months. These people can have a different type of sinusitis called \"chronic sinusitis.\" Chronic sinusitis can be caused by different things. For example, some people have growths inside their nose or sinuses that are called \"polyps.\" Other people have allergies that cause their symptoms.  Chronic sinusitis can be treated in " different ways. If you have chronic sinusitis, talk with your doctor about which treatments are right for you.  All topics are updated as new evidence becomes available and our peer review process is complete.  This topic retrieved from [employer] on: Feb 28, 2024.  Topic 30492 Version 21.0  Release: 32.2.4 - C32.58  © 2024 UpToDate, Inc. and/or its affiliates. All rights reserved.  figure 1: Sinuses of the face     The sinuses are hollow areas in the bones of the face. This drawing shows where the sinuses are, from the side and front views. There are 4 pairs of sinuses, named for the bones around them: sphenoid, frontal, ethmoid, and maxillary.  Graphic 437496 Version 3.0  Consumer Information Use and Disclaimer   Disclaimer: This generalized information is a limited summary of diagnosis, treatment, and/or medication information. It is not meant to be comprehensive and should be used as a tool to help the user understand and/or assess potential diagnostic and treatment options. It does NOT include all information about conditions, treatments, medications, side effects, or risks that may apply to a specific patient. It is not intended to be medical advice or a substitute for the medical advice, diagnosis, or treatment of a health care provider based on the health care provider's examination and assessment of a patient's specific and unique circumstances. Patients must speak with a health care provider for complete information about their health, medical questions, and treatment options, including any risks or benefits regarding use of medications. This information does not endorse any treatments or medications as safe, effective, or approved for treating a specific patient. UpToDate, Inc. and its affiliates disclaim any warranty or liability relating to this information or the use thereof.The use of this information is governed by the Terms of Use, available at  https://www.woltersBit9uwer.com/en/know/clinical-effectiveness-terms. 2024© [employer] and its affiliates and/or licensors. All rights reserved.  Copyright   © 2024 [employer] and/or its affiliates. All rights reserved.

## 2025-04-02 NOTE — ASSESSMENT & PLAN NOTE
Continue with endo as scheduled.   Lab Results   Component Value Date    HGBA1C 5.7 10/14/2024

## 2025-04-03 LAB
BUN SERPL-MCNC: 13 MG/DL (ref 7–25)
BUN/CREAT SERPL: ABNORMAL (CALC) (ref 6–22)
CALCIUM SERPL-MCNC: 9.2 MG/DL (ref 8.6–10.4)
CHLORIDE SERPL-SCNC: 99 MMOL/L (ref 98–110)
CO2 SERPL-SCNC: 25 MMOL/L (ref 20–32)
CREAT SERPL-MCNC: 0.67 MG/DL (ref 0.5–1.03)
GFR/BSA.PRED SERPLBLD CYS-BASED-ARV: 101 ML/MIN/1.73M2
GLUCOSE SERPL-MCNC: 110 MG/DL (ref 65–99)
POTASSIUM SERPL-SCNC: 4.5 MMOL/L (ref 3.5–5.3)
SODIUM SERPL-SCNC: 134 MMOL/L (ref 135–146)
T4 FREE SERPL-MCNC: 1.6 NG/DL (ref 0.8–1.8)
THYROGLOB AB SERPL-ACNC: 1 IU/ML
THYROGLOB SERPL-MCNC: <0.1 NG/ML
TSH SERPL-ACNC: 4.36 MIU/L (ref 0.4–4.5)

## 2025-04-17 ENCOUNTER — OFFICE VISIT (OUTPATIENT)
Dept: FAMILY MEDICINE CLINIC | Facility: CLINIC | Age: 60
End: 2025-04-17

## 2025-04-17 ENCOUNTER — OFFICE VISIT (OUTPATIENT)
Dept: ENDOCRINOLOGY | Facility: CLINIC | Age: 60
End: 2025-04-17
Payer: COMMERCIAL

## 2025-04-17 VITALS
SYSTOLIC BLOOD PRESSURE: 124 MMHG | HEART RATE: 74 BPM | BODY MASS INDEX: 40.72 KG/M2 | OXYGEN SATURATION: 98 % | WEIGHT: 221.25 LBS | TEMPERATURE: 97.6 F | DIASTOLIC BLOOD PRESSURE: 80 MMHG | HEIGHT: 62 IN

## 2025-04-17 VITALS
HEART RATE: 98 BPM | TEMPERATURE: 97.6 F | DIASTOLIC BLOOD PRESSURE: 68 MMHG | HEIGHT: 62 IN | SYSTOLIC BLOOD PRESSURE: 130 MMHG | WEIGHT: 219.6 LBS | BODY MASS INDEX: 40.41 KG/M2

## 2025-04-17 DIAGNOSIS — I10 PRIMARY HYPERTENSION: ICD-10-CM

## 2025-04-17 DIAGNOSIS — Z15.2 CLASS 3 OBESITY DUE TO DISRUPTION OF MC4R PATHWAY WITH SERIOUS COMORBIDITY AND BODY MASS INDEX (BMI) OF 40.0 TO 44.9 IN ADULT (HCC): Primary | ICD-10-CM

## 2025-04-17 DIAGNOSIS — E06.3 HYPOTHYROIDISM DUE TO HASHIMOTO THYROIDITIS: ICD-10-CM

## 2025-04-17 DIAGNOSIS — E89.0 POSTOPERATIVE HYPOTHYROIDISM: ICD-10-CM

## 2025-04-17 DIAGNOSIS — E55.9 VITAMIN D DEFICIENCY: ICD-10-CM

## 2025-04-17 DIAGNOSIS — C73 THYROID CANCER (HCC): ICD-10-CM

## 2025-04-17 DIAGNOSIS — J30.9 ALLERGIC RHINITIS, UNSPECIFIED SEASONALITY, UNSPECIFIED TRIGGER: ICD-10-CM

## 2025-04-17 DIAGNOSIS — K21.9 GASTROESOPHAGEAL REFLUX DISEASE WITHOUT ESOPHAGITIS: ICD-10-CM

## 2025-04-17 DIAGNOSIS — E11.9 TYPE 2 DIABETES MELLITUS WITHOUT COMPLICATION, WITHOUT LONG-TERM CURRENT USE OF INSULIN (HCC): ICD-10-CM

## 2025-04-17 DIAGNOSIS — E11.9 TYPE 2 DIABETES MELLITUS WITHOUT COMPLICATION, WITHOUT LONG-TERM CURRENT USE OF INSULIN (HCC): Primary | ICD-10-CM

## 2025-04-17 DIAGNOSIS — E78.2 MIXED HYPERLIPIDEMIA: ICD-10-CM

## 2025-04-17 DIAGNOSIS — E88.82 CLASS 3 OBESITY DUE TO DISRUPTION OF MC4R PATHWAY WITH SERIOUS COMORBIDITY AND BODY MASS INDEX (BMI) OF 40.0 TO 44.9 IN ADULT (HCC): Primary | ICD-10-CM

## 2025-04-17 DIAGNOSIS — E66.813 CLASS 3 OBESITY DUE TO DISRUPTION OF MC4R PATHWAY WITH SERIOUS COMORBIDITY AND BODY MASS INDEX (BMI) OF 40.0 TO 44.9 IN ADULT (HCC): Primary | ICD-10-CM

## 2025-04-17 PROBLEM — Z00.00 ANNUAL PHYSICAL EXAM: Status: ACTIVE | Noted: 2024-06-07

## 2025-04-17 LAB — SL AMB POCT HEMOGLOBIN AIC: 5.2 (ref ?–6.5)

## 2025-04-17 PROCEDURE — 99213 OFFICE O/P EST LOW 20 MIN: CPT | Performed by: PHYSICIAN ASSISTANT

## 2025-04-17 RX ORDER — MONTELUKAST SODIUM 10 MG/1
10 TABLET ORAL
Qty: 30 TABLET | Refills: 5 | Status: SHIPPED | OUTPATIENT
Start: 2025-04-17

## 2025-04-17 RX ORDER — MONTELUKAST SODIUM 10 MG/1
10 TABLET ORAL
Qty: 30 TABLET | Refills: 5 | Status: SHIPPED | OUTPATIENT
Start: 2025-04-17 | End: 2025-04-17 | Stop reason: SDUPTHER

## 2025-04-17 RX ORDER — TIRZEPATIDE 10 MG/.5ML
10 INJECTION, SOLUTION SUBCUTANEOUS WEEKLY
Qty: 2 ML | Refills: 5 | Status: SHIPPED | OUTPATIENT
Start: 2025-04-17 | End: 2025-05-15

## 2025-04-17 NOTE — ASSESSMENT & PLAN NOTE
Add Singulair  Orders:    montelukast (SINGULAIR) 10 mg tablet; Take 1 tablet (10 mg total) by mouth daily at bedtime

## 2025-04-17 NOTE — PROGRESS NOTES
"Name: Kamilah Renteria      : 1965      MRN: [id number]  Encounter Provider: Robert Budinetz, MD  Encounter Date: 2025   Encounter department: [facility]  :  Assessment & Plan  Type 2 diabetes mellitus without complication, without long-term current use of insulin (HCC)  Her A1c is 5.2.  Would consider stopping Farxiga and increasing her Zepbound for improved weight loss.  Lab Results   Component Value Date    HGBA1C 5.7 10/14/2024       Orders:  •  POCT hemoglobin A1c    Allergic rhinitis, unspecified seasonality, unspecified trigger  Add Singulair  Orders:  •  montelukast (SINGULAIR) 10 mg tablet; Take 1 tablet (10 mg total) by mouth daily at bedtime    Vitamin D deficiency  Check labs  Orders:  •  Vitamin D 1,25 dihydroxy; Future    Hypothyroidism due to Hashimoto thyroiditis    Orders:  •  Lipid panel; Future  •  TSH, 3rd generation with Free T4 reflex; Future  Continue Synthroid and check a TSH  Gastroesophageal reflux disease without esophagitis  Continue proton pump inhibitor                History of Present Illness   The patient is here for her initial visit.  She is doing very well.  Her A1c is 5.2.  She is on Farxiga and Zepbound.  Possibly stopping Farxiga and increasing Zepbound will help with more weight loss.  She does follow with an endocrinologist will take his recommendations.  She needs routine blood work before her follow-up in 6 months.  She needs a TSH and a vitamin D level.  She has chronic allergies she has been getting some sinus infections due to them working to add Singulair to her antihistamine and nasal steroids spray.  Heartburn stable on a proton pump inhibitor.    Review of Systems   Respiratory: Negative.     Cardiovascular: Negative.    Gastrointestinal: Negative.        Objective   /80 (BP Location: Left arm, Patient Position: Sitting, Cuff Size: Large)   Pulse 74   Temp 97.6 °F (36.4 °C) (Temporal)   Ht 5' 2\" (1.575 m)   Wt 100 kg (221 lb " 4 oz)   SpO2 98%   BMI 40.47 kg/m²      Physical Exam  Vitals and nursing note reviewed.   Constitutional:       General: She is not in acute distress.     Appearance: She is well-developed.   HENT:      Head: Normocephalic and atraumatic.   Eyes:      Conjunctiva/sclera: Conjunctivae normal.   Cardiovascular:      Rate and Rhythm: Normal rate and regular rhythm.      Heart sounds: No murmur heard.  Pulmonary:      Effort: Pulmonary effort is normal. No respiratory distress.      Breath sounds: Normal breath sounds.   Abdominal:      Palpations: Abdomen is soft.      Tenderness: There is no abdominal tenderness.   Musculoskeletal:         General: No swelling.      Cervical back: Neck supple.   Skin:     General: Skin is warm and dry.      Capillary Refill: Capillary refill takes less than 2 seconds.   Neurological:      Mental Status: She is alert.   Psychiatric:         Mood and Affect: Mood normal.

## 2025-04-17 NOTE — ASSESSMENT & PLAN NOTE
Her A1c is 5.2.  Would consider stopping Farxiga and increasing her Zepbound for improved weight loss.  Lab Results   Component Value Date    HGBA1C 5.7 10/14/2024       Orders:    POCT hemoglobin A1c

## 2025-04-17 NOTE — PROGRESS NOTES
Name: Kamilah Renteria      : 1965      MRN: [id number]  Encounter Provider: Joseph Tarango PA-C  Encounter Date: 2025   Encounter department: [facility]    No chief complaint on file.  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   Reviewed MOA, potential AEs .    Orders:    tirzepatide (Zepbound) 10 mg/0.5 mL auto-injector; Inject 0.5 mL (10 mg total) under the skin once a week for 28 days    Postoperative hypothyroidism  TFTs remain WNL, however TSH high normal range. Recommend f/u TFTs in 2-3 months.     Orders:    TSH, 3rd generation; Future    T4, free; Future    Type 2 diabetes mellitus without complication, without long-term current use of insulin ([facility])    Lab Results   Component Value Date    HGBA1C 5.2 2025   Stable A1c. To continue GLP1. Ok to continue PRN BG monitoring.          Thyroid cancer ([facility])  Low risk PTC s/p thyroidectomy in , no SILVA. Will plan maintenance of biochemical euthyroidism. Follow up Tg 2025 - WNL. May consider annual surveillance Tg, however now > 5 y since diagnosis.  BRITTNEY on last LNM US , no need at present for routine follow up US monitoring.        Primary hypertension  Stable - continue ACE-I       Mixed hyperlipidemia  On statin.           History of Present Illness     Kamilah Renteria is a 59 y.o. female with type 2 DM, thyroid CA s/p thyroidectomy .     Kamilah reports about 2 months of weight loss plateau.   Zepbound intiially lead to some increase in IBS symptoms - combination of diarrhea and constipation. This has resolved with fiber intake. Dose has been gradually up-titrated - She is taking Zepbound 7.5mg weekly dose for about 3 months now.     She continues to be mindful of dietary  "choices - she is attempting to increase protein content, reduce carbohydrate content.   Exercise - walking routinely.    BG monitoring - has glucometer and supplies. But does not routinely check.  No overt hypoglycemia or hyperglycemia symptoms.    DM medications reviewed:   Farxiga 10mg daily     In addition to DM and obesity management we also manage thyroid cancer, postoperative hypothyroidism.   Synthroid 175mcg daily first thing in AM, without food or other medications. No dose changes recently, very compliant with dosing.   She is feeling fatigued but this is in setting of recent illness, likely viral URI. Was treated with steroids.           Review of Systems as per [facility]           Medical History Reviewed by provider this encounter:     .    Objective   /68 (Patient Position: Sitting, Cuff Size: Standard)   Pulse 98   Temp 97.6 °F (36.4 °C) (Temporal)   Ht 5' 2\" (1.575 m)   Wt 99.6 kg (219 lb 9.6 oz)   BMI 40.17 kg/m²      Body mass index is 40.17 kg/m².  Wt Readings from Last 3 Encounters:   04/17/25 99.6 kg (219 lb 9.6 oz)   04/17/25 100 kg (221 lb 4 oz)   04/02/25 99.7 kg (219 lb 12.8 oz)     Physical Exam  Vitals reviewed.   Constitutional:       General: She is not in acute distress.     Appearance: She is not ill-appearing.   HENT:      Head: Normocephalic.   Pulmonary:      Effort: No respiratory distress.     Neurological:      Mental Status: She is alert. Mental status is at baseline.     Psychiatric:         Mood and Affect: Mood normal.         Labs:   Lab Results   Component Value Date    HGBA1C 5.2 04/17/2025    HGBA1C 5.7 10/14/2024    HGBA1C 6.0 (H) 10/05/2024     Lab Results   Component Value Date    CREATININE 0.61 04/01/2025    CREATININE 0.67 04/01/2025    CREATININE 0.59 11/11/2023    BUN 13 04/01/2025    K 4.4 04/01/2025    CL 99 04/01/2025    CO2 28 04/01/2025     EGFR   Date Value Ref Range Status   01/15/2022 86.56  Final     Comment:     Chronic Kidney Disease: eGFR <60 " "mL/min/1.73 sq.m.  Renal Failure: eGFR <15 mL/min/1.73 sq.m.   10/16/2021 100 mg/dL Final     eGFR   Date Value Ref Range Status   04/01/2025 103 > OR = 60 mL/min/1.73m2 Final     EXTERNAL EGFR   Date Value Ref Range Status   09/30/2023 104  Final     Lab Results   Component Value Date    HDL 26 (L) 04/01/2025    TRIG 398 (H) 04/01/2025     Lab Results   Component Value Date    ALT 40 (H) 04/01/2025    AST 62 (H) 04/01/2025    ALKPHOS 87 04/01/2025     No results found for: \"[id number]\"  Lab Results   Component Value Date    FREET4 1.6 04/01/2025       There are no Patient Instructions on file for this visit.    Discussed with the patient and all questioned fully answered. She will call me if any problems arise.    "

## 2025-04-21 ENCOUNTER — TELEPHONE (OUTPATIENT)
Age: 60
End: 2025-04-21

## 2025-04-21 NOTE — TELEPHONE ENCOUNTER
Appears Zepbound was approved for 5mg dose.However, question if new prior auth needed for 10mg dose?   Please let me know if any further action is needed.

## 2025-05-27 NOTE — ASSESSMENT & PLAN NOTE
Low risk PTC s/p thyroidectomy in 2016, no SILVA. Will plan maintenance of biochemical euthyroidism. Follow up Tg 4/2025 - WNL. May consider annual surveillance Tg, however now > 5 y since diagnosis.  BRITTNEY on last LNM US 2023, no need at present for routine follow up US monitoring.

## 2025-05-27 NOTE — ASSESSMENT & PLAN NOTE
Lab Results   Component Value Date    HGBA1C 5.2 04/17/2025   Stable A1c. To continue GLP1. Ok to continue PRN BG monitoring.

## 2025-06-05 ENCOUNTER — TELEPHONE (OUTPATIENT)
Age: 60
End: 2025-06-05

## 2025-06-05 ENCOUNTER — TELEPHONE (OUTPATIENT)
Dept: ENDOCRINOLOGY | Facility: CLINIC | Age: 60
End: 2025-06-05

## 2025-06-05 DIAGNOSIS — G47.33 OBSTRUCTIVE SLEEP APNEA: Primary | ICD-10-CM

## 2025-06-05 NOTE — TELEPHONE ENCOUNTER
Patient pt on zepbound. insurance is  changing 7/1/25 it will no long cover medication for weight loss. pt asking for a prior auth under dm2. pt contacted [employer] to get coverage info for auth. they stated if pt has comorbidity   ex , sleep apnea , high cholestorol . needs them all listed on the request. Patient asking can an auth be place under type 2DM for zepbound now due to losing coverage because prior authorization was under weight loss?

## 2025-06-05 NOTE — TELEPHONE ENCOUNTER
Provider will need to reorder Zepbound if appropriate. No longer active on medication list. Provider will need to change diagnosis on prescription and make request to PA team to submit Prior Authorization.  Thank you.

## 2025-06-05 NOTE — TELEPHONE ENCOUNTER
Called patient, no answer left message to call office to discuss Zepbound, which is not indicated for Type 2 DM, it is for sleep apnea or obesity , we can use those Dx codes if patients  agrees

## 2025-06-05 NOTE — TELEPHONE ENCOUNTER
Patient pt on zepbound. insurance is  changing 7/1/25 it will no long cover medication for weight loss. pt asking for a prior auth under dm2. pt contacted [employer] to get coverage info for auth. they stated if pt has comorbidity   ex , sleep apnea , high cholestorol . needs them all listed on the request. Patient asking can an auth be place under type 2DM for zepbound now due to losing coverage because prior authorization was under weight loss?

## 2025-06-06 NOTE — TELEPHONE ENCOUNTER
Pt called back, very concerned, reporting that she has been submitting her A1C results to us for sometime now and reported that her A1C has been stable with the zepbound.  Pt is concerned because the insurance will not pay for the medication if related to wgt lost purposes. Pt asked for a prior auth to be resubmitted with all her acceptable comorbidities such as the elevated A1C prior to zepbound use and sleep apnea but not list the medication is for wgt loss.The insurance reported the more eligible comorbidities, the greater chance it will be accepted.

## 2025-06-09 RX ORDER — TIRZEPATIDE 10 MG/.5ML
10 INJECTION, SOLUTION SUBCUTANEOUS WEEKLY
Qty: 2 ML | Refills: 3 | Status: SHIPPED | OUTPATIENT
Start: 2025-06-09 | End: 2025-07-07

## 2025-06-09 NOTE — TELEPHONE ENCOUNTER
As noted on communication written 6/5/25:  Provider will need to reorder Zepbound if appropriate. No longer active on medication list. Provider will need to change diagnosis on prescription and make request to PA team to submit Prior Authorization.  Thank you.

## 2025-06-12 NOTE — TELEPHONE ENCOUNTER
PA for (Zepbound) 10 mg/0.5 mL  APPROVED     Date(s) approved 10/15/24-10/15/25      Patient advised by          [][employer] Message  []Phone call   [x]LMOM  []L/M to call office as no active Communication consent on file  []Unable to leave detailed message as VM not approved on Communication consent       Pharmacy advised by    []Fax  [x]Phone call  SPOKE WITH PHARMACY AND MEDICATION WENT THROUGH WITH COUPON CARD FOR $109.01 AND ON ORDER FOR 12/13/25  []Secure Chat    Specialty Pharmacy         Approval letter scanned into Media Yes

## 2025-06-12 NOTE — TELEPHONE ENCOUNTER
Patient calling to check on the prior authorization for her zepbound.   This relates to messages started 6/5.  Patient hasn't heard anything. Please call her.

## 2025-06-12 NOTE — TELEPHONE ENCOUNTER
Patient is aware that it has been approved and that it was submitted for sleep apnea. Patient was very adamant in knowing that it was processed under something else other than weight loss because she stated that her insurance would not cover it after July 1st. I informed patient that I spoke to Kisha and relayed the information about her copay and medication currently not being in stock. She is aware and she confirmed.

## 2025-06-13 ENCOUNTER — HOSPITAL ENCOUNTER (OUTPATIENT)
Dept: RADIOLOGY | Facility: CLINIC | Age: 60
End: 2025-06-13
Attending: STUDENT IN AN ORGANIZED HEALTH CARE EDUCATION/TRAINING PROGRAM
Payer: COMMERCIAL

## 2025-06-13 ENCOUNTER — OFFICE VISIT (OUTPATIENT)
Dept: OBGYN CLINIC | Facility: CLINIC | Age: 60
End: 2025-06-13
Payer: COMMERCIAL

## 2025-06-13 VITALS — HEIGHT: 62 IN | WEIGHT: 219.9 LBS | BODY MASS INDEX: 40.46 KG/M2

## 2025-06-13 DIAGNOSIS — M17.11 PRIMARY OSTEOARTHRITIS OF RIGHT KNEE: ICD-10-CM

## 2025-06-13 DIAGNOSIS — M25.561 ACUTE PAIN OF RIGHT KNEE: Primary | ICD-10-CM

## 2025-06-13 DIAGNOSIS — M25.561 RIGHT KNEE PAIN, UNSPECIFIED CHRONICITY: ICD-10-CM

## 2025-06-13 DIAGNOSIS — M76.891 TENDINITIS OF RIGHT QUADRICEPS TENDON: ICD-10-CM

## 2025-06-13 PROCEDURE — 99213 OFFICE O/P EST LOW 20 MIN: CPT | Performed by: STUDENT IN AN ORGANIZED HEALTH CARE EDUCATION/TRAINING PROGRAM

## 2025-06-13 PROCEDURE — 73564 X-RAY EXAM KNEE 4 OR MORE: CPT

## 2025-06-13 NOTE — PROGRESS NOTES
Name: Kamilah Renteria      : 1965      MRN: [id number]  Encounter Provider: José Miguel Copeland MD  Encounter Date: 2025   Encounter department: [facility] [place]      Assessment & Plan  Acute pain of right knee  Tendinitis of right quadriceps tendon  Primary osteoarthritis of right knee  Clinical history and exam consistent with delayed onset soreness from quadriceps tendinitis given her aggravating activity of climbing up and down stairs for exercise rather than her typical walk on flat ground.    Clinical exam and radiographic imaging reviewed with patient/parent today, with impression as per above. I have discussed with the patient the pathophysiology of this diagnosis and reviewed how the examination correlates with this diagnosis.    Imaging obtained/reviewed as per below. I will follow up official radiology interpretation.  Educated patient on various conservative treatment options including but are certainly not limited to: rest, ice, heat, compression, elevation, activity modification, anti-inflammatory medication, physical therapy.  Patient prefers to have some home exercise and stretches/strengthening for quadriceps which was supplemented today.  Counseled if she does plan to climb up and down stairs to wear compression sleeve of her knee and thigh while performing.  I did not feel at this time that a cortisone injection was warranted despite the mild degenerative changes of her knee as I do suspect pain was more so due to the activity/quadriceps tendinitis.  Patient agreeable as well to defer at this time.    Other factors:  BMI 40      Orders:    XR knee 4+ vw right injury; Future         Return if symptoms worsen or fail to improve.    History of Present Illness       Chief Complaint   Patient presents with    Right Knee - Pain       HPI:  Kamilah Renteria is a 59 y.o. female  who presents for     Visit 2025 :  Initial evaluation of right knee pain/injury:  Of note  "patient has a history of lumbar DDD, multi-level lumbar stenosis/prior hemilaminectomy surgeries; BMI 40  Reports ongoing right knee pain for the past week.  She states that she has been trying to walk more often to help work on reducing her weight.  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  She states last week after climbing stairs she noticed the next day she was having significant pain along the anterior aspect of her knee that extended up to the anterior thigh.  She describes a tight/aching sensation.  She had difficulty with range of motion movements of her knee and prolonged standing and weightbearing.  She gives another example of playing with her grandchildren and while trying to get up from kneeling to standing she would have again a straining sensation/aching along her anterior thigh and knee.    She has been treating with use of NSAIDs, icing and resting from walking at this time and overall feels that the symptoms are improving other than a sense of aching and tightness along her distal thigh but she was worried that she caused further injury of her knee from her activities.       Past Medical History[1]    Past Surgical History[2]    Medications Ordered Prior to Encounter[3]     Social History     Objective     Ht 5' 2\" (1.575 m)   Wt 99.7 kg (219 lb 14.4 oz)   BMI 40.22 kg/m²     Constitutional:  see vital signs  Gen: well-developed, normocephalic/atraumatic, well-groomed  Pulmonary/Chest: Effort normal. No respiratory distress.      José Miguel Copeland MD     Ortho Exam  Right knee Exam:  Inspection: No edema, erythema, ecchymosis, open wounds  Increased Warmth: no  Tenderness: + Mildly over the medial joint line and distal/central aspect of quadriceps and its insertion at the superior patella  ROM: 0-130  Knee flexion strength: 5/5  Knee extension strength: 5/5, mildly aggravates distal quadriceps tightness/aching  Patellar Grind: " "negative  Lachman's: negative  Anterior Drawer: negative  Varus laxity: negative  Valgus laxity: negative  Miller: negative     Gait: normal w/o antalgia    Imaging Studies (I personally reviewed images in PACS and report):  X-ray right knee 2025: No acute osseous abnormalities.  Mild tricompartmental degenerative changes.  Moderate joint space narrowing over the lateral tibiofemoral joint line.    MRI lumbar spine 2021: [facility].  There are no images to review but there is a imaging report notin. Severe multifactorial L3/4 spinal stenosis.     2. Both L5/S1 lateral recesses are moderate-severely narrowed, increased from prior.     3. High-grade left L5/S1 foraminal stenosis.     4. Less severe spinal and foraminal stenoses at multiple levels, detailed above.     5. Multiple prior right hemilaminectomies.     Procedures    -----------------------------------------------------------------  Portions of the record may have been created with voice recognition software. Occasional wrong word or \"sound a like\" substitutions may have occurred due to the inherent limitations of voice recognition software. Read the chart carefully and recognize, using context, where substitutions have occurred.          [1]   Past Medical History:  Diagnosis Date    Allergic     Anxiety     Cancer (HCC) 2016    COVID 2024    Diabetes mellitus (HCC)     Disease of thyroid gland 2016    GERD (gastroesophageal reflux disease)     High cholesterol     Hypertension     Hypothyroid     Inflammatory bowel disease     Obesity     Polymyalgia rheumatica (HCC)     Primary iridocyclitis of left eye 2023    Thyroid cancer (HCC) 2016   [2]   Past Surgical History:  Procedure Laterality Date    BACK SURGERY      BREAST EXCISIONAL BIOPSY Bilateral     benign - over 35 years    CARPAL TUNNEL RELEASE Right     ENDOMETRIAL ABLATION      HYSTERECTOMY  's    LAMINECTOMY      THYROIDECTOMY      TOTAL VAGINAL HYSTERECTOMY   "   [3]   Current Outpatient Medications on File Prior to Visit   Medication Sig Dispense Refill    atorvastatin (LIPITOR) 80 mg tablet TAKE 1 TABLET DAILY 90 tablet 3    Calcium Carb-Cholecalciferol (Calcium/Vitamin D) 600-400 MG-UNIT TABS Take 1 tablet by mouth in the morning and 1 tablet before bedtime.      diphenhydrAMINE (BENADRYL) 25 mg tablet       fexofenadine (ALLEGRA) 180 MG tablet Take 180 mg by mouth in the morning. PRN.      fluticasone (FLONASE) 50 mcg/act nasal spray       hydrOXYzine HCL (ATARAX) 10 mg tablet Take 1 tablet (10 mg total) by mouth every 6 (six) hours as needed for anxiety 30 tablet 1    levothyroxine 175 mcg tablet TAKE ONE TABLET DAILY ON EMPTY STOMACH 1 HOUR BEFORE BREAKFAST AND 4 HOURS APART FROM CALCIUM AND VITAMIN D SUPPLEMENTATION 90 tablet 1    lisinopril (ZESTRIL) 40 mg tablet TAKE 1 TABLET DAILY 90 tablet 3    Magnesium 400 MG CAPS Take 1 each by mouth in the morning.      Misc Natural Products (FIBER SUPREME PO) Take 1 Scoop by mouth in the morning.      montelukast (SINGULAIR) 10 mg tablet Take 1 tablet (10 mg total) by mouth daily at bedtime 30 tablet 5    omeprazole (PriLOSEC) 20 mg delayed release capsule TAKE 1 CAPSULE DAILY 90 capsule 3    Probiotic Product (CULTURELLE PROBIOTICS PO) Take 1 each by mouth in the morning.      sertraline (ZOLOFT) 100 mg tablet TAKE 2 TABLETS DAILY 180 tablet 3    tirzepatide (Zepbound) 10 mg/0.5 mL auto-injector Inject 0.5 mL (10 mg total) under the skin once a week for 28 days 2 mL 3    triamcinolone (KENALOG) 0.1 % ointment Apply topically 2 (two) times a day 15 g 0     No current facility-administered medications on file prior to visit.

## 2025-07-23 ENCOUNTER — OFFICE VISIT (OUTPATIENT)
Dept: ENDOCRINOLOGY | Facility: CLINIC | Age: 60
End: 2025-07-23
Payer: COMMERCIAL

## 2025-07-23 ENCOUNTER — HOSPITAL ENCOUNTER (OUTPATIENT)
Dept: CT IMAGING | Facility: HOSPITAL | Age: 60
Discharge: HOME | End: 2025-07-23
Attending: NURSE PRACTITIONER
Payer: COMMERCIAL

## 2025-07-23 VITALS
SYSTOLIC BLOOD PRESSURE: 130 MMHG | WEIGHT: 214.8 LBS | HEART RATE: 79 BPM | BODY MASS INDEX: 39.53 KG/M2 | DIASTOLIC BLOOD PRESSURE: 68 MMHG | OXYGEN SATURATION: 97 % | HEIGHT: 62 IN | TEMPERATURE: 97.6 F

## 2025-07-23 DIAGNOSIS — E66.813 CLASS 3 SEVERE OBESITY DUE TO EXCESS CALORIES WITH SERIOUS COMORBIDITY AND BODY MASS INDEX (BMI) OF 40.0 TO 44.9 IN ADULT: ICD-10-CM

## 2025-07-23 DIAGNOSIS — C73 THYROID CANCER (HCC): ICD-10-CM

## 2025-07-23 DIAGNOSIS — E11.9 TYPE 2 DIABETES MELLITUS WITHOUT COMPLICATION, WITHOUT LONG-TERM CURRENT USE OF INSULIN (HCC): Primary | ICD-10-CM

## 2025-07-23 DIAGNOSIS — E89.0 POSTOPERATIVE HYPOTHYROIDISM: ICD-10-CM

## 2025-07-23 DIAGNOSIS — F17.211 CIGARETTE NICOTINE DEPENDENCE IN REMISSION: ICD-10-CM

## 2025-07-23 PROCEDURE — 71271 CT THORAX LUNG CANCER SCR C-: CPT

## 2025-07-23 PROCEDURE — 99214 OFFICE O/P EST MOD 30 MIN: CPT | Performed by: STUDENT IN AN ORGANIZED HEALTH CARE EDUCATION/TRAINING PROGRAM

## 2025-07-23 RX ORDER — TIRZEPATIDE 10 MG/.5ML
10 INJECTION, SOLUTION SUBCUTANEOUS WEEKLY
COMMUNITY
End: 2025-07-23 | Stop reason: ALTCHOICE

## 2025-07-23 RX ORDER — TIRZEPATIDE 12.5 MG/.5ML
INJECTION, SOLUTION SUBCUTANEOUS
Qty: 4 ML | Refills: 3 | Status: SHIPPED | OUTPATIENT
Start: 2025-07-23

## 2025-07-23 NOTE — PROGRESS NOTES
Name: Kamilah Renteria      : 1965      MRN: [id number]  Encounter Provider: Kaushal Valencia DO  Encounter Date: 2025   Encounter department: [facility] [place]    No chief complaint on file.  :  Assessment & Plan  Type 2 diabetes mellitus without complication, without long-term current use of insulin (HCC)  Type 2 Diabetes: Well-controlled.  Her diabetes has been well-controlled with an A1c of 5.2. Historical A1c 6.5% is mentioned in records below. She will follow up in October for blood work. She will continue with Mounjaro (tirzepatide) therapy, starting with 12.5 mg weekly for 2 months, with plans to increase to 15 mg weekly for maintenance. Her treatment has been effective. The prescription will be sent to the pharmacy.    Follow-up: She will follow up in October for blood work.    Lab Results   Component Value Date    HGBA1C 5.2 2025       Orders:  •  Tirzepatide (Mounjaro) 12.5 MG/0.5ML SOAJ; E11.65 inject 12.5 mg weekly    Class 3 severe obesity due to excess calories with serious comorbidity and body mass index (BMI) of 40.0 to 44.9 in adult  She has lost approximately 20 pounds and reports feeling great with increased energy. She will continue with Mounjaro (tirzepatide) therapy, starting with 12.5 mg weekly for 2 months, with plans to increase to 15 mg weekly for maintenance.         Postoperative hypothyroidism  She is currently on 175 mcg of thyroid medication daily. Thyroid function tests will be obtained before her next visit to ensure biochemical euthyroidism.         Thyroid cancer (HCC)  Post-treatment thyroglobulin levels remain undetectable in blood. She had thyroid cancer treatment in 2016, including thyroidectomy and two surgeries, without radioactive iodine. Annual surveillance is recommended, with the last testing obtained in 2025.             Pertinent Medical History         History of Present Illness   History of Present  "Illness  The patient is a 59-year-old female who presents today for a routine follow-up.    She has been diagnosed with type 2 diabetes and was previously on Zepbound, which is no longer covered by her insurance. However, she has confirmed that Mounjaro will be covered. Her A1c level was recorded at 5.2. She is scheduled for a follow-up in 10/2025, which will include blood work. She has been using injection pens for administration and has not had to use the vial. She has been on a 10 mg dose of tirzepatide, which she tolerates well and has shown positive results. She was also on Farxiga, but it was discontinued due to concerns about her A1c levels being too low.    She has lost approximately 20 pounds and reports feeling great. 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 She reports increased energy levels and overall improved well-being. She has enough Zepbound to last her for the next two weeks.    She is aware of the symptoms of excessive thyroid medication and is mindful of them. She is currently on a daily dose of 175 mcg of thyroid medication. She is scheduled for a TSH test in 10/2025 and has a follow-up appointment with Dr. Nava in 6 months. She underwent two surgeries for thyroid cancer in 2016, which included a thyroidectomy, but did not receive radioactive iodine treatment.    She has irritable bowel syndrome (IBS) and experiences cramping when her medication dosage changes, but she has medication to manage this.    PAST SURGICAL HISTORY:  The patient underwent a thyroidectomy in 2016 and had two surgeries for thyroid cancer in the same year.    Review of Systems as per [facility] [place]         Medical History Reviewed by provider this encounter:  Tobacco  Allergies  Meds  Problems  Med Hx  Surg Hx  Fam Hx     .    Objective   /68 (BP Location: Left arm, Patient Position: Sitting)   Pulse 79   Temp 97.6 °F (36.4 °C)   Ht 5' 2\" (1.575 m)   Wt 97.4 kg (214 lb 12.8 oz)   " SpO2 97%   BMI 39.29 kg/m²      Body mass index is 39.29 kg/m².  Wt Readings from Last 3 Encounters:   07/23/25 97.4 kg (214 lb 12.8 oz)   06/13/25 99.7 kg (219 lb 14.4 oz)   04/17/25 99.6 kg (219 lb 9.6 oz)   Physical Exam  Vitals reviewed.   Constitutional:       General: She is not in acute distress.     Appearance: Normal appearance.   HENT:      Head: Normocephalic and atraumatic.     Eyes:      General: No scleral icterus.     Conjunctiva/sclera: Conjunctivae normal.       Cardiovascular:      Pulses: no weak pulses.           Dorsalis pedis pulses are 2+ on the right side and 2+ on the left side.   Pulmonary:      Effort: Pulmonary effort is normal. No respiratory distress.     Musculoskeletal:         General: No deformity.      Cervical back: Normal range of motion.   Feet:      Right foot:      Skin integrity: No ulcer, skin breakdown, erythema, warmth, callus or dry skin.      Left foot:      Skin integrity: No ulcer, skin breakdown, erythema, warmth, callus or dry skin.     Neurological:      General: No focal deficit present.      Mental Status: She is alert.     Psychiatric:         Mood and Affect: Mood normal.         Behavior: Behavior normal.   Patient's shoes and socks removed.    Right Foot/Ankle   Right Foot Inspection  Skin Exam: skin normal and skin intact. No dry skin, no warmth, no callus, no erythema, no maceration, no abnormal color, no pre-ulcer, no ulcer and no callus.     Toe Exam: ROM and strength within normal limits.     Sensory   Vibration: intact  Monofilament testing: intact    Vascular  The right DP pulse is 2+.     Left Foot/Ankle  Left Foot Inspection  Skin Exam: skin normal and skin intact. No dry skin, no warmth, no erythema, no maceration, normal color, no pre-ulcer, no ulcer and no callus.     Toe Exam: ROM and strength within normal limits.     Sensory   Vibration: intact  Monofilament testing: intact    Vascular  The left DP pulse is 2+.     Assign Risk Category  No  deformity present  No loss of protective sensation  No weak pulses  Risk: 0    Last Eye Exam: 02/23/2023  Last Foot Exam: 06/07/2024  Health Maintenance   Topic Date Due   • Diabetic Eye Exam  02/23/2025   • Diabetic Foot Exam  07/23/2026       Labs: I have reviewed pertinent labs including:     Component  Ref Range & Units 10 yr ago   HA1C (Glycohemoglobin)  4.9 - 6.0 % 6.5 High       [facility]     Specimen Collected: 12/04/14  6:41 AM       There are no Patient Instructions on file for this visit.    Discussed with the patient and all questioned fully answered. She will call me if any problems arise.

## 2025-07-23 NOTE — ASSESSMENT & PLAN NOTE
Type 2 Diabetes: Well-controlled.  Her diabetes has been well-controlled with an A1c of 5.2. Historical A1c 6.5% is mentioned in records below. She will follow up in October for blood work. She will continue with Mounjaro (tirzepatide) therapy, starting with 12.5 mg weekly for 2 months, with plans to increase to 15 mg weekly for maintenance. Her treatment has been effective. The prescription will be sent to the pharmacy.    Follow-up: She will follow up in October for blood work.    Lab Results   Component Value Date    HGBA1C 5.2 04/17/2025       Orders:  •  Tirzepatide (Mounjaro) 12.5 MG/0.5ML SOAJ; E11.65 inject 12.5 mg weekly

## 2025-07-23 NOTE — ASSESSMENT & PLAN NOTE
Post-treatment thyroglobulin levels remain undetectable in blood. She had thyroid cancer treatment in 2016, including thyroidectomy and two surgeries, without radioactive iodine. Annual surveillance is recommended, with the last testing obtained in 04/2025.

## 2025-07-24 ENCOUNTER — TELEPHONE (OUTPATIENT)
Age: 60
End: 2025-07-24

## 2025-07-24 NOTE — TELEPHONE ENCOUNTER
PA for (Mounjaro) 12.5 MG SUBMITTED to [employer]    via  [x][employer]-Case ID # [id number]    [x]PA sent as URGENT    All office notes, labs and other pertaining documents and studies sent. Clinical questions answered. Awaiting determination from insurance company.     Turnaround time for your insurance to make a decision on your Prior Authorization can take 7-21 business days.

## 2025-07-25 ENCOUNTER — HOSPITAL ENCOUNTER (OUTPATIENT)
Dept: RADIOLOGY | Facility: CLINIC | Age: 60
End: 2025-07-25
Payer: COMMERCIAL

## 2025-07-25 VITALS — HEIGHT: 63 IN | WEIGHT: 214 LBS | BODY MASS INDEX: 37.92 KG/M2

## 2025-07-25 DIAGNOSIS — Z12.31 ENCOUNTER FOR SCREENING MAMMOGRAM FOR BREAST CANCER: ICD-10-CM

## 2025-07-25 PROCEDURE — 77063 BREAST TOMOSYNTHESIS BI: CPT

## 2025-07-25 PROCEDURE — 77067 SCR MAMMO BI INCL CAD: CPT

## 2025-07-25 NOTE — TELEPHONE ENCOUNTER
PA for (Mounjaro) 12.5 MG  APPROVED     Date(s) approved 7/24/25-7/24/26    Case #    Patient advised by          [x]MyChart Message  []Phone call   []LMOM  []L/M to call office as no active Communication consent on file  []Unable to leave detailed message as VM not approved on Communication consent       Pharmacy advised by    [x]Fax  []Phone call  []Secure Chat    Specialty Pharmacy    []      Approval letter scanned into Media No

## 2025-08-01 ENCOUNTER — APPOINTMENT (OUTPATIENT)
Dept: LAB | Facility: CLINIC | Age: 60
End: 2025-08-01
Payer: COMMERCIAL

## 2025-08-01 DIAGNOSIS — E89.0 POSTOPERATIVE HYPOTHYROIDISM: Primary | ICD-10-CM

## 2025-08-01 LAB
T4 FREE SERPL-MCNC: 1.02 NG/DL (ref 0.61–1.12)
TSH SERPL DL<=0.05 MIU/L-ACNC: 0.07 UIU/ML (ref 0.45–4.5)

## 2025-08-01 PROCEDURE — 84439 ASSAY OF FREE THYROXINE: CPT

## 2025-08-01 PROCEDURE — 36415 COLL VENOUS BLD VENIPUNCTURE: CPT

## 2025-08-01 PROCEDURE — 84443 ASSAY THYROID STIM HORMONE: CPT

## 2025-08-04 DIAGNOSIS — E11.9 TYPE 2 DIABETES MELLITUS WITHOUT COMPLICATION, WITHOUT LONG-TERM CURRENT USE OF INSULIN (HCC): Primary | ICD-10-CM

## 2025-08-04 RX ORDER — LEVOTHYROXINE SODIUM 175 UG/1
TABLET ORAL
Qty: 90 TABLET | Refills: 1 | Status: SHIPPED | OUTPATIENT
Start: 2025-08-04

## 2025-08-18 ENCOUNTER — OFFICE VISIT (OUTPATIENT)
Dept: FAMILY MEDICINE CLINIC | Facility: CLINIC | Age: 60
End: 2025-08-18

## 2025-08-18 VITALS
OXYGEN SATURATION: 98 % | DIASTOLIC BLOOD PRESSURE: 78 MMHG | BODY MASS INDEX: 38.8 KG/M2 | WEIGHT: 219 LBS | HEIGHT: 63 IN | SYSTOLIC BLOOD PRESSURE: 128 MMHG | HEART RATE: 68 BPM

## 2025-08-18 DIAGNOSIS — L85.3 DRY SKIN: ICD-10-CM

## 2025-08-18 DIAGNOSIS — M67.422: Primary | ICD-10-CM

## 2025-08-18 PROCEDURE — 99213 OFFICE O/P EST LOW 20 MIN: CPT | Performed by: NURSE PRACTITIONER

## 2025-08-18 RX ORDER — DICYCLOMINE HYDROCHLORIDE 10 MG/1
10 CAPSULE ORAL
COMMUNITY
Start: 2025-08-16